# Patient Record
Sex: MALE | Race: WHITE | NOT HISPANIC OR LATINO | Employment: FULL TIME | URBAN - METROPOLITAN AREA
[De-identification: names, ages, dates, MRNs, and addresses within clinical notes are randomized per-mention and may not be internally consistent; named-entity substitution may affect disease eponyms.]

---

## 2019-01-11 ENCOUNTER — HOSPITAL ENCOUNTER (OUTPATIENT)
Dept: OTHER | Facility: HOSPITAL | Age: 34
Discharge: HOME | End: 2019-01-11
Attending: PSYCHIATRY & NEUROLOGY

## 2019-01-11 LAB
25(OH)D3 SERPL-MCNC: 28.3 NG/ML (ref 30–100)
ALBUMIN SERPL-MCNC: 4.3 G/DL (ref 3.5–5)
ALBUMIN/GLOB SERPL: 1.4 {RATIO} (ref 1.4–2.6)
ALP SERPL-CCNC: 94 U/L (ref 53–128)
ALT SERPL-CCNC: 112 U/L (ref 10–40)
ANION GAP SERPL CALC-SCNC: 21 MMOL/L (ref 8–19)
AST SERPL-CCNC: 62 U/L (ref 15–50)
BASOPHILS # BLD AUTO: 0.05 10*3/UL (ref 0–0.2)
BASOPHILS NFR BLD AUTO: 0.77 % (ref 0–3)
BILIRUB SERPL-MCNC: 0.55 MG/DL (ref 0.2–1.3)
BUN SERPL-MCNC: 16 MG/DL (ref 5–25)
BUN/CREAT SERPL: 11 {RATIO} (ref 6–20)
CALCIUM SERPL-MCNC: 9.9 MG/DL (ref 8.7–10.4)
CEA SERPL-MCNC: 1.2 NG/ML (ref 0–5)
CHLORIDE SERPL-SCNC: 104 MMOL/L (ref 99–111)
CONV CO2: 22 MMOL/L (ref 22–32)
CONV TOTAL PROTEIN: 7.3 G/DL (ref 6.3–8.2)
CREAT UR-MCNC: 1.49 MG/DL (ref 0.7–1.2)
EOSINOPHIL # BLD AUTO: 0.12 10*3/UL (ref 0–0.7)
EOSINOPHIL # BLD AUTO: 2.03 % (ref 0–7)
ERYTHROCYTE [DISTWIDTH] IN BLOOD BY AUTOMATED COUNT: 12.3 % (ref 11.5–14.5)
EST. AVERAGE GLUCOSE BLD GHB EST-MCNC: 100 MG/DL
GFR SERPLBLD BASED ON 1.73 SQ M-ARVRAT: 60 ML/MIN/{1.73_M2}
GLOBULIN UR ELPH-MCNC: 3 G/DL (ref 2–3.5)
GLUCOSE 2H P MEAL SERPL-MCNC: 130 MG/DL (ref 70–140)
GLUCOSE SERPL-MCNC: 140 MG/DL (ref 70–99)
HBA1C MFR BLD: 16.6 G/DL (ref 14–18)
HBA1C MFR BLD: 5.1 % (ref 3.5–5.7)
HCT VFR BLD AUTO: 44.9 % (ref 42–52)
LYMPHOCYTES # BLD AUTO: 1.32 10*3/UL (ref 1–5)
MCH RBC QN AUTO: 32.4 PG (ref 27–31)
MCHC RBC AUTO-ENTMCNC: 37 G/DL (ref 33–37)
MCV RBC AUTO: 87.5 FL (ref 80–96)
MONOCYTES # BLD AUTO: 0.54 10*3/UL (ref 0.2–1.2)
MONOCYTES NFR BLD AUTO: 8.91 % (ref 3–10)
NEUTROPHILS # BLD AUTO: 4 10*3/UL (ref 2–8)
NEUTROPHILS NFR BLD AUTO: 66.4 % (ref 30–85)
NRBC BLD AUTO-RTO: 0 % (ref 0–0.01)
OSMOLALITY SERPL CALC.SUM OF ELEC: 297 MOSM/KG (ref 273–304)
PLATELET # BLD AUTO: 168 10*3/UL (ref 130–400)
PMV BLD AUTO: 7.2 FL (ref 7.4–10.4)
POTASSIUM SERPL-SCNC: 4.8 MMOL/L (ref 3.5–5.3)
RBC # BLD AUTO: 5.12 10*6/UL (ref 4.7–6.1)
SODIUM SERPL-SCNC: 142 MMOL/L (ref 135–147)
T4 FREE SERPL-MCNC: 1.1 NG/DL (ref 0.9–1.8)
TSH SERPL-ACNC: 2.3 M[IU]/L (ref 0.27–4.2)
VARIANT LYMPHS NFR BLD MANUAL: 21.9 % (ref 20–45)
WBC # BLD AUTO: 6.02 10*3/UL (ref 4.8–10.8)

## 2019-01-14 LAB
B BURGDOR IGG+IGM SER-ACNC: NORMAL
CONV ANTI NUCLEAR ANTIBODY WITH REFLEX: NEGATIVE

## 2019-01-15 LAB
ARSENIC BLD-MCNC: 7 UG/L (ref 2–23)
LEAD BLD-MCNC: NORMAL UG/DL (ref 0–4)
MERCURY BLD-MCNC: NORMAL UG/L (ref 0–14.9)

## 2019-01-25 ENCOUNTER — HOSPITAL ENCOUNTER (OUTPATIENT)
Dept: GENERAL RADIOLOGY | Facility: HOSPITAL | Age: 34
Discharge: HOME | End: 2019-01-25
Attending: FAMILY MEDICINE

## 2019-04-11 ENCOUNTER — HOSPITAL ENCOUNTER (OUTPATIENT)
Dept: INFUSION THERAPY | Facility: HOSPITAL | Age: 34
Setting detail: RECURRING SERIES
Discharge: HOME | End: 2019-04-30
Attending: PSYCHIATRY & NEUROLOGY

## 2019-05-01 ENCOUNTER — HOSPITAL ENCOUNTER (OUTPATIENT)
Dept: INFUSION THERAPY | Facility: HOSPITAL | Age: 34
Setting detail: RECURRING SERIES
Discharge: HOME | End: 2019-05-31
Attending: PSYCHIATRY & NEUROLOGY

## 2019-06-01 ENCOUNTER — HOSPITAL ENCOUNTER (OUTPATIENT)
Dept: INFUSION THERAPY | Facility: HOSPITAL | Age: 34
Setting detail: RECURRING SERIES
Discharge: HOME | End: 2019-06-30
Attending: PSYCHIATRY & NEUROLOGY

## 2019-07-01 ENCOUNTER — HOSPITAL ENCOUNTER (OUTPATIENT)
Dept: INFUSION THERAPY | Facility: HOSPITAL | Age: 34
Setting detail: RECURRING SERIES
Discharge: HOME | End: 2019-07-31
Attending: PSYCHIATRY & NEUROLOGY

## 2020-02-01 ENCOUNTER — HOSPITAL ENCOUNTER (OUTPATIENT)
Dept: INFUSION THERAPY | Facility: HOSPITAL | Age: 35
Setting detail: RECURRING SERIES
Discharge: HOME | End: 2020-02-18
Attending: PSYCHIATRY & NEUROLOGY

## 2020-02-17 LAB
ALBUMIN SERPL-MCNC: 4.3 G/DL (ref 3.5–5)
ALBUMIN/GLOB SERPL: 1.7 {RATIO} (ref 1.4–2.6)
ALP SERPL-CCNC: 70 U/L (ref 53–128)
ALT SERPL-CCNC: 124 U/L (ref 10–40)
ANION GAP SERPL CALC-SCNC: 16 MMOL/L (ref 8–19)
AST SERPL-CCNC: 59 U/L (ref 15–50)
BASOPHILS # BLD AUTO: 0.06 10*3/UL (ref 0–0.2)
BASOPHILS NFR BLD AUTO: 1.4 % (ref 0–3)
BILIRUB SERPL-MCNC: 0.76 MG/DL (ref 0.2–1.3)
BUN SERPL-MCNC: 10 MG/DL (ref 5–25)
BUN/CREAT SERPL: 10 {RATIO} (ref 6–20)
CALCIUM SERPL-MCNC: 9.5 MG/DL (ref 8.7–10.4)
CHLORIDE SERPL-SCNC: 101 MMOL/L (ref 99–111)
CONV ABS IMM GRAN: 0.01 10*3/UL (ref 0–0.2)
CONV CO2: 26 MMOL/L (ref 22–32)
CONV IMMATURE GRAN: 0.2 % (ref 0–1.8)
CONV TOTAL PROTEIN: 6.9 G/DL (ref 6.3–8.2)
CREAT UR-MCNC: 1.01 MG/DL (ref 0.7–1.2)
DEPRECATED RDW RBC AUTO: 43.4 FL (ref 35.1–43.9)
EOSINOPHIL # BLD AUTO: 0.12 10*3/UL (ref 0–0.7)
EOSINOPHIL # BLD AUTO: 2.8 % (ref 0–7)
ERYTHROCYTE [DISTWIDTH] IN BLOOD BY AUTOMATED COUNT: 13.2 % (ref 11.6–14.4)
GFR SERPLBLD BASED ON 1.73 SQ M-ARVRAT: >60 ML/MIN/{1.73_M2}
GLOBULIN UR ELPH-MCNC: 2.6 G/DL (ref 2–3.5)
GLUCOSE SERPL-MCNC: 96 MG/DL (ref 70–99)
HCT VFR BLD AUTO: 47.3 % (ref 42–52)
HGB BLD-MCNC: 16.3 G/DL (ref 14–18)
LYMPHOCYTES # BLD AUTO: 0.91 10*3/UL (ref 1–5)
LYMPHOCYTES NFR BLD AUTO: 20.9 % (ref 20–45)
MCH RBC QN AUTO: 31.3 PG (ref 27–31)
MCHC RBC AUTO-ENTMCNC: 34.5 G/DL (ref 33–37)
MCV RBC AUTO: 91 FL (ref 80–96)
MONOCYTES # BLD AUTO: 0.46 10*3/UL (ref 0.2–1.2)
MONOCYTES NFR BLD AUTO: 10.6 % (ref 3–10)
NEUTROPHILS # BLD AUTO: 2.8 10*3/UL (ref 2–8)
NEUTROPHILS NFR BLD AUTO: 64.1 % (ref 30–85)
NRBC CBCN: 0 % (ref 0–0.7)
OSMOLALITY SERPL CALC.SUM OF ELEC: 287 MOSM/KG (ref 273–304)
PLATELET # BLD AUTO: 152 10*3/UL (ref 130–400)
PMV BLD AUTO: 10 FL (ref 9.4–12.4)
POTASSIUM SERPL-SCNC: 4.4 MMOL/L (ref 3.5–5.3)
RBC # BLD AUTO: 5.2 10*6/UL (ref 4.7–6.1)
SODIUM SERPL-SCNC: 139 MMOL/L (ref 135–147)
WBC # BLD AUTO: 4.36 10*3/UL (ref 4.8–10.8)

## 2020-02-18 LAB — B BURGDOR IGG+IGM SER-ACNC: <0.91 ISR (ref 0–0.9)

## 2020-02-19 LAB
R RICKETTSI IGG SER QL IA: NEGATIVE
R RICKETTSI IGM TITR SER: 0.58 INDEX (ref 0–0.89)

## 2021-08-24 ENCOUNTER — HOSPITAL ENCOUNTER (EMERGENCY)
Facility: HOSPITAL | Age: 36
Discharge: HOME | End: 2021-08-25
Attending: EMERGENCY MEDICINE | Admitting: EMERGENCY MEDICINE

## 2021-08-24 VITALS
OXYGEN SATURATION: 92 % | HEART RATE: 104 BPM | RESPIRATION RATE: 18 BRPM | HEIGHT: 68 IN | WEIGHT: 266.54 LBS | TEMPERATURE: 98.4 F | DIASTOLIC BLOOD PRESSURE: 77 MMHG | SYSTOLIC BLOOD PRESSURE: 117 MMHG | BODY MASS INDEX: 40.4 KG/M2

## 2021-08-24 DIAGNOSIS — T30.0 HOT LIQUID BURN: Primary | ICD-10-CM
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
## 2021-08-24 PROCEDURE — 99283 EMERGENCY DEPT VISIT LOW MDM: CPT

## 2021-08-24 RX ORDER — LIDOCAINE AND PRILOCAINE 25; 25 MG/G; MG/G
CREAM TOPICAL ONCE
Status: COMPLETED | OUTPATIENT
Start: 2021-08-25 | End: 2021-08-25

## 2021-08-24 RX ORDER — LIDOCAINE AND PRILOCAINE 25; 25 MG/G; MG/G
CREAM TOPICAL ONCE
Qty: 1 KIT | Refills: 0 | Status: SHIPPED | OUTPATIENT
Start: 2021-08-25 | End: 2021-08-25

## 2021-08-25 RX ADMIN — LIDOCAINE AND PRILOCAINE 1 APPLICATION: 25; 25 CREAM TOPICAL at 00:00
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
## 2021-08-25 NOTE — DISCHARGE INSTRUCTIONS
As discussed, please call your eye doctor tomorrow to schedule an appointment for an exam as soon as possible to verify that there is no damage to your left eye.

## 2022-03-05 ENCOUNTER — HOSPITAL ENCOUNTER (EMERGENCY)
Facility: HOSPITAL | Age: 37
Discharge: LEFT BEFORE DISCHARGE | End: 2022-03-05

## 2022-03-05 PROCEDURE — 99211 OFF/OP EST MAY X REQ PHY/QHP: CPT

## 2022-06-29 ENCOUNTER — HOSPITAL ENCOUNTER (EMERGENCY)
Facility: HOSPITAL | Age: 37
Discharge: HOME | End: 2022-06-29
Attending: EMERGENCY MEDICINE | Admitting: EMERGENCY MEDICINE

## 2022-06-29 VITALS
BODY MASS INDEX: 40.93 KG/M2 | SYSTOLIC BLOOD PRESSURE: 134 MMHG | HEIGHT: 68 IN | RESPIRATION RATE: 18 BRPM | TEMPERATURE: 98.1 F | OXYGEN SATURATION: 100 % | WEIGHT: 270.06 LBS | DIASTOLIC BLOOD PRESSURE: 88 MMHG | HEART RATE: 92 BPM

## 2022-06-29 DIAGNOSIS — K43.9 VENTRAL HERNIA WITHOUT OBSTRUCTION OR GANGRENE: Primary | ICD-10-CM

## 2022-06-29 LAB
ALBUMIN SERPL-MCNC: 4.3 G/DL (ref 3.5–5.2)
ALBUMIN/GLOB SERPL: 1.6 G/DL
ALP SERPL-CCNC: 75 U/L (ref 39–117)
ALT SERPL W P-5'-P-CCNC: 122 U/L (ref 1–41)
ANION GAP SERPL CALCULATED.3IONS-SCNC: 7.6 MMOL/L (ref 5–15)
AST SERPL-CCNC: 84 U/L (ref 1–40)
BASOPHILS # BLD AUTO: 0.07 10*3/MM3 (ref 0–0.2)
BASOPHILS NFR BLD AUTO: 1.3 % (ref 0–1.5)
BILIRUB SERPL-MCNC: 1.2 MG/DL (ref 0–1.2)
BUN SERPL-MCNC: 7 MG/DL (ref 6–20)
BUN/CREAT SERPL: 6.9 (ref 7–25)
CALCIUM SPEC-SCNC: 9.8 MG/DL (ref 8.6–10.5)
CHLORIDE SERPL-SCNC: 103 MMOL/L (ref 98–107)
CO2 SERPL-SCNC: 27.4 MMOL/L (ref 22–29)
CREAT SERPL-MCNC: 1.01 MG/DL (ref 0.76–1.27)
DEPRECATED RDW RBC AUTO: 44.4 FL (ref 37–54)
EGFRCR SERPLBLD CKD-EPI 2021: 97.6 ML/MIN/1.73
EOSINOPHIL # BLD AUTO: 0.2 10*3/MM3 (ref 0–0.4)
EOSINOPHIL NFR BLD AUTO: 3.8 % (ref 0.3–6.2)
ERYTHROCYTE [DISTWIDTH] IN BLOOD BY AUTOMATED COUNT: 13.2 % (ref 12.3–15.4)
GLOBULIN UR ELPH-MCNC: 2.7 GM/DL
GLUCOSE SERPL-MCNC: 125 MG/DL (ref 65–99)
HCT VFR BLD AUTO: 50.9 % (ref 37.5–51)
HGB BLD-MCNC: 17.8 G/DL (ref 13–17.7)
HOLD SPECIMEN: NORMAL
HOLD SPECIMEN: NORMAL
IMM GRANULOCYTES # BLD AUTO: 0.02 10*3/MM3 (ref 0–0.05)
IMM GRANULOCYTES NFR BLD AUTO: 0.4 % (ref 0–0.5)
LIPASE SERPL-CCNC: 54 U/L (ref 13–60)
LYMPHOCYTES # BLD AUTO: 1.2 10*3/MM3 (ref 0.7–3.1)
LYMPHOCYTES NFR BLD AUTO: 22.6 % (ref 19.6–45.3)
MCH RBC QN AUTO: 32.1 PG (ref 26.6–33)
MCHC RBC AUTO-ENTMCNC: 35 G/DL (ref 31.5–35.7)
MCV RBC AUTO: 91.7 FL (ref 79–97)
MONOCYTES # BLD AUTO: 0.47 10*3/MM3 (ref 0.1–0.9)
MONOCYTES NFR BLD AUTO: 8.9 % (ref 5–12)
NEUTROPHILS NFR BLD AUTO: 3.35 10*3/MM3 (ref 1.7–7)
NEUTROPHILS NFR BLD AUTO: 63 % (ref 42.7–76)
NRBC BLD AUTO-RTO: 0 /100 WBC (ref 0–0.2)
PLATELET # BLD AUTO: 146 10*3/MM3 (ref 140–450)
PMV BLD AUTO: 9.2 FL (ref 6–12)
POTASSIUM SERPL-SCNC: 4.5 MMOL/L (ref 3.5–5.2)
PROT SERPL-MCNC: 7 G/DL (ref 6–8.5)
RBC # BLD AUTO: 5.55 10*6/MM3 (ref 4.14–5.8)
SODIUM SERPL-SCNC: 138 MMOL/L (ref 136–145)
WBC NRBC COR # BLD: 5.31 10*3/MM3 (ref 3.4–10.8)
WHOLE BLOOD HOLD COAG: NORMAL
WHOLE BLOOD HOLD SPECIMEN: NORMAL

## 2022-06-29 PROCEDURE — 99282 EMERGENCY DEPT VISIT SF MDM: CPT

## 2022-06-29 PROCEDURE — 83690 ASSAY OF LIPASE: CPT | Performed by: EMERGENCY MEDICINE

## 2022-06-29 PROCEDURE — 36415 COLL VENOUS BLD VENIPUNCTURE: CPT

## 2022-06-29 PROCEDURE — 85025 COMPLETE CBC W/AUTO DIFF WBC: CPT | Performed by: EMERGENCY MEDICINE

## 2022-06-29 PROCEDURE — 80053 COMPREHEN METABOLIC PANEL: CPT | Performed by: EMERGENCY MEDICINE

## 2022-06-29 RX ORDER — SODIUM CHLORIDE 0.9 % (FLUSH) 0.9 %
10 SYRINGE (ML) INJECTION AS NEEDED
Status: DISCONTINUED | OUTPATIENT
Start: 2022-06-29 | End: 2022-06-29 | Stop reason: HOSPADM

## 2022-07-07 ENCOUNTER — TELEPHONE (OUTPATIENT)
Dept: SURGERY | Facility: CLINIC | Age: 37
End: 2022-07-07

## 2022-07-11 NOTE — TELEPHONE ENCOUNTER
3RD ATTEMPT, CALLED PT NO ANSWER LMOM, PT REFERRED FROM [facility] ER, ANYTHING ELSE TO DO?

## 2022-08-17 ENCOUNTER — HOSPITAL ENCOUNTER (EMERGENCY)
Facility: HOSPITAL | Age: 37
Discharge: LEFT BEFORE DISCHARGE | End: 2022-08-17

## 2022-08-17 VITALS
HEIGHT: 68 IN | HEART RATE: 98 BPM | TEMPERATURE: 97.8 F | DIASTOLIC BLOOD PRESSURE: 85 MMHG | RESPIRATION RATE: 18 BRPM | BODY MASS INDEX: 40.46 KG/M2 | SYSTOLIC BLOOD PRESSURE: 135 MMHG | OXYGEN SATURATION: 99 % | WEIGHT: 266.98 LBS

## 2022-08-17 PROCEDURE — 99211 OFF/OP EST MAY X REQ PHY/QHP: CPT

## 2022-08-20 ENCOUNTER — HOSPITAL ENCOUNTER (EMERGENCY)
Facility: HOSPITAL | Age: 37
Discharge: LEFT BEFORE DISCHARGE | End: 2022-08-20

## 2022-08-20 PROCEDURE — 99211 OFF/OP EST MAY X REQ PHY/QHP: CPT

## 2023-01-05 ENCOUNTER — HOSPITAL ENCOUNTER (EMERGENCY)
Facility: HOSPITAL | Age: 38
Discharge: HOME | End: 2023-01-05
Attending: EMERGENCY MEDICINE | Admitting: EMERGENCY MEDICINE
Payer: COMMERCIAL

## 2023-01-05 ENCOUNTER — APPOINTMENT (OUTPATIENT)
Dept: GENERAL RADIOLOGY | Facility: HOSPITAL | Age: 38
End: 2023-01-05
Payer: COMMERCIAL

## 2023-01-05 ENCOUNTER — TELEPHONE (OUTPATIENT)
Dept: FAMILY MEDICINE CLINIC | Facility: CLINIC | Age: 38
End: 2023-01-05

## 2023-01-05 VITALS
DIASTOLIC BLOOD PRESSURE: 87 MMHG | SYSTOLIC BLOOD PRESSURE: 130 MMHG | RESPIRATION RATE: 18 BRPM | HEART RATE: 92 BPM | TEMPERATURE: 98.5 F | OXYGEN SATURATION: 99 % | BODY MASS INDEX: 41.1 KG/M2 | WEIGHT: 271.17 LBS | HEIGHT: 68 IN

## 2023-01-05 DIAGNOSIS — R55 SYNCOPE, UNSPECIFIED SYNCOPE TYPE: Primary | ICD-10-CM

## 2023-01-05 LAB
ALBUMIN SERPL-MCNC: 4.2 G/DL (ref 3.5–5.2)
ALBUMIN/GLOB SERPL: 1.4 G/DL
ALP SERPL-CCNC: 74 U/L (ref 39–117)
ALT SERPL W P-5'-P-CCNC: 83 U/L (ref 1–41)
ANION GAP SERPL CALCULATED.3IONS-SCNC: 11.9 MMOL/L (ref 5–15)
AST SERPL-CCNC: 69 U/L (ref 1–40)
BASOPHILS # BLD AUTO: 0.1 10*3/MM3 (ref 0–0.2)
BASOPHILS NFR BLD AUTO: 1.4 % (ref 0–1.5)
BILIRUB SERPL-MCNC: 0.7 MG/DL (ref 0–1.2)
BUN SERPL-MCNC: 6 MG/DL (ref 6–20)
BUN/CREAT SERPL: 6.7 (ref 7–25)
CALCIUM SPEC-SCNC: 9.3 MG/DL (ref 8.6–10.5)
CHLORIDE SERPL-SCNC: 102 MMOL/L (ref 98–107)
CO2 SERPL-SCNC: 27.1 MMOL/L (ref 22–29)
CREAT SERPL-MCNC: 0.9 MG/DL (ref 0.76–1.27)
DEPRECATED RDW RBC AUTO: 42.9 FL (ref 37–54)
EGFRCR SERPLBLD CKD-EPI 2021: 111.4 ML/MIN/1.73
EOSINOPHIL # BLD AUTO: 0.3 10*3/MM3 (ref 0–0.4)
EOSINOPHIL NFR BLD AUTO: 4.1 % (ref 0.3–6.2)
ERYTHROCYTE [DISTWIDTH] IN BLOOD BY AUTOMATED COUNT: 13.2 % (ref 12.3–15.4)
ETHANOL BLD-MCNC: 194 MG/DL (ref 0–10)
ETHANOL UR QL: 0.19 %
GLOBULIN UR ELPH-MCNC: 3.1 GM/DL
GLUCOSE SERPL-MCNC: 87 MG/DL (ref 65–99)
HCT VFR BLD AUTO: 47.5 % (ref 37.5–51)
HGB BLD-MCNC: 17 G/DL (ref 13–17.7)
HOLD SPECIMEN: NORMAL
HOLD SPECIMEN: NORMAL
IMM GRANULOCYTES # BLD AUTO: 0.03 10*3/MM3 (ref 0–0.05)
IMM GRANULOCYTES NFR BLD AUTO: 0.4 % (ref 0–0.5)
LIPASE SERPL-CCNC: 53 U/L (ref 13–60)
LYMPHOCYTES # BLD AUTO: 2.12 10*3/MM3 (ref 0.7–3.1)
LYMPHOCYTES NFR BLD AUTO: 28.6 % (ref 19.6–45.3)
MAGNESIUM SERPL-MCNC: 2.2 MG/DL (ref 1.6–2.6)
MCH RBC QN AUTO: 32.1 PG (ref 26.6–33)
MCHC RBC AUTO-ENTMCNC: 35.8 G/DL (ref 31.5–35.7)
MCV RBC AUTO: 89.8 FL (ref 79–97)
MONOCYTES # BLD AUTO: 0.7 10*3/MM3 (ref 0.1–0.9)
MONOCYTES NFR BLD AUTO: 9.5 % (ref 5–12)
NEUTROPHILS NFR BLD AUTO: 4.15 10*3/MM3 (ref 1.7–7)
NEUTROPHILS NFR BLD AUTO: 56 % (ref 42.7–76)
NRBC BLD AUTO-RTO: 0 /100 WBC (ref 0–0.2)
NT-PROBNP SERPL-MCNC: <36 PG/ML (ref 0–450)
PLATELET # BLD AUTO: 142 10*3/MM3 (ref 140–450)
PMV BLD AUTO: 9.1 FL (ref 6–12)
POTASSIUM SERPL-SCNC: 4 MMOL/L (ref 3.5–5.2)
PROT SERPL-MCNC: 7.3 G/DL (ref 6–8.5)
RBC # BLD AUTO: 5.29 10*6/MM3 (ref 4.14–5.8)
SODIUM SERPL-SCNC: 141 MMOL/L (ref 136–145)
TROPONIN I SERPL-MCNC: 0 NG/ML (ref 0–0.08)
TROPONIN I SERPL-MCNC: 0.01 NG/ML (ref 0–0.08)
WBC NRBC COR # BLD: 7.4 10*3/MM3 (ref 3.4–10.8)
WHOLE BLOOD HOLD COAG: NORMAL
WHOLE BLOOD HOLD SPECIMEN: NORMAL

## 2023-01-05 PROCEDURE — 36415 COLL VENOUS BLD VENIPUNCTURE: CPT

## 2023-01-05 PROCEDURE — 83735 ASSAY OF MAGNESIUM: CPT

## 2023-01-05 PROCEDURE — 93005 ELECTROCARDIOGRAM TRACING: CPT

## 2023-01-05 PROCEDURE — 84484 ASSAY OF TROPONIN QUANT: CPT

## 2023-01-05 PROCEDURE — 93005 ELECTROCARDIOGRAM TRACING: CPT | Performed by: EMERGENCY MEDICINE

## 2023-01-05 PROCEDURE — 80053 COMPREHEN METABOLIC PANEL: CPT

## 2023-01-05 PROCEDURE — 82077 ASSAY SPEC XCP UR&BREATH IA: CPT | Performed by: EMERGENCY MEDICINE

## 2023-01-05 PROCEDURE — 83880 ASSAY OF NATRIURETIC PEPTIDE: CPT

## 2023-01-05 PROCEDURE — 93010 ELECTROCARDIOGRAM REPORT: CPT | Performed by: INTERNAL MEDICINE

## 2023-01-05 PROCEDURE — 71045 X-RAY EXAM CHEST 1 VIEW: CPT

## 2023-01-05 PROCEDURE — 99284 EMERGENCY DEPT VISIT MOD MDM: CPT

## 2023-01-05 PROCEDURE — 83690 ASSAY OF LIPASE: CPT

## 2023-01-05 PROCEDURE — 85025 COMPLETE CBC W/AUTO DIFF WBC: CPT

## 2023-01-05 RX ORDER — ASPIRIN 81 MG/1
324 TABLET, CHEWABLE ORAL ONCE
Status: DISCONTINUED | OUTPATIENT
Start: 2023-01-05 | End: 2023-01-06 | Stop reason: HOSPADM

## 2023-01-05 RX ORDER — SODIUM CHLORIDE 0.9 % (FLUSH) 0.9 %
10 SYRINGE (ML) INJECTION AS NEEDED
Status: DISCONTINUED | OUTPATIENT
Start: 2023-01-05 | End: 2023-01-06 | Stop reason: HOSPADM

## 2023-01-05 NOTE — Clinical Note
[facility] EMERGENCY ROOM  [address]  [place] [address]  Phone: [phone]    Kurt Balbuena was seen and treated in our emergency department on 1/5/2023.  He may return to work on 01/07/2023.         Thank you for choosing [facility].    Stephen Austin MD

## 2023-01-05 NOTE — TELEPHONE ENCOUNTER
Tried reaching out to patient with both numbers listed. Unable to leave message with either number.     Patient is scheduled for 1/5/2023 to establish care with Tuyet Valadez. Provider is unexpectedly going to be out of office. We would like to get patient rescheduled.

## 2023-01-06 LAB
QT INTERVAL: 335 MS
QT INTERVAL: 342 MS

## 2023-01-06 NOTE — ED PROVIDER NOTES
Time: 8:57 PM EST  Date of encounter:  1/5/2023  Independent Historian/Clinical History and Information was obtained by:   Patient  Chief Complaint: HTN, syncope, headache    History is limited by: N/A    History of Present Illness:  Patient is a 37 y.o. year old male who presents to the emergency department for evaluation of HTN, syncope, and headache. Pt notes SOB started 3 days ago. Pt denies fever. Pt notes chronic cough due to work conditions. Wife notes pt said his head was hurting last night. Wife notes she found the pt floor and unresponsive. Pt notes \"small\" headache currently. Pt describes the headache as dull and not severe. Pt notes he was standing up and got dizzy before syncopal episode. Pt is noted saying he \"saw stars\" before LOC. Pt denies hx of syncope. Pt admits to being heavy drinker. Pt notes both legs feel weak. Pt notes lightheadedness. Pt notes he felt better when he was put on O2. Pt is better now.               History provided by:  Patient and spouse   used: No        Patient Care Team  Primary Care Provider: Susan Singh APRN    Past Medical History:     Allergies   Allergen Reactions   • Benadryl [Diphenhydramine] Hallucinations     Past Medical History:   Diagnosis Date   • Hernia of abdominal wall    • MI (myocardial infarction) (HCC)      Past Surgical History:   Procedure Laterality Date   • APPENDECTOMY       No family history on file.    Home Medications:  Prior to Admission medications    Not on File        Social History:   Social History     Tobacco Use   • Smoking status: Never   • Smokeless tobacco: Current     Types: Snuff   Vaping Use   • Vaping Use: Never used   Substance Use Topics   • Alcohol use: Yes     Alcohol/week: 14.0 standard drinks     Types: 14 Cans of beer per week   • Drug use: Never         Review of Systems:  Review of Systems   Constitutional: Negative for chills and fever.   HENT: Negative for congestion, rhinorrhea and sore  throat.    Eyes: Negative for photophobia.   Respiratory: Negative for apnea, cough, chest tightness and shortness of breath.    Cardiovascular: Negative for chest pain and palpitations.   Gastrointestinal: Negative for abdominal pain, diarrhea, nausea and vomiting.   Endocrine: Negative.    Genitourinary: Negative for difficulty urinating and dysuria.   Musculoskeletal: Negative for back pain, joint swelling and myalgias.   Skin: Negative for color change and wound.   Allergic/Immunologic: Negative.    Neurological: Positive for syncope, weakness, light-headedness and headaches. Negative for seizures.   Psychiatric/Behavioral: Negative.    All other systems reviewed and are negative.       Physical Exam:  /97 (Patient Position: Standing)   Pulse 114   Temp 98.3 °F (36.8 °C) (Oral)   Resp 12   Ht 172.7 cm (68\")   Wt 123 kg (271 lb 2.7 oz)   SpO2 99%   BMI 41.23 kg/m²     Physical Exam  Vitals and nursing note reviewed.   Constitutional:       General: He is awake.      Appearance: Normal appearance.   HENT:      Head: Normocephalic and atraumatic.      Nose: Nose normal.      Mouth/Throat:      Mouth: Mucous membranes are moist.   Eyes:      Extraocular Movements: Extraocular movements intact.      Pupils: Pupils are equal, round, and reactive to light.   Cardiovascular:      Rate and Rhythm: Normal rate and regular rhythm.      Heart sounds: Normal heart sounds.   Pulmonary:      Effort: Pulmonary effort is normal. No respiratory distress.      Breath sounds: Normal breath sounds. No wheezing, rhonchi or rales.   Abdominal:      General: Bowel sounds are normal.      Palpations: Abdomen is soft.      Tenderness: There is no abdominal tenderness. There is no guarding or rebound.      Comments: No rigidity   Musculoskeletal:         General: No tenderness. Normal range of motion.      Cervical back: Normal range of motion and neck supple.   Skin:     General: Skin is warm and dry.      Coloration: Skin  is not jaundiced.   Neurological:      General: No focal deficit present.      Mental Status: He is alert and oriented to person, place, and time. Mental status is at baseline.      Sensory: Sensation is intact.      Motor: Motor function is intact.      Coordination: Coordination is intact.   Psychiatric:         Attention and Perception: Attention and perception normal.         Mood and Affect: Mood and affect normal.         Speech: Speech normal.         Behavior: Behavior normal.         Judgment: Judgment normal.                  Procedures:  Procedures      Medical Decision Making:      Comorbidities that affect care:    Hypertension    External Notes reviewed:    None      The following orders were placed and all results were independently analyzed by me:  Orders Placed This Encounter   Procedures   • XR Chest 1 View   • Bern Draw   • Comprehensive Metabolic Panel   • Lipase   • BNP   • Magnesium   • CBC Auto Differential   • Ethanol   • NPO Diet NPO Type: Strict NPO   • Undress & Gown   • Cardiac Monitoring   • Continuous Pulse Oximetry   • Oxygen Therapy- Nasal Cannula; 2 LPM; Titrate for SPO2: equal to or greater than, 92%   • POC Troponin I   • POC Troponin I   • POC Troponin I   • POC Troponin I   • ECG 12 Lead ED Triage Standing Order; Chest Pain   • ECG 12 Lead ED Triage Standing Order; Chest Pain   • Insert Peripheral IV   • POC Troponin I with Hold Tube   • CBC & Differential   • Green Top (Gel)   • Lavender Top   • Gold Top - SST   • Light Blue Top   • HOLD Troponin-I Tube   • HOLD Troponin-I Tube       Medications Given in the Emergency Department:  Medications   sodium chloride 0.9 % flush 10 mL (has no administration in time range)   aspirin chewable tablet 324 mg (324 mg Oral Not Given 1/5/23 2050)        ED Course:    ED Course as of 01/05/23 2114   Thu Jan 05, 2023 2058 EKG interpretation: Normal sinus rhythm, rate 95, normal WI and QT intervals, normal QRS duration, normal axis, normal  ST segments with no acute ischemia. [RP]   2112 Patient was requesting AMA versus discharge prior to my exam so I did expedite his care.  He is feeling better in the ER and does not request any further ED evaluation.  He describes his headache is only mild/dull, not severe.  No neurologic symptoms/deficits. [RP]      ED Course User Index  [RP] Stephen Austin MD       Labs:    Lab Results (last 24 hours)     Procedure Component Value Units Date/Time    CBC & Differential [[id number]]  (Abnormal) Collected: 01/05/23 1834    Specimen: Blood Updated: 01/05/23 1843    Narrative:      The following orders were created for panel order CBC & Differential.  Procedure                               Abnormality         Status                     ---------                               -----------         ------                     CBC Auto Differential[[id number]]        Abnormal            Final result                 Please view results for these tests on the individual orders.    Comprehensive Metabolic Panel [[id number]]  (Abnormal) Collected: 01/05/23 1834    Specimen: Blood Updated: 01/05/23 1911     Glucose 87 mg/dL      BUN 6 mg/dL      Creatinine 0.90 mg/dL      Sodium 141 mmol/L      Potassium 4.0 mmol/L      Comment: Slight hemolysis detected by analyzer. Results may be affected.        Chloride 102 mmol/L      CO2 27.1 mmol/L      Calcium 9.3 mg/dL      Total Protein 7.3 g/dL      Albumin 4.2 g/dL      ALT (SGPT) 83 U/L      AST (SGOT) 69 U/L      Alkaline Phosphatase 74 U/L      Total Bilirubin 0.7 mg/dL      Globulin 3.1 gm/dL      A/G Ratio 1.4 g/dL      BUN/Creatinine Ratio 6.7     Anion Gap 11.9 mmol/L      eGFR 111.4 mL/min/1.73      Comment: National Kidney Foundation and American Society of Nephrology (ASN) Task Force recommended calculation based on the Chronic Kidney Disease Epidemiology Collaboration (CKD-EPI) equation refit without adjustment for race.       Narrative:      GFR Normal >60  Chronic  Kidney Disease <60  Kidney Failure <15      Lipase [[id number]]  (Normal) Collected: 01/05/23 1834    Specimen: Blood Updated: 01/05/23 1911     Lipase 53 U/L     BNP [[id number]]  (Normal) Collected: 01/05/23 1834    Specimen: Blood Updated: 01/05/23 1900     proBNP <36.0 pg/mL     Narrative:      Among patients with dyspnea, NT-proBNP is highly sensitive for the detection of acute congestive heart failure. In addition NT-proBNP of <300 pg/ml effectively rules out acute congestive heart failure with 99% negative predictive value.      Magnesium [[id number]]  (Normal) Collected: 01/05/23 1834    Specimen: Blood Updated: 01/05/23 1911     Magnesium 2.2 mg/dL     CBC Auto Differential [[id number]]  (Abnormal) Collected: 01/05/23 1834    Specimen: Blood Updated: 01/05/23 1843     WBC 7.40 10*3/mm3      RBC 5.29 10*6/mm3      Hemoglobin 17.0 g/dL      Hematocrit 47.5 %      MCV 89.8 fL      MCH 32.1 pg      MCHC 35.8 g/dL      RDW 13.2 %      RDW-SD 42.9 fl      MPV 9.1 fL      Platelets 142 10*3/mm3      Neutrophil % 56.0 %      Lymphocyte % 28.6 %      Monocyte % 9.5 %      Eosinophil % 4.1 %      Basophil % 1.4 %      Immature Grans % 0.4 %      Neutrophils, Absolute 4.15 10*3/mm3      Lymphocytes, Absolute 2.12 10*3/mm3      Monocytes, Absolute 0.70 10*3/mm3      Eosinophils, Absolute 0.30 10*3/mm3      Basophils, Absolute 0.10 10*3/mm3      Immature Grans, Absolute 0.03 10*3/mm3      nRBC 0.0 /100 WBC     POC Troponin I [[id number]]  (Normal) Collected: 01/05/23 1837    Specimen: Blood Updated: 01/05/23 1850     Troponin I 0.00 ng/mL      Comment: Serial Number: [id number]:  [id number]       POC Troponin I [[id number]]  (Normal) Collected: 01/05/23 2041    Specimen: Blood Updated: 01/05/23 2052     Troponin I 0.01 ng/mL      Comment: Serial Number: [id number]:  [id number]              Imaging:    XR Chest 1 View    Result Date: 1/5/2023  PROCEDURE: XR CHEST 1 VW  COMPARISON: [facility], CR, CHEST PA/AP &  LAT 2V, 6/22/2016, 4:16.  [facility], CR, CHEST PA/AP & LAT 2V, 1/25/2019, 9:35.  INDICATIONS: Chest Pain Triage Protocol  FINDINGS:   The lungs are well-expanded. The heart and pulmonary vasculature are within normal limits. No pleural effusions are identified. There are no active appearing infiltrates.  IMPRESSION: No active disease.  KENAN TALLEY MD       Electronically Signed and Approved By: KENAN TALLEY MD on 1/05/2023 at 19:24                 Differential Diagnosis and Discussion:    Syncope: Differential diagnosis includes but is not limited to TIA, hyperventilation, aortic stenosis, pulmonary emboli, myocardial disease, bradycardia arrhythmia, heart block, tachyarrhythmia, vasovagal, orthostatic hypotension, ruptured AAA, aortic dissection, subarachnoid hemorrhage, seizure, hypoglycemia.    All labs were reviewed and analyzed by me.  All X-rays were independently reviewed by me.  EKG was interpreted by me.    MDM  Number of Diagnoses or Management Options  Syncope, unspecified syncope type: new and requires workup     Amount and/or Complexity of Data Reviewed  Clinical lab tests: reviewed  Tests in the radiology section of CPT®: reviewed  Tests in the medicine section of CPT®: reviewed  Review and summarize past medical records: yes  Independent visualization of images, tracings, or specimens: yes    Risk of Complications, Morbidity, and/or Mortality  Presenting problems: moderate  Management options: low    Patient Progress  Patient progress: improved           Patient Care Considerations:    I considered ordering a noncontrast CT of the head, however Patient states his headache is mild and he denies neurologic deficits.      Consultants/Shared Management Plan:    None    Social Determinants of Health:    Patient is independent, reliable, and has access to care.       Disposition and Care Coordination:    Discharged: The patient is suitable and stable for discharge with no  need for consideration of observation or admission.    I have explained the patient´s condition, diagnoses and treatment plan based on the information available to me at this time. I have answered questions and addressed any concerns. The patient has a good  understanding of the patient´s diagnosis, condition, and treatment plan as can be expected at this point. The vital signs have been stable. The patient´s condition is stable and appropriate for discharge from the emergency department.      The patient will pursue further outpatient evaluation with the primary care physician or other designated or consulting physician as outlined in the discharge instructions. They are agreeable to this plan of care and follow-up instructions have been explained in detail. The patient has received these instructions in written format and have expressed an understanding of the discharge instructions. The patient is aware that any significant change in condition or worsening of symptoms should prompt an immediate return to this or the closest emergency department or call to 911.    Final diagnoses:   Syncope, unspecified syncope type        ED Disposition     ED Disposition   Discharge    Condition   Stable    Comment   --             This medical record created using voice recognition software.        Documentation assistance provided by Brayden [email or web address], acting as scribe for Stephen Austin MD. Information recorded by the scribe was done at my direction and has been verified and validated by me.       Brayden Mcintyre  01/05/23 2110       Stephen Austin MD  01/05/23 2114

## 2023-01-07 ENCOUNTER — APPOINTMENT (OUTPATIENT)
Dept: GENERAL RADIOLOGY | Facility: HOSPITAL | Age: 38
End: 2023-01-07
Payer: COMMERCIAL

## 2023-01-07 ENCOUNTER — HOSPITAL ENCOUNTER (EMERGENCY)
Facility: HOSPITAL | Age: 38
Discharge: HOME | End: 2023-01-07
Attending: EMERGENCY MEDICINE | Admitting: EMERGENCY MEDICINE
Payer: COMMERCIAL

## 2023-01-07 ENCOUNTER — APPOINTMENT (OUTPATIENT)
Dept: CT IMAGING | Facility: HOSPITAL | Age: 38
End: 2023-01-07
Payer: COMMERCIAL

## 2023-01-07 VITALS
HEIGHT: 72 IN | RESPIRATION RATE: 22 BRPM | TEMPERATURE: 98.5 F | OXYGEN SATURATION: 93 % | WEIGHT: 270 LBS | HEART RATE: 102 BPM | DIASTOLIC BLOOD PRESSURE: 97 MMHG | SYSTOLIC BLOOD PRESSURE: 126 MMHG | BODY MASS INDEX: 36.57 KG/M2

## 2023-01-07 DIAGNOSIS — R55 SYNCOPE, UNSPECIFIED SYNCOPE TYPE: ICD-10-CM

## 2023-01-07 DIAGNOSIS — F10.920 ALCOHOLIC INTOXICATION WITHOUT COMPLICATION: ICD-10-CM

## 2023-01-07 DIAGNOSIS — J98.01 ACUTE BRONCHOSPASM: ICD-10-CM

## 2023-01-07 DIAGNOSIS — R05.1 ACUTE COUGH: Primary | ICD-10-CM

## 2023-01-07 LAB
ALBUMIN SERPL-MCNC: 4.5 G/DL (ref 3.5–5.2)
ALBUMIN/GLOB SERPL: 1.6 G/DL
ALP SERPL-CCNC: 80 U/L (ref 39–117)
ALT SERPL W P-5'-P-CCNC: 85 U/L (ref 1–41)
ANION GAP SERPL CALCULATED.3IONS-SCNC: 13.1 MMOL/L (ref 5–15)
AST SERPL-CCNC: 65 U/L (ref 1–40)
BASOPHILS # BLD AUTO: 0.1 10*3/MM3 (ref 0–0.2)
BASOPHILS NFR BLD AUTO: 1.5 % (ref 0–1.5)
BILIRUB SERPL-MCNC: 0.6 MG/DL (ref 0–1.2)
BUN SERPL-MCNC: 7 MG/DL (ref 6–20)
BUN/CREAT SERPL: 6.5 (ref 7–25)
CALCIUM SPEC-SCNC: 9.4 MG/DL (ref 8.6–10.5)
CHLORIDE SERPL-SCNC: 102 MMOL/L (ref 98–107)
CO2 SERPL-SCNC: 26.9 MMOL/L (ref 22–29)
CREAT SERPL-MCNC: 1.08 MG/DL (ref 0.76–1.27)
D DIMER PPP FEU-MCNC: <0.27 MCGFEU/ML (ref 0–0.5)
DEPRECATED RDW RBC AUTO: 43.1 FL (ref 37–54)
EGFRCR SERPLBLD CKD-EPI 2021: 89.5 ML/MIN/1.73
EOSINOPHIL # BLD AUTO: 0.35 10*3/MM3 (ref 0–0.4)
EOSINOPHIL NFR BLD AUTO: 5.2 % (ref 0.3–6.2)
ERYTHROCYTE [DISTWIDTH] IN BLOOD BY AUTOMATED COUNT: 13.2 % (ref 12.3–15.4)
ETHANOL BLD-MCNC: 295 MG/DL (ref 0–10)
ETHANOL UR QL: 0.29 %
GLOBULIN UR ELPH-MCNC: 2.9 GM/DL
GLUCOSE SERPL-MCNC: 126 MG/DL (ref 65–99)
HCT VFR BLD AUTO: 47.9 % (ref 37.5–51)
HGB BLD-MCNC: 17 G/DL (ref 13–17.7)
HOLD SPECIMEN: NORMAL
HOLD SPECIMEN: NORMAL
IMM GRANULOCYTES # BLD AUTO: 0.02 10*3/MM3 (ref 0–0.05)
IMM GRANULOCYTES NFR BLD AUTO: 0.3 % (ref 0–0.5)
LYMPHOCYTES # BLD AUTO: 2.28 10*3/MM3 (ref 0.7–3.1)
LYMPHOCYTES NFR BLD AUTO: 33.9 % (ref 19.6–45.3)
MAGNESIUM SERPL-MCNC: 2.3 MG/DL (ref 1.6–2.6)
MCH RBC QN AUTO: 32.1 PG (ref 26.6–33)
MCHC RBC AUTO-ENTMCNC: 35.5 G/DL (ref 31.5–35.7)
MCV RBC AUTO: 90.4 FL (ref 79–97)
MONOCYTES # BLD AUTO: 0.6 10*3/MM3 (ref 0.1–0.9)
MONOCYTES NFR BLD AUTO: 8.9 % (ref 5–12)
NEUTROPHILS NFR BLD AUTO: 3.37 10*3/MM3 (ref 1.7–7)
NEUTROPHILS NFR BLD AUTO: 50.2 % (ref 42.7–76)
NRBC BLD AUTO-RTO: 0 /100 WBC (ref 0–0.2)
PLATELET # BLD AUTO: 147 10*3/MM3 (ref 140–450)
PMV BLD AUTO: 9 FL (ref 6–12)
POTASSIUM SERPL-SCNC: 3.6 MMOL/L (ref 3.5–5.2)
PROT SERPL-MCNC: 7.4 G/DL (ref 6–8.5)
RBC # BLD AUTO: 5.3 10*6/MM3 (ref 4.14–5.8)
SODIUM SERPL-SCNC: 142 MMOL/L (ref 136–145)
TROPONIN T SERPL-MCNC: <0.01 NG/ML (ref 0–0.03)
WBC NRBC COR # BLD: 6.72 10*3/MM3 (ref 3.4–10.8)
WHOLE BLOOD HOLD COAG: NORMAL
WHOLE BLOOD HOLD SPECIMEN: NORMAL

## 2023-01-07 PROCEDURE — 99284 EMERGENCY DEPT VISIT MOD MDM: CPT

## 2023-01-07 PROCEDURE — 70450 CT HEAD/BRAIN W/O DYE: CPT

## 2023-01-07 PROCEDURE — 96374 THER/PROPH/DIAG INJ IV PUSH: CPT

## 2023-01-07 PROCEDURE — 93010 ELECTROCARDIOGRAM REPORT: CPT | Performed by: INTERNAL MEDICINE

## 2023-01-07 PROCEDURE — 94799 UNLISTED PULMONARY SVC/PX: CPT

## 2023-01-07 PROCEDURE — 85379 FIBRIN DEGRADATION QUANT: CPT | Performed by: EMERGENCY MEDICINE

## 2023-01-07 PROCEDURE — 84484 ASSAY OF TROPONIN QUANT: CPT | Performed by: EMERGENCY MEDICINE

## 2023-01-07 PROCEDURE — 85025 COMPLETE CBC W/AUTO DIFF WBC: CPT

## 2023-01-07 PROCEDURE — 71045 X-RAY EXAM CHEST 1 VIEW: CPT

## 2023-01-07 PROCEDURE — 82077 ASSAY SPEC XCP UR&BREATH IA: CPT | Performed by: EMERGENCY MEDICINE

## 2023-01-07 PROCEDURE — 83735 ASSAY OF MAGNESIUM: CPT | Performed by: EMERGENCY MEDICINE

## 2023-01-07 PROCEDURE — 25010000002 METHYLPREDNISOLONE PER 125 MG: Performed by: EMERGENCY MEDICINE

## 2023-01-07 PROCEDURE — 93005 ELECTROCARDIOGRAM TRACING: CPT

## 2023-01-07 PROCEDURE — 93005 ELECTROCARDIOGRAM TRACING: CPT | Performed by: EMERGENCY MEDICINE

## 2023-01-07 PROCEDURE — 94640 AIRWAY INHALATION TREATMENT: CPT

## 2023-01-07 PROCEDURE — 80053 COMPREHEN METABOLIC PANEL: CPT | Performed by: EMERGENCY MEDICINE

## 2023-01-07 RX ORDER — ALBUTEROL SULFATE 2.5 MG/3ML
2.5 SOLUTION RESPIRATORY (INHALATION)
Status: COMPLETED | OUTPATIENT
Start: 2023-01-07 | End: 2023-01-07

## 2023-01-07 RX ORDER — PREDNISONE 50 MG/1
50 TABLET ORAL DAILY
Qty: 4 TABLET | Refills: 0 | Status: SHIPPED | OUTPATIENT
Start: 2023-01-07

## 2023-01-07 RX ORDER — ALBUTEROL SULFATE 90 UG/1
2 AEROSOL, METERED RESPIRATORY (INHALATION) EVERY 4 HOURS PRN
Qty: 8 G | Refills: 0 | Status: SHIPPED | OUTPATIENT
Start: 2023-01-07

## 2023-01-07 RX ORDER — SODIUM CHLORIDE 0.9 % (FLUSH) 0.9 %
10 SYRINGE (ML) INJECTION AS NEEDED
Status: DISCONTINUED | OUTPATIENT
Start: 2023-01-07 | End: 2023-01-07 | Stop reason: HOSPADM

## 2023-01-07 RX ORDER — METHYLPREDNISOLONE SODIUM SUCCINATE 125 MG/2ML
125 INJECTION, POWDER, LYOPHILIZED, FOR SOLUTION INTRAMUSCULAR; INTRAVENOUS ONCE
Status: COMPLETED | OUTPATIENT
Start: 2023-01-07 | End: 2023-01-07

## 2023-01-07 RX ADMIN — ALBUTEROL SULFATE 2.5 MG: 2.5 SOLUTION RESPIRATORY (INHALATION) at 02:04

## 2023-01-07 RX ADMIN — METHYLPREDNISOLONE SODIUM SUCCINATE 125 MG: 125 INJECTION, POWDER, FOR SOLUTION INTRAMUSCULAR; INTRAVENOUS at 03:26

## 2023-01-07 RX ADMIN — SODIUM CHLORIDE, POTASSIUM CHLORIDE, SODIUM LACTATE AND CALCIUM CHLORIDE 1000 ML: 600; 310; 30; 20 INJECTION, SOLUTION INTRAVENOUS at 02:08

## 2023-01-07 RX ADMIN — ALBUTEROL SULFATE 2.5 MG: 2.5 SOLUTION RESPIRATORY (INHALATION) at 02:09

## 2023-01-07 RX ADMIN — ALBUTEROL SULFATE 2.5 MG: 2.5 SOLUTION RESPIRATORY (INHALATION) at 02:06

## 2023-01-07 NOTE — Clinical Note
[facility] EMERGENCY ROOM  [address] [place] [address]  Phone: [phone]    Denise Balbuena accompanied Kurt Balbuena to the emergency department on 1/7/2023. They may return to work on 01/08/2023.        Thank you for choosing [facility].    Peggy Boggs RN

## 2023-01-07 NOTE — Clinical Note
[facility] EMERGENCY ROOM  [address] [place] [address]  Phone: [phone]    Kurt Balbuena was seen and treated in our emergency department on 1/7/2023.  He may return to work on 01/08/2023.         Thank you for choosing [facility].    Peggy Boggs RN

## 2023-01-07 NOTE — ED PROVIDER NOTES
"Time: 1:13 AM EST  Date of encounter:  1/7/2023  Independent Historian/Clinical History and Information was obtained by:   Patient  Chief Complaint: Headache    History is limited by: N/A    History of Present Illness:      Patient is a 37 y.o. year old male who presents to the emergency department for evaluation of a headache. Pt reports a syncope episode that occurred today and yesterday. Pt reports no symptoms prior to his episode. Pt reports dull occipital scalp pain. Pt reports left side weakness. Pt reports left eye vision changes. Pt reports SOB for about a week. Pt denies any pain while breathing. Pt reports aching left leg pain. Pt denies fever, chills, or cough. Pt reports not taking any medication. Pt reports being a former smoker and drinking \"two beers a night.\" Pt reports chest pain. Pt states that he does not want to get CT scans done on his blood vessels because he doesn't want to ingest any dye.       History provided by:  Patient   used: No        Patient Care Team  Primary Care Provider: Susan Singh APRN    Past Medical History:     Allergies   Allergen Reactions   • Benadryl [Diphenhydramine] Hallucinations     Past Medical History:   Diagnosis Date   • Hernia of abdominal wall    • Hypertension    • MI (myocardial infarction) (HCC)      Past Surgical History:   Procedure Laterality Date   • APPENDECTOMY       History reviewed. No pertinent family history.    Home Medications:  Prior to Admission medications    Not on File        Social History:   Social History     Tobacco Use   • Smoking status: Never   • Smokeless tobacco: Current     Types: Snuff   Vaping Use   • Vaping Use: Never used   Substance Use Topics   • Alcohol use: Yes     Alcohol/week: 14.0 standard drinks     Types: 14 Cans of beer per week   • Drug use: Never         Review of Systems:  Review of Systems   Constitutional: Negative for chills and fever.   HENT: Negative for congestion, rhinorrhea and " "sore throat.    Eyes: Positive for visual disturbance. Negative for pain.   Respiratory: Positive for shortness of breath. Negative for apnea, cough and chest tightness.    Cardiovascular: Positive for chest pain and leg swelling. Negative for palpitations.   Gastrointestinal: Negative for abdominal pain, diarrhea, nausea and vomiting.   Genitourinary: Negative for difficulty urinating and dysuria.   Musculoskeletal: Negative for joint swelling and myalgias.   Skin: Negative for color change.   Neurological: Positive for syncope, weakness and headaches. Negative for seizures.   Psychiatric/Behavioral: Negative.    All other systems reviewed and are negative.       Physical Exam:  /97   Pulse 102   Temp 98.5 °F (36.9 °C) (Oral)   Resp 22   Ht 182.9 cm (72\")   Wt 122 kg (270 lb)   SpO2 93%   BMI 36.62 kg/m²     Physical Exam  Vitals and nursing note reviewed.   Constitutional:       General: He is not in acute distress.     Appearance: He is toxic-appearing.   HENT:      Head: Normocephalic and atraumatic.      Jaw: There is normal jaw occlusion.   Eyes:      General: Lids are normal.      Extraocular Movements: Extraocular movements intact.      Conjunctiva/sclera: Conjunctivae normal.      Pupils: Pupils are equal, round, and reactive to light.   Cardiovascular:      Rate and Rhythm: Regular rhythm. Tachycardia present.      Pulses: Normal pulses.      Heart sounds: Normal heart sounds.   Pulmonary:      Effort: Pulmonary effort is normal. No respiratory distress.      Breath sounds: No rhonchi.      Comments: dyarthria  Abdominal:      General: Abdomen is flat.      Palpations: Abdomen is soft.      Tenderness: There is no abdominal tenderness. There is no guarding or rebound.   Musculoskeletal:         General: Normal range of motion.      Cervical back: Normal range of motion and neck supple.      Right lower leg: No edema.      Left lower leg: No edema.   Skin:     General: Skin is warm and dry. "   Neurological:      Mental Status: He is alert and oriented to person, place, and time. Mental status is at baseline.      Cranial Nerves: Dysarthria present.   Psychiatric:         Mood and Affect: Mood normal.                  Procedures:  Procedures      Medical Decision Making:      Comorbidities that affect care:    Hypertension, Smoking    External Notes reviewed:    None      The following orders were placed and all results were independently analyzed by me:  Orders Placed This Encounter   Procedures   • XR Chest 1 View   • CT Head Without Contrast   • [place] Draw   • Comprehensive Metabolic Panel   • Troponin   • Magnesium   • CBC Auto Differential   • Ethanol   • D-dimer, Quantitative   • Undress & Gown   • Continuous Pulse Oximetry   • Vital Signs   • POC Glucose Once   • ECG 12 Lead ED Triage Standing Order; Weak / Dizzy / AMS   • CBC & Differential   • Green Top (Gel)   • Lavender Top   • Gold Top - SST   • Light Blue Top       Medications Given in the Emergency Department:  Medications   lactated ringers bolus 1,000 mL (0 mL Intravenous Stopped 1/7/23 0238)   albuterol (PROVENTIL) nebulizer solution 0.083% 2.5 mg/3mL (2.5 mg Nebulization Given 1/7/23 0209)   methylPREDNISolone sodium succinate (SOLU-Medrol) injection 125 mg (125 mg Intravenous Given 1/7/23 0326)        ED Course:    ED Course as of 01/07/23 0745   Sat Jan 07, 2023   0104 EKG: Sinus tachycardia 105, normal P wave, normal QRS, normal ST, normal QT, no change. [JS]   0310 Patient's D-dimer is within normal limits.  His heart rate began to normalize with IV fluids.  He felt symptomatically improved after beta agonist therapy.  We will continue with corticosteroids and beta agonist for home.  We discussed his alcohol intoxication.  I encouraged him to follow-up with his primary care provider.  We discussed return precautions including worsening symptoms or any additional concerns. [JS]      ED Course User Index  [JS] Lm Ruth MD        Labs:    Lab Results (last 24 hours)     Procedure Component Value Units Date/Time    CBC & Differential [[id number]] Collected: 01/07/23 0033    Specimen: Blood Updated: 01/07/23 0057    Narrative:      The following orders were created for panel order CBC & Differential.  Procedure                               Abnormality         Status                     ---------                               -----------         ------                     CBC Auto Differential[[id number]]        Normal              Final result               Scan Slide[[id number]]                                                                    Please view results for these tests on the individual orders.    Comprehensive Metabolic Panel [[id number]]  (Abnormal) Collected: 01/07/23 0033    Specimen: Blood Updated: 01/07/23 0104     Glucose 126 mg/dL      BUN 7 mg/dL      Creatinine 1.08 mg/dL      Sodium 142 mmol/L      Potassium 3.6 mmol/L      Chloride 102 mmol/L      CO2 26.9 mmol/L      Calcium 9.4 mg/dL      Total Protein 7.4 g/dL      Albumin 4.5 g/dL      ALT (SGPT) 85 U/L      AST (SGOT) 65 U/L      Alkaline Phosphatase 80 U/L      Total Bilirubin 0.6 mg/dL      Globulin 2.9 gm/dL      A/G Ratio 1.6 g/dL      BUN/Creatinine Ratio 6.5     Anion Gap 13.1 mmol/L      eGFR 89.5 mL/min/1.73      Comment: National Kidney Foundation and American Society of Nephrology (ASN) Task Force recommended calculation based on the Chronic Kidney Disease Epidemiology Collaboration (CKD-EPI) equation refit without adjustment for race.       Narrative:      GFR Normal >60  Chronic Kidney Disease <60  Kidney Failure <15      Troponin [[id number]]  (Normal) Collected: 01/07/23 0033    Specimen: Blood Updated: 01/07/23 0104     Troponin T <0.010 ng/mL     Narrative:      Troponin T Reference Range:  <= 0.03 ng/mL-   Negative for AMI  >0.03 ng/mL-     Abnormal for myocardial necrosis.  Clinicians would have to utilize clinical acumen, EKG, Troponin and  serial changes to determine if it is an Acute Myocardial Infarction or myocardial injury due to an underlying chronic condition.       Results may be falsely decreased if patient taking Biotin.      Magnesium [[id number]]  (Normal) Collected: 01/07/23 0033    Specimen: Blood Updated: 01/07/23 0104     Magnesium 2.3 mg/dL     CBC Auto Differential [[id number]]  (Normal) Collected: 01/07/23 0033    Specimen: Blood Updated: 01/07/23 0057     WBC 6.72 10*3/mm3      RBC 5.30 10*6/mm3      Hemoglobin 17.0 g/dL      Hematocrit 47.9 %      MCV 90.4 fL      MCH 32.1 pg      MCHC 35.5 g/dL      RDW 13.2 %      RDW-SD 43.1 fl      MPV 9.0 fL      Platelets 147 10*3/mm3      Neutrophil % 50.2 %      Lymphocyte % 33.9 %      Monocyte % 8.9 %      Eosinophil % 5.2 %      Basophil % 1.5 %      Immature Grans % 0.3 %      Neutrophils, Absolute 3.37 10*3/mm3      Lymphocytes, Absolute 2.28 10*3/mm3      Monocytes, Absolute 0.60 10*3/mm3      Eosinophils, Absolute 0.35 10*3/mm3      Basophils, Absolute 0.10 10*3/mm3      Immature Grans, Absolute 0.02 10*3/mm3      nRBC 0.0 /100 WBC     Ethanol [[id number]]  (Abnormal) Collected: 01/07/23 0033    Specimen: Blood Updated: 01/07/23 0109     Ethanol 295 mg/dL      Ethanol % 0.295 %     Narrative:      Ethanol (Plasma)  <10 Essentially Negative    Toxic Concentrations           mg/dL    Flushing, slowing of reflexes    Impaired visual activity         Depression of CNS              >100  Possible Coma                  >300       D-dimer, Quantitative [[id number]]  (Normal) Collected: 01/07/23 0033    Specimen: Blood Updated: 01/07/23 0149     D-Dimer, Quantitative <0.27 MCGFEU/mL     Narrative:      According to the assay 's published package insert, a normal (<0.50 MCGFEU/mL) D-dimer result in conjunction with a non-high clinical probability assessment, excludes deep vein thrombosis (DVT) and pulmonary embolism (PE) with high sensitivity.    D-dimer values increase  "with age and this can make VTE exclusion of an older population difficult. To address this, the American College of Physicians, based on best available evidence and recent guidelines, recommends that clinicians use age-adjusted D-dimer thresholds in patients greater than 50 years of age with: a) a low probability of PE who do not meet all Pulmonary Embolism Rule Out Criteria, or b) in those with intermediate probability of PE.   The formula for an age-adjusted D-dimer cut-off is \"age/100\".  For example, a 60 year old patient would have an age-adjusted cut-off of 0.60 MCGFEU/mL and an 80 year old 0.80 MCGFEU/mL.           Imaging:    CT Head Without Contrast    Result Date: 1/7/2023  PROCEDURE: CT HEAD WO CONTRAST  COMPARISON: None.  INDICATIONS: Syncope/pre-syncope; cerebrovascular cause suspected.  PROTOCOL:   Standard CT imaging protocol performed.    RADIATION:   Total DLP: 1,018.2 mGy*cm   MA and/or KV were/was adjusted to minimize radiation dose.    TECHNIQUE: After obtaining the patient's consent, 130 CT images were obtained without non-ionic intravenous contrast material.  DISCUSSION:   A routine nonenhanced head CT was performed.  No acute brain abnormality is identified.  No acute intracranial hemorrhage.  No acute infarct is seen.  No acute skull fracture.  No midline shift or acute intracranial mass effect is seen.  The ventricular size and configuration are within normal limits.  There may be mild cerebellar tonsillar ectopia.  Minimal mucosal thickening involves the imaged paranasal sinuses.  No air-fluid interfaces are seen within the imaged paranasal sinuses.  No significant acute findings are seen with regard to the imaged orbits or middle ear clefts.  There is chronic leftward nasal septal deviation (about 7 mm) with an associated nasal spur, as seen on image 8 of series 202.       No acute brain abnormality is seen. Specifically, no acute intracranial hemorrhage, no acute infarct, no intracranial " mass lesion, and no acute intracranial mass effect are appreciated.   Please note that portions of this note were completed with a voice recognition program.  ASHLEY HONEYCUTT JR, MD       Electronically Signed and Approved By: ASHLEY HONEYCUTT JR, MD on 1/07/2023 at 2:44              XR Chest 1 View    Result Date: 1/7/2023  PROCEDURE: XR CHEST 1 VW  COMPARISON: 1/5/2023, 1855 hours.  INDICATIONS: UNSPECIFIED WEAKNESS; ALTERED MENTAL STATUS.  FINDINGS:  A single AP (or PA) upright portable chest radiograph was performed.  No cardiac enlargement is seen.  No acute infiltrate is appreciated.  No pleural effusion or pneumothorax is identified.  Chronic calcified granulomatous disease involves the chest.  External artifacts obscure detail.  There may be minimal biapical pleural-parenchymal scarring.  Degenerative changes involve the imaged spine.  No significant interval change is seen since the prior study (or studies).        No acute infiltrate is appreciated.     Please note that portions of this note were completed with a voice recognition program.  ASHLEY HONEYCUTT JR, MD       Electronically Signed and Approved By: ASHLEY HONEYCUTT JR, MD on 1/07/2023 at 1:30                  Differential Diagnosis and Discussion:    Chest Pain:  Based on the patient's signs and symptoms, I considered aortic dissection, myocardial infaction, pulmonary embolism, cardiac tamponade, pericarditis, pneumothorax, musculoskeletal chest pain and other differential diagnosis as an etiology of the patient's chest pain.   Dyspnea: Differential diagnosis includes but is not limited to metabolic acidosis, neurological disorders, psychogenic, asthma, pneumothorax, upper airway obstruction, COPD, pneumonia, noncardiogenic pulmonary edema, interstitial lung disease, anemia, congestive heart failure, and pulmonary embolism  Extremity Pain: Differential diagnosis includes but is not limited to soft tissue sprain, tendonitis, tendon injury, dislocation,  fracture, deep vein thrombosis, arterial insufficiency, osteoarthritis, bursitis, and ligamentous damage.  Eye Pain/Blurred Vision: Differential diagnosis includes but is not limited to dacryocystitis, hordeolum, chalazion, periorbital cellulitis, cavernous sinus thrombosis, blepharitis, and glaucoma.  Headache: Differential diagnosis includes but is not limited to migraine, cluster headache, hypertension, tumor, subarachnoid bleeding, pseudotumor cerebri, temporal arteritis, infections, tension headache, and TMJ syndrome.  Syncope: Differential diagnosis includes but is not limited to TIA, hyperventilation, aortic stenosis, pulmonary emboli, myocardial disease, bradycardia arrhythmia, heart block, tachyarrhythmia, vasovagal, orthostatic hypotension, ruptured AAA, aortic dissection, subarachnoid hemorrhage, seizure, hypoglycemia.  Weakness: Based on the patient's history, signs, and symptoms, the diffential diagnosis includes but is not limited to meningitis, stroke, sepsis, subarachnoid hemorrhage, intracranial bleeding, encephalitis, acute uti, dehydration, MS, myasthenia gravis, Guillan [place], migraine variant, neuromuscular disorders vertigo, electrolyte imbalance, and metabolic disorders.    All labs were reviewed and analyzed by me.    [facility]         Patient Care Considerations:    None      Consultants/Shared Management Plan:    None    Social Determinants of Health:    Patient has presented with family members who are responsible, reliable and will ensure follow up care.      Disposition and Care Coordination:    Discharged: I considered escalation of care by admitting this patient for observation, however the patient has improved and is suitable and  stable for discharge.    I have explained the patient´s condition, diagnoses and treatment plan based on the information available to me at this time. I have answered questions and addressed any concerns. The patient has a good  understanding of the patient´s  diagnosis, condition, and treatment plan as can be expected at this point. The vital signs have been stable. The patient´s condition is stable and appropriate for discharge from the emergency department.      The patient will pursue further outpatient evaluation with the primary care physician or other designated or consulting physician as outlined in the discharge instructions. They are agreeable to this plan of care and follow-up instructions have been explained in detail. The patient has received these instructions in written format and have expressed an understanding of the discharge instructions. The patient is aware that any significant change in condition or worsening of symptoms should prompt an immediate return to this or the closest emergency department or call to 911.    Final diagnoses:   Acute cough   Acute bronchospasm   Syncope, unspecified syncope type   Alcoholic intoxication without complication (HCC)        ED Disposition     ED Disposition   Discharge    Condition   Stable    Comment   --             This medical record created using voice recognition software.        Documentation assistance provided by Molina Vasquez acting as scribe for Lm Ruth MD. Information recorded by the scribe was done at my direction and has been verified and validated by me.        Molina Vasquez  01/07/23 0132       Lm Ruth MD  01/07/23 2074

## 2023-01-12 LAB — QT INTERVAL: 328 MS

## 2023-01-17 ENCOUNTER — APPOINTMENT (OUTPATIENT)
Dept: GENERAL RADIOLOGY | Facility: HOSPITAL | Age: 38
End: 2023-01-17
Payer: COMMERCIAL

## 2023-01-17 ENCOUNTER — HOSPITAL ENCOUNTER (EMERGENCY)
Facility: HOSPITAL | Age: 38
Discharge: HOME | End: 2023-01-18
Attending: EMERGENCY MEDICINE | Admitting: EMERGENCY MEDICINE
Payer: COMMERCIAL

## 2023-01-17 DIAGNOSIS — R55 SYNCOPE AND COLLAPSE: ICD-10-CM

## 2023-01-17 DIAGNOSIS — I95.2 HYPOTENSION DUE TO MEDICATION: ICD-10-CM

## 2023-01-17 DIAGNOSIS — R07.9 CHEST PAIN, UNSPECIFIED TYPE: Primary | ICD-10-CM

## 2023-01-17 LAB
ALBUMIN SERPL-MCNC: 4.3 G/DL (ref 3.5–5.2)
ALBUMIN/GLOB SERPL: 1.5 G/DL
ALP SERPL-CCNC: 75 U/L (ref 39–117)
ALT SERPL W P-5'-P-CCNC: 92 U/L (ref 1–41)
ANION GAP SERPL CALCULATED.3IONS-SCNC: 11.5 MMOL/L (ref 5–15)
AST SERPL-CCNC: 43 U/L (ref 1–40)
BASOPHILS # BLD AUTO: 0.1 10*3/MM3 (ref 0–0.2)
BASOPHILS NFR BLD AUTO: 1.6 % (ref 0–1.5)
BILIRUB SERPL-MCNC: 0.5 MG/DL (ref 0–1.2)
BUN SERPL-MCNC: 8 MG/DL (ref 6–20)
BUN/CREAT SERPL: 7.5 (ref 7–25)
CALCIUM SPEC-SCNC: 9.2 MG/DL (ref 8.6–10.5)
CHLORIDE SERPL-SCNC: 105 MMOL/L (ref 98–107)
CO2 SERPL-SCNC: 25.5 MMOL/L (ref 22–29)
CREAT SERPL-MCNC: 1.07 MG/DL (ref 0.76–1.27)
DEPRECATED RDW RBC AUTO: 43 FL (ref 37–54)
EGFRCR SERPLBLD CKD-EPI 2021: 90.5 ML/MIN/1.73
EOSINOPHIL # BLD AUTO: 0.23 10*3/MM3 (ref 0–0.4)
EOSINOPHIL NFR BLD AUTO: 3.6 % (ref 0.3–6.2)
ERYTHROCYTE [DISTWIDTH] IN BLOOD BY AUTOMATED COUNT: 13.1 % (ref 12.3–15.4)
GLOBULIN UR ELPH-MCNC: 2.9 GM/DL
GLUCOSE SERPL-MCNC: 108 MG/DL (ref 65–99)
HCT VFR BLD AUTO: 46.3 % (ref 37.5–51)
HGB BLD-MCNC: 16.7 G/DL (ref 13–17.7)
HOLD SPECIMEN: NORMAL
HOLD SPECIMEN: NORMAL
IMM GRANULOCYTES # BLD AUTO: 0.03 10*3/MM3 (ref 0–0.05)
IMM GRANULOCYTES NFR BLD AUTO: 0.5 % (ref 0–0.5)
LIPASE SERPL-CCNC: 94 U/L (ref 13–60)
LYMPHOCYTES # BLD AUTO: 1.93 10*3/MM3 (ref 0.7–3.1)
LYMPHOCYTES NFR BLD AUTO: 30.1 % (ref 19.6–45.3)
MAGNESIUM SERPL-MCNC: 2.3 MG/DL (ref 1.6–2.6)
MCH RBC QN AUTO: 32.4 PG (ref 26.6–33)
MCHC RBC AUTO-ENTMCNC: 36.1 G/DL (ref 31.5–35.7)
MCV RBC AUTO: 89.9 FL (ref 79–97)
MONOCYTES # BLD AUTO: 0.53 10*3/MM3 (ref 0.1–0.9)
MONOCYTES NFR BLD AUTO: 8.3 % (ref 5–12)
NEUTROPHILS NFR BLD AUTO: 3.6 10*3/MM3 (ref 1.7–7)
NEUTROPHILS NFR BLD AUTO: 55.9 % (ref 42.7–76)
NRBC BLD AUTO-RTO: 0 /100 WBC (ref 0–0.2)
NT-PROBNP SERPL-MCNC: <36 PG/ML (ref 0–450)
PLATELET # BLD AUTO: 193 10*3/MM3 (ref 140–450)
PMV BLD AUTO: 9 FL (ref 6–12)
POTASSIUM SERPL-SCNC: 4.2 MMOL/L (ref 3.5–5.2)
PROT SERPL-MCNC: 7.2 G/DL (ref 6–8.5)
RBC # BLD AUTO: 5.15 10*6/MM3 (ref 4.14–5.8)
SODIUM SERPL-SCNC: 142 MMOL/L (ref 136–145)
TROPONIN I SERPL-MCNC: 0 NG/ML (ref 0–0.08)
TROPONIN I SERPL-MCNC: 0.01 NG/ML (ref 0–0.08)
WBC NRBC COR # BLD: 6.42 10*3/MM3 (ref 3.4–10.8)
WHOLE BLOOD HOLD COAG: NORMAL
WHOLE BLOOD HOLD SPECIMEN: NORMAL

## 2023-01-17 PROCEDURE — 93005 ELECTROCARDIOGRAM TRACING: CPT

## 2023-01-17 PROCEDURE — 82077 ASSAY SPEC XCP UR&BREATH IA: CPT | Performed by: EMERGENCY MEDICINE

## 2023-01-17 PROCEDURE — 83690 ASSAY OF LIPASE: CPT

## 2023-01-17 PROCEDURE — 99284 EMERGENCY DEPT VISIT MOD MDM: CPT

## 2023-01-17 PROCEDURE — 71045 X-RAY EXAM CHEST 1 VIEW: CPT

## 2023-01-17 PROCEDURE — 36415 COLL VENOUS BLD VENIPUNCTURE: CPT

## 2023-01-17 PROCEDURE — 93010 ELECTROCARDIOGRAM REPORT: CPT | Performed by: INTERNAL MEDICINE

## 2023-01-17 PROCEDURE — 84484 ASSAY OF TROPONIN QUANT: CPT

## 2023-01-17 PROCEDURE — 83880 ASSAY OF NATRIURETIC PEPTIDE: CPT

## 2023-01-17 PROCEDURE — 93005 ELECTROCARDIOGRAM TRACING: CPT | Performed by: EMERGENCY MEDICINE

## 2023-01-17 PROCEDURE — 80053 COMPREHEN METABOLIC PANEL: CPT

## 2023-01-17 PROCEDURE — 85025 COMPLETE CBC W/AUTO DIFF WBC: CPT

## 2023-01-17 PROCEDURE — 83735 ASSAY OF MAGNESIUM: CPT

## 2023-01-17 RX ORDER — ASPIRIN 81 MG/1
324 TABLET, CHEWABLE ORAL ONCE
Status: DISCONTINUED | OUTPATIENT
Start: 2023-01-17 | End: 2023-01-18 | Stop reason: HOSPADM

## 2023-01-17 RX ORDER — SODIUM CHLORIDE 0.9 % (FLUSH) 0.9 %
10 SYRINGE (ML) INJECTION AS NEEDED
Status: DISCONTINUED | OUTPATIENT
Start: 2023-01-17 | End: 2023-01-18 | Stop reason: HOSPADM

## 2023-01-17 NOTE — Clinical Note
[facility] EMERGENCY ROOM  [address]  [place] [address]  Phone: [phone]    Traci Balbuena accompanied Kurt Balbuena to the emergency department on 1/17/2023. They may return to work on 01/19/2023.        Thank you for choosing [facility].    Judit Vidal RN

## 2023-01-17 NOTE — Clinical Note
[facility] EMERGENCY ROOM  [address] [address]  Phone: [phone]    Kurt Balbuena was seen and treated in our emergency department on 1/17/2023.  He may return to work on 01/19/2023.         Thank you for choosing [facility].    Bakari Swanson MD

## 2023-01-18 VITALS
DIASTOLIC BLOOD PRESSURE: 72 MMHG | HEIGHT: 68 IN | SYSTOLIC BLOOD PRESSURE: 114 MMHG | BODY MASS INDEX: 41.67 KG/M2 | HEART RATE: 92 BPM | RESPIRATION RATE: 16 BRPM | OXYGEN SATURATION: 96 % | TEMPERATURE: 98.6 F | WEIGHT: 274.91 LBS

## 2023-01-18 LAB
ETHANOL BLD-MCNC: 194 MG/DL (ref 0–10)
ETHANOL UR QL: 0.19 %
QT INTERVAL: 328 MS
QT INTERVAL: 337 MS

## 2023-01-18 RX ADMIN — SODIUM CHLORIDE 1000 ML: 9 INJECTION, SOLUTION INTRAVENOUS at 00:27

## 2023-01-18 NOTE — DISCHARGE INSTRUCTIONS
Your EKG of the heart and chest x-ray looked okay and both sets your heart enzymes came back normal today.    No signs of a heart attack or anything serious or life-threatening was found.    It does appear that your blood pressure here is fairly on the low side, most likely due to you taking the lisinopril 20 mg daily.    For now, make sure you drink plenty of fluids to stay hydrated and go ahead and just start taking one half of your lisinopril pill (only take 10 mg a day instead of 20 mg).    Make sure you follow-up with your cardiologist as discussed regarding these chest pains.

## 2023-01-18 NOTE — ED PROVIDER NOTES
Time: 00:58 AM EST  Date of encounter:  1/17/2023  Independent Historian/Clinical History and Information was obtained by:   Patient and Family  Chief Complaint: Syncope, lightheadedness, chest pain    History is limited by: N/A    History of Present Illness:  Patient is a 37 y.o. year old male who presents to the emergency department for evaluation of syncopal episodes this week in the setting of being started on lisinopril 20 mg for high blood pressure.    He also had some substernal chest pain today at rest.  The pain completely resolved after EMS gave him some nitroglycerin and aspirin prior to arrival.    He denies any worsening pain when he breathes in deep.    He denies any recent fevers or cough or congestion.        HPI    Patient Care Team  Primary Care Provider: Susan Singh APRN    Past Medical History:     Allergies   Allergen Reactions   • Benadryl [Diphenhydramine] Hallucinations     Past Medical History:   Diagnosis Date   • Hernia of abdominal wall    • Hypertension    • MI (myocardial infarction) (HCC)      Past Surgical History:   Procedure Laterality Date   • APPENDECTOMY       History reviewed. No pertinent family history.    Home Medications:  Prior to Admission medications    Medication Sig Start Date End Date Taking? Authorizing Provider   albuterol sulfate  (90 Base) MCG/ACT inhaler Inhale 2 puffs Every 4 (Four) Hours As Needed for Wheezing. 1/7/23   Lm Ruth MD   predniSONE (DELTASONE) 50 MG tablet Take 1 tablet by mouth Daily. 1/7/23   Lm Ruth MD        Social History:   Social History     Tobacco Use   • Smoking status: Never   • Smokeless tobacco: Current     Types: Snuff   Vaping Use   • Vaping Use: Never used   Substance Use Topics   • Alcohol use: Yes     Alcohol/week: 14.0 standard drinks     Types: 14 Cans of beer per week   • Drug use: Never         Review of Systems:  Review of Systems   I performed a 10 point review of systems which was all negative,  "except for the positives found in the HPI above.  Physical Exam:  /72 (BP Location: Right arm, Patient Position: Lying)   Pulse 92   Temp 98.6 °F (37 °C) (Oral)   Resp 16   Ht 172.7 cm (68\")   Wt 125 kg (274 lb 14.6 oz)   SpO2 96%   BMI 41.80 kg/m²     Physical Exam   General: Awake alert and in no obvious distress    HEENT: Head normocephalic atraumatic, eyes PERRLA EOMI, nose normal, oropharynx normal.    Neck: Supple full range of motion, no meningismus, no lymphadenopathy    Heart: Regular rate and rhythm, no murmurs or rubs, 2+ radial pulses bilaterally    Lungs: Clear to auscultation bilaterally without wheezes or crackles, no respiratory distress    Abdomen: Soft, nontender, nondistended, no rebound or guarding    Skin: Warm, dry, no rash    Musculoskeletal: Normal range of motion, no lower extremity edema    Neurologic: Oriented x3, no motor deficits no sensory deficits    Psychiatric: Mood appears stable, no psychosis          Procedures:  Procedures      Medical Decision Making:      Comorbidities that affect care:    Coronary Artery Disease, Hypertension    External Notes reviewed:    Previous ED Note      The following orders were placed and all results were independently analyzed by me:  Orders Placed This Encounter   Procedures   • XR Chest 1 View   • [place] Draw   • Comprehensive Metabolic Panel   • Lipase   • BNP   • Magnesium   • CBC Auto Differential   • Ethanol   • NPO Diet NPO Type: Strict NPO   • Undress & Gown   • Cardiac Monitoring   • Continuous Pulse Oximetry   • Oxygen Therapy- Nasal Cannula; 2 LPM; Titrate for SPO2: equal to or greater than, 92%   • POC Troponin I   • POC Troponin I   • POC Troponin I   • POC Troponin I   • ECG 12 Lead ED Triage Standing Order; Chest Pain   • ECG 12 Lead ED Triage Standing Order; Chest Pain   • Insert Peripheral IV   • POC Troponin I with Hold Tube   • CBC & Differential   • Green Top (Gel)   • Lavender Top   • Gold Top - SST   • Light Blue " Top   • HOLD Troponin-I Tube   • HOLD Troponin-I Tube       Medications Given in the Emergency Department:  Medications   sodium chloride 0.9 % flush 10 mL (has no administration in time range)   aspirin chewable tablet 324 mg (0 mg Oral Hold 1/17/23 2104)   sodium chloride 0.9 % bolus 1,000 mL (0 mL Intravenous Stopped 1/18/23 0057)        ED Course:    ED Course as of 01/18/23 0303 Wed Jan 18, 2023   0005 EKG: I have reviewed and interpreted his twelve-lead EKG as normal sinus rhythm at 95 bpm, normal P waves, normal QRS, normal ST segments, normal T waves; no acute ischemia or ectopy, unchanged from old EKG done last week. [VS]      ED Course User Index  [VS] Bakari Swanson MD       Labs:    Lab Results (last 24 hours)     Procedure Component Value Units Date/Time    CBC & Differential [[id number]]  (Abnormal) Collected: 01/17/23 2109    Specimen: Blood Updated: 01/17/23 2127    Narrative:      The following orders were created for panel order CBC & Differential.  Procedure                               Abnormality         Status                     ---------                               -----------         ------                     CBC Auto Differential[[id number]]        Abnormal            Final result                 Please view results for these tests on the individual orders.    Comprehensive Metabolic Panel [[id number]]  (Abnormal) Collected: 01/17/23 2109    Specimen: Blood Updated: 01/17/23 2149     Glucose 108 mg/dL      BUN 8 mg/dL      Creatinine 1.07 mg/dL      Sodium 142 mmol/L      Potassium 4.2 mmol/L      Comment: Slight hemolysis detected by analyzer. Results may be affected.        Chloride 105 mmol/L      CO2 25.5 mmol/L      Calcium 9.2 mg/dL      Total Protein 7.2 g/dL      Albumin 4.3 g/dL      ALT (SGPT) 92 U/L      AST (SGOT) 43 U/L      Alkaline Phosphatase 75 U/L      Total Bilirubin 0.5 mg/dL      Globulin 2.9 gm/dL      A/G Ratio 1.5 g/dL      BUN/Creatinine Ratio 7.5     Anion Gap  11.5 mmol/L      eGFR 90.5 mL/min/1.73     Narrative:      GFR Normal >60  Chronic Kidney Disease <60  Kidney Failure <15      Lipase [[id number]]  (Abnormal) Collected: 01/17/23 2109    Specimen: Blood Updated: 01/17/23 2149     Lipase 94 U/L     BNP [[id number]]  (Normal) Collected: 01/17/23 2109    Specimen: Blood Updated: 01/17/23 2145     proBNP <36.0 pg/mL     Narrative:      Among patients with dyspnea, NT-proBNP is highly sensitive for the detection of acute congestive heart failure. In addition NT-proBNP of <300 pg/ml effectively rules out acute congestive heart failure with 99% negative predictive value.      Magnesium [[id number]]  (Normal) Collected: 01/17/23 2109    Specimen: Blood Updated: 01/17/23 2149     Magnesium 2.3 mg/dL     CBC Auto Differential [[id number]]  (Abnormal) Collected: 01/17/23 2109    Specimen: Blood Updated: 01/17/23 2127     WBC 6.42 10*3/mm3      RBC 5.15 10*6/mm3      Hemoglobin 16.7 g/dL      Hematocrit 46.3 %      MCV 89.9 fL      MCH 32.4 pg      MCHC 36.1 g/dL      RDW 13.1 %      RDW-SD 43.0 fl      MPV 9.0 fL      Platelets 193 10*3/mm3      Neutrophil % 55.9 %      Lymphocyte % 30.1 %      Monocyte % 8.3 %      Eosinophil % 3.6 %      Basophil % 1.6 %      Immature Grans % 0.5 %      Neutrophils, Absolute 3.60 10*3/mm3      Lymphocytes, Absolute 1.93 10*3/mm3      Monocytes, Absolute 0.53 10*3/mm3      Eosinophils, Absolute 0.23 10*3/mm3      Basophils, Absolute 0.10 10*3/mm3      Immature Grans, Absolute 0.03 10*3/mm3      nRBC 0.0 /100 WBC     Ethanol [[id number]]  (Abnormal) Collected: 01/17/23 2109    Specimen: Blood Updated: 01/18/23 0012     Ethanol 194 mg/dL      Ethanol % 0.194 %     Narrative:      Ethanol (Plasma)  <10 Essentially Negative    Toxic Concentrations           mg/dL    Flushing, slowing of reflexes    Impaired visual activity         Depression of CNS              >100  Possible Coma                  >300       POC Troponin I [[id number]]   (Normal) Collected: 01/17/23 2111    Specimen: Blood Updated: 01/17/23 2124     Troponin I 0.01 ng/mL      Comment: Serial Number: [id number]:  [id number]       POC Troponin I [[id number]]  (Normal) Collected: 01/17/23 2337    Specimen: Blood Updated: 01/17/23 2350     Troponin I 0.00 ng/mL      Comment: Serial Number: [id number]:  [id number]              Imaging:    XR Chest 1 View    Result Date: 1/17/2023  PROCEDURE: XR CHEST 1 VW  COMPARISON: 1/7/2023.  INDICATIONS: Chest pain.  Shortness of breath.  FINDINGS:  A single AP (or PA) upright portable chest radiograph was performed.  No cardiac enlargement is seen.  No acute infiltrate is appreciated.  No pleural effusion or pneumothorax is identified.  There are postoperative changes of the imaged spine.  External artifacts obscure detail.  No significant interval change is seen since the prior study (or studies).        No acute infiltrate is appreciated.     Please note that portions of this note were completed with a voice recognition program.  ASHLEY HONEYCUTT JR, MD       Electronically Signed and Approved By: ASHLEY HONEYCUTT JR, MD on 1/17/2023 at 22:41                  Differential Diagnosis and Discussion:    Chest Pain:  Based on the patient's signs and symptoms, I considered aortic dissection, myocardial infaction, pulmonary embolism, cardiac tamponade, pericarditis, pneumothorax, musculoskeletal chest pain and other differential diagnosis as an etiology of the patient's chest pain.   Syncope: Differential diagnosis includes but is not limited to TIA, hyperventilation, aortic stenosis, pulmonary emboli, myocardial disease, bradycardia arrhythmia, heart block, tachyarrhythmia, vasovagal, orthostatic hypotension, ruptured AAA, aortic dissection, subarachnoid hemorrhage, seizure, hypoglycemia.    All labs were reviewed and analyzed by me.  All X-rays were independently reviewed by me.  EKG was interpreted by me.    MDM         This patient is a pleasant  37-year-old male with history of hypertension and coronary artery disease who recently was started on lisinopril 20 mg for his blood pressure now presents with lightheadedness and syncopal episodes at home this week.    He was noted to have fairly low blood pressure roughly 104 over 60s and I consider he is probably being overmedicated at this point.    His EKG here showed no acute ischemia and chest x-ray was unremarkable and both sets of his troponins came back negative.    His chest pain had already resolved prior to arrival.    I will have him follow-up closely with his cardiologist Dr. Issa Tracy for further evaluation and for now we will decrease his lisinopril to just 10 mg daily to see if this stops his episodes of lightheadedness.    I gave him some IV fluids here and his blood pressure came up appropriately and I think he can be safely discharged home to follow-up with his cardiologist.              Patient Care Considerations:    CONSULT: I considered consulting His cardiologist, however It is in the middle of the night currently in the patient's symptoms have resolved and he looks stable for outpatient follow-up now.      Consultants/Shared Management Plan:        Social Determinants of Health:    Patient is independent, reliable, and has access to care.       Disposition and Care Coordination:    Discharged: I considered escalation of care by admitting this patient to the hospital, however the patient has improved and is suitable and stable for discharge.    I have explained the patient´s condition, diagnoses and treatment plan based on the information available to me at this time. I have answered questions and addressed any concerns. The patient has a good  understanding of the patient´s diagnosis, condition, and treatment plan as can be expected at this point. The vital signs have been stable. The patient´s condition is stable and appropriate for discharge from the emergency department.      The  patient will pursue further outpatient evaluation with the primary care physician or other designated or consulting physician as outlined in the discharge instructions. They are agreeable to this plan of care and follow-up instructions have been explained in detail. The patient has received these instructions in written format and have expressed an understanding of the discharge instructions. The patient is aware that any significant change in condition or worsening of symptoms should prompt an immediate return to this or the closest emergency department or call to 911.    Final diagnoses:   Chest pain, unspecified type   Syncope and collapse   Hypotension due to medication        ED Disposition     ED Disposition   Discharge    Condition   Stable    Comment   --             This medical record created using voice recognition software.           Bakari Swanson MD  01/18/23 4516

## 2023-01-18 NOTE — ED NOTES
Patient arrived via [facility] for HTN.  PCP started him on lisinopril 20 mg recently but he is having syncopal episodes and chest pain.  Patient received nitro x1 and 325 mg ASA en route for CP and it did relieve pain. Complains of SOA as well.

## 2023-01-30 ENCOUNTER — APPOINTMENT (OUTPATIENT)
Dept: GENERAL RADIOLOGY | Facility: HOSPITAL | Age: 38
End: 2023-01-30
Payer: COMMERCIAL

## 2023-01-30 ENCOUNTER — HOSPITAL ENCOUNTER (EMERGENCY)
Facility: HOSPITAL | Age: 38
Discharge: HOME | End: 2023-01-31
Attending: EMERGENCY MEDICINE | Admitting: EMERGENCY MEDICINE
Payer: COMMERCIAL

## 2023-01-30 DIAGNOSIS — F10.920 ALCOHOLIC INTOXICATION WITHOUT COMPLICATION: ICD-10-CM

## 2023-01-30 DIAGNOSIS — R55 SYNCOPE, UNSPECIFIED SYNCOPE TYPE: ICD-10-CM

## 2023-01-30 DIAGNOSIS — R07.89 CHEST DISCOMFORT: Primary | ICD-10-CM

## 2023-01-30 LAB
ALBUMIN SERPL-MCNC: 4.3 G/DL (ref 3.5–5.2)
ALBUMIN/GLOB SERPL: 1.5 G/DL
ALP SERPL-CCNC: 78 U/L (ref 39–117)
ALT SERPL W P-5'-P-CCNC: 47 U/L (ref 1–41)
ANION GAP SERPL CALCULATED.3IONS-SCNC: 12 MMOL/L (ref 5–15)
AST SERPL-CCNC: 36 U/L (ref 1–40)
BASOPHILS # BLD AUTO: 0.12 10*3/MM3 (ref 0–0.2)
BASOPHILS NFR BLD AUTO: 1.8 % (ref 0–1.5)
BILIRUB SERPL-MCNC: 0.5 MG/DL (ref 0–1.2)
BUN SERPL-MCNC: 6 MG/DL (ref 6–20)
BUN/CREAT SERPL: 6.1 (ref 7–25)
CALCIUM SPEC-SCNC: 9.1 MG/DL (ref 8.6–10.5)
CHLORIDE SERPL-SCNC: 103 MMOL/L (ref 98–107)
CO2 SERPL-SCNC: 27 MMOL/L (ref 22–29)
CREAT SERPL-MCNC: 0.98 MG/DL (ref 0.76–1.27)
DEPRECATED RDW RBC AUTO: 42.5 FL (ref 37–54)
EGFRCR SERPLBLD CKD-EPI 2021: 100.6 ML/MIN/1.73
EOSINOPHIL # BLD AUTO: 0.42 10*3/MM3 (ref 0–0.4)
EOSINOPHIL NFR BLD AUTO: 6.2 % (ref 0.3–6.2)
ERYTHROCYTE [DISTWIDTH] IN BLOOD BY AUTOMATED COUNT: 13.2 % (ref 12.3–15.4)
GLOBULIN UR ELPH-MCNC: 2.8 GM/DL
GLUCOSE SERPL-MCNC: 109 MG/DL (ref 65–99)
HCT VFR BLD AUTO: 47.3 % (ref 37.5–51)
HGB BLD-MCNC: 16.7 G/DL (ref 13–17.7)
HOLD SPECIMEN: NORMAL
IMM GRANULOCYTES # BLD AUTO: 0.02 10*3/MM3 (ref 0–0.05)
IMM GRANULOCYTES NFR BLD AUTO: 0.3 % (ref 0–0.5)
LIPASE SERPL-CCNC: 71 U/L (ref 13–60)
LYMPHOCYTES # BLD AUTO: 2.64 10*3/MM3 (ref 0.7–3.1)
LYMPHOCYTES NFR BLD AUTO: 38.7 % (ref 19.6–45.3)
MAGNESIUM SERPL-MCNC: 2.2 MG/DL (ref 1.6–2.6)
MCH RBC QN AUTO: 31.7 PG (ref 26.6–33)
MCHC RBC AUTO-ENTMCNC: 35.3 G/DL (ref 31.5–35.7)
MCV RBC AUTO: 89.8 FL (ref 79–97)
MONOCYTES # BLD AUTO: 0.52 10*3/MM3 (ref 0.1–0.9)
MONOCYTES NFR BLD AUTO: 7.6 % (ref 5–12)
NEUTROPHILS NFR BLD AUTO: 3.1 10*3/MM3 (ref 1.7–7)
NEUTROPHILS NFR BLD AUTO: 45.4 % (ref 42.7–76)
NRBC BLD AUTO-RTO: 0 /100 WBC (ref 0–0.2)
NT-PROBNP SERPL-MCNC: <36 PG/ML (ref 0–450)
PLATELET # BLD AUTO: 213 10*3/MM3 (ref 140–450)
PMV BLD AUTO: 9.2 FL (ref 6–12)
POTASSIUM SERPL-SCNC: 3.9 MMOL/L (ref 3.5–5.2)
PROT SERPL-MCNC: 7.1 G/DL (ref 6–8.5)
RBC # BLD AUTO: 5.27 10*6/MM3 (ref 4.14–5.8)
SODIUM SERPL-SCNC: 142 MMOL/L (ref 136–145)
TROPONIN I SERPL-MCNC: 0 NG/ML (ref 0–0.08)
WBC NRBC COR # BLD: 6.82 10*3/MM3 (ref 3.4–10.8)
WHOLE BLOOD HOLD COAG: NORMAL
WHOLE BLOOD HOLD SPECIMEN: NORMAL

## 2023-01-30 PROCEDURE — 80053 COMPREHEN METABOLIC PANEL: CPT | Performed by: EMERGENCY MEDICINE

## 2023-01-30 PROCEDURE — 84484 ASSAY OF TROPONIN QUANT: CPT

## 2023-01-30 PROCEDURE — 71045 X-RAY EXAM CHEST 1 VIEW: CPT

## 2023-01-30 PROCEDURE — 99284 EMERGENCY DEPT VISIT MOD MDM: CPT

## 2023-01-30 PROCEDURE — 85025 COMPLETE CBC W/AUTO DIFF WBC: CPT | Performed by: EMERGENCY MEDICINE

## 2023-01-30 PROCEDURE — 83880 ASSAY OF NATRIURETIC PEPTIDE: CPT | Performed by: EMERGENCY MEDICINE

## 2023-01-30 PROCEDURE — 83690 ASSAY OF LIPASE: CPT | Performed by: EMERGENCY MEDICINE

## 2023-01-30 PROCEDURE — 93005 ELECTROCARDIOGRAM TRACING: CPT

## 2023-01-30 PROCEDURE — 36415 COLL VENOUS BLD VENIPUNCTURE: CPT

## 2023-01-30 PROCEDURE — 83735 ASSAY OF MAGNESIUM: CPT | Performed by: EMERGENCY MEDICINE

## 2023-01-30 PROCEDURE — 82077 ASSAY SPEC XCP UR&BREATH IA: CPT | Performed by: EMERGENCY MEDICINE

## 2023-01-30 PROCEDURE — 93010 ELECTROCARDIOGRAM REPORT: CPT | Performed by: INTERNAL MEDICINE

## 2023-01-30 PROCEDURE — 93005 ELECTROCARDIOGRAM TRACING: CPT | Performed by: EMERGENCY MEDICINE

## 2023-01-30 PROCEDURE — 85379 FIBRIN DEGRADATION QUANT: CPT | Performed by: EMERGENCY MEDICINE

## 2023-01-30 RX ORDER — SODIUM CHLORIDE 0.9 % (FLUSH) 0.9 %
10 SYRINGE (ML) INJECTION AS NEEDED
Status: DISCONTINUED | OUTPATIENT
Start: 2023-01-30 | End: 2023-01-31 | Stop reason: HOSPADM

## 2023-01-30 RX ORDER — ASPIRIN 81 MG/1
324 TABLET, CHEWABLE ORAL ONCE
Status: DISCONTINUED | OUTPATIENT
Start: 2023-01-30 | End: 2023-01-31 | Stop reason: HOSPADM

## 2023-01-31 VITALS
DIASTOLIC BLOOD PRESSURE: 70 MMHG | RESPIRATION RATE: 16 BRPM | BODY MASS INDEX: 40.16 KG/M2 | OXYGEN SATURATION: 92 % | TEMPERATURE: 98.4 F | HEART RATE: 88 BPM | SYSTOLIC BLOOD PRESSURE: 110 MMHG | HEIGHT: 69 IN | WEIGHT: 271.17 LBS

## 2023-01-31 LAB
D DIMER PPP FEU-MCNC: <0.27 MCGFEU/ML (ref 0–0.5)
ETHANOL BLD-MCNC: 226 MG/DL (ref 0–10)
ETHANOL UR QL: 0.23 %
QT INTERVAL: 336 MS
QT INTERVAL: 357 MS
TROPONIN I SERPL-MCNC: 0 NG/ML (ref 0–0.08)

## 2023-01-31 PROCEDURE — 84484 ASSAY OF TROPONIN QUANT: CPT

## 2023-01-31 PROCEDURE — 93005 ELECTROCARDIOGRAM TRACING: CPT | Performed by: EMERGENCY MEDICINE

## 2023-01-31 PROCEDURE — 93010 ELECTROCARDIOGRAM REPORT: CPT | Performed by: INTERNAL MEDICINE

## 2023-01-31 RX ADMIN — SODIUM CHLORIDE 1000 ML: 9 INJECTION, SOLUTION INTRAVENOUS at 00:20

## 2023-06-02 ENCOUNTER — HOSPITAL ENCOUNTER (EMERGENCY)
Facility: HOSPITAL | Age: 38
Discharge: HOME | End: 2023-06-03
Attending: EMERGENCY MEDICINE
Payer: COMMERCIAL

## 2023-06-02 ENCOUNTER — APPOINTMENT (OUTPATIENT)
Dept: GENERAL RADIOLOGY | Facility: HOSPITAL | Age: 38
End: 2023-06-02
Payer: COMMERCIAL

## 2023-06-02 DIAGNOSIS — R07.89 CHEST DISCOMFORT: Primary | ICD-10-CM

## 2023-06-02 DIAGNOSIS — F10.20 ALCOHOLISM: ICD-10-CM

## 2023-06-02 DIAGNOSIS — K80.20 CALCULUS OF GALLBLADDER WITHOUT CHOLECYSTITIS WITHOUT OBSTRUCTION: ICD-10-CM

## 2023-06-02 LAB
ALBUMIN SERPL-MCNC: 4.2 G/DL (ref 3.5–5.2)
ALBUMIN/GLOB SERPL: 1.4 G/DL
ALP SERPL-CCNC: 77 U/L (ref 39–117)
ALT SERPL W P-5'-P-CCNC: 192 U/L (ref 1–41)
ANION GAP SERPL CALCULATED.3IONS-SCNC: 14.3 MMOL/L (ref 5–15)
AST SERPL-CCNC: 128 U/L (ref 1–40)
BASOPHILS # BLD AUTO: 0.15 10*3/MM3 (ref 0–0.2)
BASOPHILS NFR BLD AUTO: 1.9 % (ref 0–1.5)
BILIRUB SERPL-MCNC: 0.9 MG/DL (ref 0–1.2)
BUN SERPL-MCNC: 5 MG/DL (ref 6–20)
BUN/CREAT SERPL: 5.6 (ref 7–25)
CALCIUM SPEC-SCNC: 9.1 MG/DL (ref 8.6–10.5)
CHLORIDE SERPL-SCNC: 107 MMOL/L (ref 98–107)
CO2 SERPL-SCNC: 22.7 MMOL/L (ref 22–29)
CREAT SERPL-MCNC: 0.89 MG/DL (ref 0.76–1.27)
D-LACTATE SERPL-SCNC: 1.8 MMOL/L (ref 0.5–2)
DEPRECATED RDW RBC AUTO: 43 FL (ref 37–54)
EGFRCR SERPLBLD CKD-EPI 2021: 111.8 ML/MIN/1.73
EOSINOPHIL # BLD AUTO: 0.55 10*3/MM3 (ref 0–0.4)
EOSINOPHIL NFR BLD AUTO: 7 % (ref 0.3–6.2)
ERYTHROCYTE [DISTWIDTH] IN BLOOD BY AUTOMATED COUNT: 13.1 % (ref 12.3–15.4)
GLOBULIN UR ELPH-MCNC: 3 GM/DL
GLUCOSE SERPL-MCNC: 110 MG/DL (ref 65–99)
HCT VFR BLD AUTO: 46.2 % (ref 37.5–51)
HGB BLD-MCNC: 16.5 G/DL (ref 13–17.7)
IMM GRANULOCYTES # BLD AUTO: 0.02 10*3/MM3 (ref 0–0.05)
IMM GRANULOCYTES NFR BLD AUTO: 0.3 % (ref 0–0.5)
LIPASE SERPL-CCNC: 65 U/L (ref 13–60)
LYMPHOCYTES # BLD AUTO: 2.22 10*3/MM3 (ref 0.7–3.1)
LYMPHOCYTES NFR BLD AUTO: 28.1 % (ref 19.6–45.3)
MAGNESIUM SERPL-MCNC: 2.3 MG/DL (ref 1.6–2.6)
MCH RBC QN AUTO: 32 PG (ref 26.6–33)
MCHC RBC AUTO-ENTMCNC: 35.7 G/DL (ref 31.5–35.7)
MCV RBC AUTO: 89.7 FL (ref 79–97)
MONOCYTES # BLD AUTO: 0.62 10*3/MM3 (ref 0.1–0.9)
MONOCYTES NFR BLD AUTO: 7.9 % (ref 5–12)
NEUTROPHILS NFR BLD AUTO: 4.33 10*3/MM3 (ref 1.7–7)
NEUTROPHILS NFR BLD AUTO: 54.8 % (ref 42.7–76)
NRBC BLD AUTO-RTO: 0 /100 WBC (ref 0–0.2)
NT-PROBNP SERPL-MCNC: <36 PG/ML (ref 0–450)
PLATELET # BLD AUTO: 173 10*3/MM3 (ref 140–450)
PMV BLD AUTO: 9.3 FL (ref 6–12)
POTASSIUM SERPL-SCNC: 3.8 MMOL/L (ref 3.5–5.2)
PROT SERPL-MCNC: 7.2 G/DL (ref 6–8.5)
RBC # BLD AUTO: 5.15 10*6/MM3 (ref 4.14–5.8)
SODIUM SERPL-SCNC: 144 MMOL/L (ref 136–145)
TROPONIN T SERPL HS-MCNC: <6 NG/L
WBC NRBC COR # BLD: 7.89 10*3/MM3 (ref 3.4–10.8)
WHOLE BLOOD HOLD SPECIMEN: NORMAL

## 2023-06-02 PROCEDURE — 99284 EMERGENCY DEPT VISIT MOD MDM: CPT

## 2023-06-02 PROCEDURE — 93005 ELECTROCARDIOGRAM TRACING: CPT | Performed by: EMERGENCY MEDICINE

## 2023-06-02 PROCEDURE — 85025 COMPLETE CBC W/AUTO DIFF WBC: CPT | Performed by: EMERGENCY MEDICINE

## 2023-06-02 PROCEDURE — 83605 ASSAY OF LACTIC ACID: CPT | Performed by: EMERGENCY MEDICINE

## 2023-06-02 PROCEDURE — 71045 X-RAY EXAM CHEST 1 VIEW: CPT

## 2023-06-02 PROCEDURE — 80053 COMPREHEN METABOLIC PANEL: CPT | Performed by: EMERGENCY MEDICINE

## 2023-06-02 PROCEDURE — 83690 ASSAY OF LIPASE: CPT | Performed by: EMERGENCY MEDICINE

## 2023-06-02 PROCEDURE — 83880 ASSAY OF NATRIURETIC PEPTIDE: CPT | Performed by: EMERGENCY MEDICINE

## 2023-06-02 PROCEDURE — 83735 ASSAY OF MAGNESIUM: CPT | Performed by: EMERGENCY MEDICINE

## 2023-06-02 PROCEDURE — 84484 ASSAY OF TROPONIN QUANT: CPT | Performed by: EMERGENCY MEDICINE

## 2023-06-02 RX ORDER — SODIUM CHLORIDE 0.9 % (FLUSH) 0.9 %
10 SYRINGE (ML) INJECTION AS NEEDED
Status: DISCONTINUED | OUTPATIENT
Start: 2023-06-02 | End: 2023-06-03 | Stop reason: HOSPADM

## 2023-06-02 RX ORDER — ASPIRIN 81 MG/1
324 TABLET, CHEWABLE ORAL ONCE
Status: DISCONTINUED | OUTPATIENT
Start: 2023-06-02 | End: 2023-06-03 | Stop reason: HOSPADM

## 2023-06-03 ENCOUNTER — APPOINTMENT (OUTPATIENT)
Dept: ULTRASOUND IMAGING | Facility: HOSPITAL | Age: 38
End: 2023-06-03
Payer: COMMERCIAL

## 2023-06-03 VITALS
BODY MASS INDEX: 39.64 KG/M2 | WEIGHT: 276.9 LBS | HEIGHT: 70 IN | DIASTOLIC BLOOD PRESSURE: 76 MMHG | SYSTOLIC BLOOD PRESSURE: 108 MMHG | RESPIRATION RATE: 16 BRPM | HEART RATE: 93 BPM | TEMPERATURE: 98.4 F | OXYGEN SATURATION: 91 %

## 2023-06-03 LAB
AMPHET+METHAMPHET UR QL: NEGATIVE
BARBITURATES UR QL SCN: NEGATIVE
BENZODIAZ UR QL SCN: NEGATIVE
BILIRUB UR QL STRIP: NEGATIVE
CANNABINOIDS SERPL QL: NEGATIVE
CLARITY UR: CLEAR
COCAINE UR QL: NEGATIVE
COLOR UR: YELLOW
ETHANOL BLD-MCNC: 228 MG/DL (ref 0–10)
ETHANOL UR QL: 0.23 %
FENTANYL UR-MCNC: NEGATIVE NG/ML
GEN 5 2HR TROPONIN T REFLEX: <6 NG/L
GLUCOSE UR STRIP-MCNC: NEGATIVE MG/DL
HGB UR QL STRIP.AUTO: NEGATIVE
HOLD SPECIMEN: NORMAL
HOLD SPECIMEN: NORMAL
KETONES UR QL STRIP: NEGATIVE
LEUKOCYTE ESTERASE UR QL STRIP.AUTO: NEGATIVE
METHADONE UR QL SCN: NEGATIVE
NITRITE UR QL STRIP: NEGATIVE
OPIATES UR QL: NEGATIVE
OXYCODONE UR QL SCN: NEGATIVE
PH UR STRIP.AUTO: 5.5 [PH] (ref 5–8)
PROT UR QL STRIP: NEGATIVE
QT INTERVAL: 364 MS
QT INTERVAL: 376 MS
SP GR UR STRIP: <=1.005 (ref 1–1.03)
TROPONIN T DELTA: NORMAL
UROBILINOGEN UR QL STRIP: NORMAL
WHOLE BLOOD HOLD COAG: NORMAL

## 2023-06-03 PROCEDURE — 80307 DRUG TEST PRSMV CHEM ANLYZR: CPT | Performed by: EMERGENCY MEDICINE

## 2023-06-03 PROCEDURE — 93005 ELECTROCARDIOGRAM TRACING: CPT | Performed by: EMERGENCY MEDICINE

## 2023-06-03 PROCEDURE — 82077 ASSAY SPEC XCP UR&BREATH IA: CPT | Performed by: EMERGENCY MEDICINE

## 2023-06-03 PROCEDURE — 76705 ECHO EXAM OF ABDOMEN: CPT

## 2023-06-03 PROCEDURE — 84484 ASSAY OF TROPONIN QUANT: CPT | Performed by: EMERGENCY MEDICINE

## 2023-06-03 PROCEDURE — 93010 ELECTROCARDIOGRAM REPORT: CPT | Performed by: INTERNAL MEDICINE

## 2023-06-03 PROCEDURE — 81003 URINALYSIS AUTO W/O SCOPE: CPT | Performed by: EMERGENCY MEDICINE

## 2023-06-03 PROCEDURE — 36415 COLL VENOUS BLD VENIPUNCTURE: CPT

## 2023-06-03 PROCEDURE — 93005 ELECTROCARDIOGRAM TRACING: CPT

## 2023-06-03 RX ORDER — PANTOPRAZOLE SODIUM 40 MG/1
1 TABLET, DELAYED RELEASE ORAL DAILY
COMMUNITY
Start: 2023-04-17

## 2023-06-03 NOTE — ED PROVIDER NOTES
"Time: 12:40 AM EDT  Date of encounter:  6/2/2023  Independent Historian/Clinical History and Information was obtained by:   Patient, Family and EMS  Chief Complaint: Syncopal episodes and chest pain    History is limited by: {Limited History:89753}    History of Present Illness:  Patient is a 37 y.o. year old male who presents to the emergency department for evaluation of Syncopal episode tonight, also episode yesterday. Pt reports chest pain x 3 days, middle to lower anterior chest that feels like burning. Mild shortness of breath, nausea.     HPI    Patient Care Team  Primary Care Provider: Dalila Moon APRN    Past Medical History:     Allergies   Allergen Reactions   • Benadryl [Diphenhydramine] Hallucinations     Past Medical History:   Diagnosis Date   • Hernia of abdominal wall    • Hypertension    • MI (myocardial infarction)      Past Surgical History:   Procedure Laterality Date   • APPENDECTOMY       History reviewed. No pertinent family history.    Home Medications:  Prior to Admission medications    Medication Sig Start Date End Date Taking? Authorizing Provider   albuterol sulfate  (90 Base) MCG/ACT inhaler Inhale 2 puffs Every 4 (Four) Hours As Needed for Wheezing. 1/7/23   Lm Ruth MD   pantoprazole (PROTONIX) 40 MG EC tablet Take 1 tablet by mouth Daily. 4/17/23   Provider, MD Tamiko   predniSONE (DELTASONE) 50 MG tablet Take 1 tablet by mouth Daily. 1/7/23   Lm Ruth MD        Social History:   Social History     Tobacco Use   • Smoking status: Never   • Smokeless tobacco: Current     Types: Snuff   Vaping Use   • Vaping Use: Never used   Substance Use Topics   • Alcohol use: Yes     Alcohol/week: 14.0 standard drinks     Types: 14 Cans of beer per week   • Drug use: Never         Review of Systems:  Review of Systems     Physical Exam:  /94   Pulse 94   Temp 98.4 °F (36.9 °C) (Oral)   Resp 16   Ht 177.8 cm (70\")   Wt 126 kg (276 lb 14.4 oz)   SpO2 94%   " BMI 39.73 kg/m²     Physical Exam             Procedures:  Procedures      Medical Decision Making:      Comorbidities that affect care:    {Comorbidities that affect care:79838}    External Notes reviewed:    {External Note review (Optional):51193}      The following orders were placed and all results were independently analyzed by me:  Orders Placed This Encounter   Procedures   • XR Chest 1 View   • [place] Draw   • High Sensitivity Troponin T   • Comprehensive Metabolic Panel   • Lipase   • BNP   • Magnesium   • CBC Auto Differential   • Lactic Acid, Plasma   • High Sensitivity Troponin T 2Hr   • Urinalysis With Microscopic If Indicated (No Culture) - Urine, Clean Catch   • NPO Diet NPO Type: Strict NPO   • Undress & Gown   • Continuous Pulse Oximetry   • Oxygen Therapy- Nasal Cannula; Titrate 1-6 LPM Per SpO2; 90 - 95%   • ECG 12 Lead ED Triage Standing Order; Chest Pain   • ECG 12 Lead ED Triage Standing Order; Chest Pain   • Insert Peripheral IV   • CBC & Differential   • Green Top (Gel)   • Lavender Top   • Gold Top - SST   • Light Blue Top       Medications Given in the Emergency Department:  Medications   sodium chloride 0.9 % flush 10 mL (has no administration in time range)   aspirin chewable tablet 324 mg (324 mg Oral Not Given 6/2/23 2323)        ED Course:         Labs:    Lab Results (last 24 hours)     Procedure Component Value Units Date/Time    High Sensitivity Troponin T [[id number]]  (Normal) Collected: 06/02/23 2322    Specimen: Blood Updated: 06/02/23 2354     HS Troponin T <6 ng/L     Narrative:      High Sensitive Troponin T Reference Range:  <10.0 ng/L- Negative Female for AMI  <15.0 ng/L- Negative Male for AMI  >=10 - Abnormal Female indicating possible myocardial injury.  >=15 - Abnormal Male indicating possible myocardial injury.   Clinicians would have to utilize clinical acumen, EKG, Troponin, and serial changes to determine if it is an Acute Myocardial Infarction or myocardial injury  due to an underlying chronic condition.         CBC & Differential [[id number]]  (Abnormal) Collected: 06/02/23 2322    Specimen: Blood Updated: 06/02/23 2326    Narrative:      The following orders were created for panel order CBC & Differential.  Procedure                               Abnormality         Status                     ---------                               -----------         ------                     CBC Auto Differential[[id number]]        Abnormal            Final result                 Please view results for these tests on the individual orders.    Comprehensive Metabolic Panel [[id number]]  (Abnormal) Collected: 06/02/23 2322    Specimen: Blood Updated: 06/02/23 2354     Glucose 110 mg/dL      BUN 5 mg/dL      Creatinine 0.89 mg/dL      Sodium 144 mmol/L      Potassium 3.8 mmol/L      Chloride 107 mmol/L      CO2 22.7 mmol/L      Calcium 9.1 mg/dL      Total Protein 7.2 g/dL      Albumin 4.2 g/dL      ALT (SGPT) 192 U/L      AST (SGOT) 128 U/L      Alkaline Phosphatase 77 U/L      Total Bilirubin 0.9 mg/dL      Globulin 3.0 gm/dL      A/G Ratio 1.4 g/dL      BUN/Creatinine Ratio 5.6     Anion Gap 14.3 mmol/L      eGFR 111.8 mL/min/1.73     Narrative:      GFR Normal >60  Chronic Kidney Disease <60  Kidney Failure <15      Lipase [[id number]]  (Abnormal) Collected: 06/02/23 2322    Specimen: Blood Updated: 06/02/23 2354     Lipase 65 U/L     BNP [[id number]]  (Normal) Collected: 06/02/23 2322    Specimen: Blood Updated: 06/02/23 2351     proBNP <36.0 pg/mL     Narrative:      Among patients with dyspnea, NT-proBNP is highly sensitive for the detection of acute congestive heart failure. In addition NT-proBNP of <300 pg/ml effectively rules out acute congestive heart failure with 99% negative predictive value.      Magnesium [[id number]]  (Normal) Collected: 06/02/23 2322    Specimen: Blood Updated: 06/02/23 2354     Magnesium 2.3 mg/dL     CBC Auto Differential [[id number]]  (Abnormal) Collected:  06/02/23 2322    Specimen: Blood Updated: 06/02/23 2326     WBC 7.89 10*3/mm3      RBC 5.15 10*6/mm3      Hemoglobin 16.5 g/dL      Hematocrit 46.2 %      MCV 89.7 fL      MCH 32.0 pg      MCHC 35.7 g/dL      RDW 13.1 %      RDW-SD 43.0 fl      MPV 9.3 fL      Platelets 173 10*3/mm3      Neutrophil % 54.8 %      Lymphocyte % 28.1 %      Monocyte % 7.9 %      Eosinophil % 7.0 %      Basophil % 1.9 %      Immature Grans % 0.3 %      Neutrophils, Absolute 4.33 10*3/mm3      Lymphocytes, Absolute 2.22 10*3/mm3      Monocytes, Absolute 0.62 10*3/mm3      Eosinophils, Absolute 0.55 10*3/mm3      Basophils, Absolute 0.15 10*3/mm3      Immature Grans, Absolute 0.02 10*3/mm3      nRBC 0.0 /100 WBC     Lactic Acid, Plasma [[id number]]  (Normal) Collected: 06/02/23 2322    Specimen: Blood Updated: 06/02/23 2353     Lactate 1.8 mmol/L            Imaging:    No Radiology Exams Resulted Within Past 24 Hours      Differential Diagnosis and Discussion:    {Differentials:44256}    {Independent Review of (Optional):58198}    MDM     {Critical Care:48171}    Patient Care Considerations:    {Considerations (Optional):71945}      Consultants/Shared Management Plan:    {Shared Management Plan (Optional):82552}    Social Determinants of Health:    {Social Determinants of Health (Optional):00893}      Disposition and Care Coordination:    {Admission consideration:76685}    {Discharge (Optional):21502}    Final diagnoses:   None        ED Disposition     None          This medical record created using voice recognition software.

## 2023-06-03 NOTE — DISCHARGE INSTRUCTIONS
Please discuss the need for stress echocardiogram with the cardiologist    Please stop abusing alcohol    No strenuous activity until released by the cardiologist.  Please start on a daily baby aspirin.  Please return to the emergency room for worsening chest pain, radiating chest pain, shortness of breath, near passing out, passing out, extreme fatigue, extreme sweating, nausea or vomiting or new or worrisome symptoms

## 2023-06-03 NOTE — ED PROVIDER NOTES
Time: 4:58 AM EDT  Date of encounter:  6/2/2023  Independent Historian/Clinical History and Information was obtained by:   Patient  Chief Complaint: Chest discomfort    History is limited by: N/A    History of Present Illness:  Patient is a 37 y.o. year old male who presents to the emergency department for evaluation of chest discomfort.  Patient states that he has had chest discomfort for 3 days.  He states its intermittent.  He states is located in the middle of his chest.  He states periodically it will radiate to his neck.  There is no radiation to the jaw back or arms.  He does have associated nausea.  The patient denies any diaphoresis or shortness of breath.  Patient states that he did have a syncopal episode yesterday and today.  However, the patient states that he abuses alcohol and states that it could be related to his drinking.  Patient denies any abdominal pain..  The patient's had no fever, rigors or myalgias.  The patient has no previous history of DVT or pulmonary embolism.  The patient has no unilateral leg swelling or leg pain.  The patient does not have cancer.  The does not have any recent immobilization, trauma or surgery..  The patient does state he has hypertension.  The patient does not have high cholesterol.  The patient states he does not have diabetes.  The patient does not smoke.  The patient does have a history of coronary disease    HPI    Patient Care Team  Primary Care Provider: Dalila Moon APRN    Past Medical History:     Allergies   Allergen Reactions    Benadryl [Diphenhydramine] Hallucinations     Past Medical History:   Diagnosis Date    Hernia of abdominal wall     Hypertension     MI (myocardial infarction)      Past Surgical History:   Procedure Laterality Date    APPENDECTOMY       History reviewed. No pertinent family history.    Home Medications:  Prior to Admission medications    Medication Sig Start Date End Date Taking? Authorizing Provider   albuterol sulfate  " (90 Base) MCG/ACT inhaler Inhale 2 puffs Every 4 (Four) Hours As Needed for Wheezing. 1/7/23   Lm Ruth MD   pantoprazole (PROTONIX) 40 MG EC tablet Take 1 tablet by mouth Daily. 4/17/23   Provider, MD Tamiko   predniSONE (DELTASONE) 50 MG tablet Take 1 tablet by mouth Daily. 1/7/23   Lm Ruth MD        Social History:   Social History     Tobacco Use    Smoking status: Never    Smokeless tobacco: Current     Types: Snuff   Vaping Use    Vaping Use: Never used   Substance Use Topics    Alcohol use: Yes     Alcohol/week: 14.0 standard drinks     Types: 14 Cans of beer per week    Drug use: Never         Review of Systems:  Review of Systems   Constitutional:  Negative for chills, diaphoresis and fever.   HENT:  Negative for congestion, postnasal drip, rhinorrhea and sore throat.    Eyes:  Negative for photophobia.   Respiratory:  Negative for cough, chest tightness and shortness of breath.    Cardiovascular:  Positive for chest pain. Negative for palpitations and leg swelling.   Gastrointestinal:  Positive for nausea. Negative for abdominal pain, diarrhea and vomiting.   Genitourinary:  Negative for difficulty urinating, dysuria, flank pain, frequency, hematuria and urgency.   Musculoskeletal:  Negative for neck pain and neck stiffness.   Skin:  Negative for pallor and rash.   Neurological:  Positive for syncope. Negative for dizziness, weakness, numbness and headaches.   Hematological:  Negative for adenopathy. Does not bruise/bleed easily.   Psychiatric/Behavioral: Negative.        Physical Exam:  /76   Pulse 93   Temp 98.4 °F (36.9 °C) (Oral)   Resp 16   Ht 177.8 cm (70\")   Wt 126 kg (276 lb 14.4 oz)   SpO2 91%   BMI 39.73 kg/m²     Physical Exam  Vitals and nursing note reviewed.   Constitutional:       General: He is not in acute distress.     Appearance: Normal appearance. He is not ill-appearing, toxic-appearing or diaphoretic.   HENT:      Head: Normocephalic and " atraumatic.      Mouth/Throat:      Mouth: Mucous membranes are moist.   Eyes:      Pupils: Pupils are equal, round, and reactive to light.   Cardiovascular:      Rate and Rhythm: Normal rate and regular rhythm.      Pulses: Normal pulses.           Carotid pulses are 2+ on the right side and 2+ on the left side.       Radial pulses are 2+ on the right side and 2+ on the left side.        Femoral pulses are 2+ on the right side and 2+ on the left side.       Popliteal pulses are 2+ on the right side and 2+ on the left side.        Dorsalis pedis pulses are 2+ on the right side and 2+ on the left side.        Posterior tibial pulses are 2+ on the right side and 2+ on the left side.      Heart sounds: Normal heart sounds. No murmur heard.  Pulmonary:      Effort: Pulmonary effort is normal. No accessory muscle usage, respiratory distress or retractions.      Breath sounds: Normal breath sounds. No decreased breath sounds, wheezing, rhonchi or rales.   Chest:      Chest wall: No mass or tenderness.   Abdominal:      General: Abdomen is flat. There is no distension.      Palpations: Abdomen is soft. There is no mass or pulsatile mass.      Tenderness: There is no abdominal tenderness. There is no right CVA tenderness, left CVA tenderness, guarding or rebound.      Comments: No rigidity    Patient has large surgical scars.    The patient has large incisional hernias that are reducible and nontender   Musculoskeletal:         General: No swelling, tenderness or deformity.      Cervical back: Neck supple. No tenderness.      Right lower leg: No edema.      Left lower leg: No edema.   Skin:     General: Skin is warm and dry.      Capillary Refill: Capillary refill takes less than 2 seconds.      Coloration: Skin is not jaundiced or pale.      Findings: No erythema.   Neurological:      General: No focal deficit present.      Mental Status: He is alert and oriented to person, place, and time. Mental status is at baseline.       Cranial Nerves: Cranial nerves 2-12 are intact. No cranial nerve deficit.      Sensory: Sensation is intact. No sensory deficit.      Motor: Motor function is intact. No weakness or pronator drift.      Coordination: Coordination is intact. Coordination normal.   Psychiatric:         Mood and Affect: Mood normal.         Behavior: Behavior normal.                Procedures:  Procedures      Medical Decision Making:      Comorbidities that affect care:    Hypertension, alcoholism    External Notes reviewed:    None      The following orders were placed and all results were independently analyzed by me:  Orders Placed This Encounter   Procedures    XR Chest 1 View    US Gallbladder    [place] Draw    High Sensitivity Troponin T    Comprehensive Metabolic Panel    Lipase    BNP    Magnesium    CBC Auto Differential    Lactic Acid, Plasma    High Sensitivity Troponin T 2Hr    Urinalysis With Microscopic If Indicated (No Culture) - Urine, Clean Catch    Urine Drug Screen - Urine, Clean Catch    Ethanol    NPO Diet NPO Type: Strict NPO    Undress & Gown    Continuous Pulse Oximetry    Oxygen Therapy- Nasal Cannula; Titrate 1-6 LPM Per SpO2; 90 - 95%    ECG 12 Lead ED Triage Standing Order; Chest Pain    ECG 12 Lead ED Triage Standing Order; Chest Pain    Insert Peripheral IV    CBC & Differential    Green Top (Gel)    Lavender Top    Gold Top - SST    Light Blue Top       Medications Given in the Emergency Department:  Medications   sodium chloride 0.9 % flush 10 mL (has no administration in time range)   aspirin chewable tablet 324 mg (324 mg Oral Not Given 6/2/23 8283)        ED Course:    ED Course as of 06/03/23 0510   Sat Jun 03, 2023   0024 EKG:    Rhythm: Sinus rhythm  Rate: 88  Intervals: Normal MA and QT interval  T-wave: Nonspecific T wave flattening, isolated nonspecific T wave version III  ST Segment: No obvious pathological ST elevation or reciprocal ST depression to suggest acute myocardial  infarction    EKG Comparison: EKG is unchanged from EKG performed January 31, 2023    Interpreted by me   [SD]   0136 EKG:    Rhythm: Normal sinus rhythm  Rate: 89  Intervals: Normal HI and QT interval  T-wave: Non specific T wave flattening, isolated nonspecific T wave inversion III  ST Segment: No pathological ST elevation or reciprocal ST depression to suggest acute myocardial infarction    EKG Comparison: There is no change in the QRS and ST morphology from the EKG it was performed earlier in the department or the EKG was performed in January    Interpreted by me   [SD]      ED Course User Index  [SD] Sushant Gusman DO       Labs:    Lab Results (last 24 hours)       Procedure Component Value Units Date/Time    High Sensitivity Troponin T [[id number]]  (Normal) Collected: 06/02/23 2322    Specimen: Blood Updated: 06/02/23 2354     HS Troponin T <6 ng/L     Narrative:      High Sensitive Troponin T Reference Range:  <10.0 ng/L- Negative Female for AMI  <15.0 ng/L- Negative Male for AMI  >=10 - Abnormal Female indicating possible myocardial injury.  >=15 - Abnormal Male indicating possible myocardial injury.   Clinicians would have to utilize clinical acumen, EKG, Troponin, and serial changes to determine if it is an Acute Myocardial Infarction or myocardial injury due to an underlying chronic condition.         CBC & Differential [[id number]]  (Abnormal) Collected: 06/02/23 2322    Specimen: Blood Updated: 06/02/23 2326    Narrative:      The following orders were created for panel order CBC & Differential.  Procedure                               Abnormality         Status                     ---------                               -----------         ------                     CBC Auto Differential[[id number]]        Abnormal            Final result                 Please view results for these tests on the individual orders.    Comprehensive Metabolic Panel [[id number]]  (Abnormal) Collected: 06/02/23 2322     Specimen: Blood Updated: 06/02/23 2354     Glucose 110 mg/dL      BUN 5 mg/dL      Creatinine 0.89 mg/dL      Sodium 144 mmol/L      Potassium 3.8 mmol/L      Chloride 107 mmol/L      CO2 22.7 mmol/L      Calcium 9.1 mg/dL      Total Protein 7.2 g/dL      Albumin 4.2 g/dL      ALT (SGPT) 192 U/L      AST (SGOT) 128 U/L      Alkaline Phosphatase 77 U/L      Total Bilirubin 0.9 mg/dL      Globulin 3.0 gm/dL      A/G Ratio 1.4 g/dL      BUN/Creatinine Ratio 5.6     Anion Gap 14.3 mmol/L      eGFR 111.8 mL/min/1.73     Narrative:      GFR Normal >60  Chronic Kidney Disease <60  Kidney Failure <15      Lipase [[id number]]  (Abnormal) Collected: 06/02/23 2322    Specimen: Blood Updated: 06/02/23 2354     Lipase 65 U/L     BNP [[id number]]  (Normal) Collected: 06/02/23 2322    Specimen: Blood Updated: 06/02/23 2351     proBNP <36.0 pg/mL     Narrative:      Among patients with dyspnea, NT-proBNP is highly sensitive for the detection of acute congestive heart failure. In addition NT-proBNP of <300 pg/ml effectively rules out acute congestive heart failure with 99% negative predictive value.      Magnesium [[id number]]  (Normal) Collected: 06/02/23 2322    Specimen: Blood Updated: 06/02/23 2354     Magnesium 2.3 mg/dL     CBC Auto Differential [[id number]]  (Abnormal) Collected: 06/02/23 2322    Specimen: Blood Updated: 06/02/23 2326     WBC 7.89 10*3/mm3      RBC 5.15 10*6/mm3      Hemoglobin 16.5 g/dL      Hematocrit 46.2 %      MCV 89.7 fL      MCH 32.0 pg      MCHC 35.7 g/dL      RDW 13.1 %      RDW-SD 43.0 fl      MPV 9.3 fL      Platelets 173 10*3/mm3      Neutrophil % 54.8 %      Lymphocyte % 28.1 %      Monocyte % 7.9 %      Eosinophil % 7.0 %      Basophil % 1.9 %      Immature Grans % 0.3 %      Neutrophils, Absolute 4.33 10*3/mm3      Lymphocytes, Absolute 2.22 10*3/mm3      Monocytes, Absolute 0.62 10*3/mm3      Eosinophils, Absolute 0.55 10*3/mm3      Basophils, Absolute 0.15 10*3/mm3      Immature Grans, Absolute  0.02 10*3/mm3      nRBC 0.0 /100 WBC     Lactic Acid, Plasma [[id number]]  (Normal) Collected: 06/02/23 2322    Specimen: Blood Updated: 06/02/23 2353     Lactate 1.8 mmol/L     Urinalysis With Microscopic If Indicated (No Culture) - Urine, Clean Catch [[id number]]  (Normal) Collected: 06/03/23 0029    Specimen: Urine, Clean Catch Updated: 06/03/23 0051     Color, UA Yellow     Appearance, UA Clear     pH, UA 5.5     Specific Gravity, UA <=1.005     Glucose, UA Negative     Ketones, UA Negative     Bilirubin, UA Negative     Blood, UA Negative     Protein, UA Negative     Leuk Esterase, UA Negative     Nitrite, UA Negative     Urobilinogen, UA 0.2 E.U./dL    Narrative:      Urine microscopic not indicated.    Urine Drug Screen - Urine, Clean Catch [[id number]] Collected: 06/03/23 0029    Specimen: Urine, Clean Catch Updated: 06/03/23 0454    High Sensitivity Troponin T 2Hr [[id number]] Collected: 06/03/23 0125    Specimen: Blood Updated: 06/03/23 0149     HS Troponin T <6 ng/L      Troponin T Delta --     Comment: Unable to calculate.       Narrative:      High Sensitive Troponin T Reference Range:  <10.0 ng/L- Negative Female for AMI  <15.0 ng/L- Negative Male for AMI  >=10 - Abnormal Female indicating possible myocardial injury.  >=15 - Abnormal Male indicating possible myocardial injury.   Clinicians would have to utilize clinical acumen, EKG, Troponin, and serial changes to determine if it is an Acute Myocardial Infarction or myocardial injury due to an underlying chronic condition.         Ethanol [[id number]] Collected: 06/03/23 0125    Specimen: Blood Updated: 06/03/23 0454             Imaging:    US Gallbladder    Result Date: 6/3/2023  PROCEDURE: US GALLBLADDER  COMPARISON: None.  INDICATIONS: RUQ abdominal pain; elevated liver enzymes  TECHNIQUE: A limited abdominal ultrasound examination of the right upper quadrant was performed, tailored in order to evaluate the gallbladder and the biliary tree.   FINDINGS:   Two-dimensional grayscale images as well as color and spectral Doppler analysis are provided for review.  The study is limited due to patient being unable to lie supine for the exam.  The patient was in pain and unable to cooperate.  There are suspected gallstones without gallbladder wall thickening or pericholecystic fluid.  Diffuse hepatic steatosis is seen without hepatomegaly.  No dilatation of the common bile duct is suspected.  The common bile duct measures about 3.3 mm in maximum diameter.  No choledocholithiasis is suggested by ultrasound examination.  The pancreas is not clearly visualized.  No right-sided hydronephrosis is seen.  Color and spectral Doppler interrogation of the hepatic vasculature reveals patent vessels with normal blood flow direction.  A negative sonographic Doshi's sign was reported.  No ascites is seen.  The qgdh-kj-[id number] length of the right kidney is 11.6 cm.  The maximum craniocaudal dimension of the right lobe of the liver is 15.7 cm.  The left kidney, spleen, inferior vena cava, and abdominal aorta were not evaluated.        Gallstones are suggested without acute cholecystitis by ultrasound examination.  No biliary ductal dilatation is seen.  There is diffuse hepatic steatosis.  The exam is limited.      Please note that portions of this note were completed with a voice recognition program.  ASHLEY HONEYCUTT JR, MD       Electronically Signed and Approved By: ASHLEY HONEYCUTT JR, MD on 6/03/2023 at 2:10              XR Chest 1 View    Result Date: 6/3/2023  PROCEDURE: XR CHEST 1 VW  COMPARISON: 1/30/2023.  INDICATIONS: Chest pain.  FINDINGS:  A single AP (or PA) upright portable chest radiograph was performed.  No cardiac enlargement is seen.  No acute infiltrate is appreciated.  No pleural effusion or pneumothorax is identified.  Chronic calcified granulomatous disease involves the chest.  External artifacts obscure detail.  Degenerative changes involve the imaged spine.  A small  portion of the left lateral/inferior costophrenic sulcus is excluded from the field of view.  Otherwise, no significant interval change is seen since the prior study (or studies).        No acute infiltrate is appreciated.    Please note that portions of this note were completed with a voice recognition program.  ASHLEY HONEYCUTT JR, MD       Electronically Signed and Approved By: ASHLEY HONEYCUTT JR, MD on 6/03/2023 at 1:57                 Differential Diagnosis and Discussion:    Chest Pain:  Based on the patient's signs and symptoms, I considered aortic dissection, myocardial infaction, pulmonary embolism, cardiac tamponade, pericarditis, pneumothorax, musculoskeletal chest pain and other differential diagnosis as an etiology of the patient's chest pain.     All labs were reviewed and interpreted by me.  All X-rays impressions were independently interpreted by me.  EKG was interpreted by me.    MDM  Number of Diagnoses or Management Options  Alcoholism  Calculus of gallbladder without cholecystitis without obstruction  Chest discomfort  Diagnosis management comments: PERC Rule for Pulmonary Embolism - MDCalc  Calculated on Jun 03 2023 5:08 AM  0 criteria -> No need for further workup, as <2% chance of PE. If no criteria are positive and clinician's pre-test probability is <15%, PERC Rule criteria are satisfied.    The patient's PERC score was negative.  I have discussed with the patient that they have a very low risk for pulmonary embolism.  I have discussed possibly performing a CAT scan of the chest with IV contrast to rule out pulmonary embolism.  However, due to the fact that the patient is very low risk for pulmonary embolism, they would prefer not to have a CAT scan of the chest at this time due to radiation exposure.    HEART Score for Major Cardiac Events - MDCalc  Calculated on Jun 03 2023 5:08 AM  2 points -> Low Score (0-3 points) Risk of MACE of 0.9-1.7%.    The patient had 2 high-sensitivity troponins  that were within normal limits.  The patient had 2 EKGs that demonstrated no evidence of ST segment elevation MI or other injury pattern    The patient appears well, in no distress and nontoxic.  The patient is resting comfortably.  The patient has a low heart score.  I have discussed the significance of the heart score with them.  The patient understands that they have a low risk for cardiovascular event over the next 6 weeks.  Understanding the risks, they feel comfortable to be discharged home and to follow-up with the cardiologist on an outpatient basis for stress test.   In the interim, the patient does understand should they develop worsening chest pain, near syncope, syncope, extreme fatigue, worsening shortness of breath or diaphoresis to return back to emergency room immediately.         Amount and/or Complexity of Data Reviewed  Clinical lab tests: reviewed  Tests in the radiology section of CPT®: reviewed  Tests in the medicine section of CPT®: reviewed               Social Determinants of Health:    Patient is independent, reliable, and has access to care.       Disposition and Care Coordination:    Discharged: The patient is suitable and stable for discharge with no need for consideration of observation or admission.    I have explained discharge medications and the need for follow up with the patient/caretakers. This was also printed in the discharge instructions. Patient was discharged with the following medications and follow up:      Medication List      No changes were made to your prescriptions during this visit.      Cece Chapman MD  [address] [facility] [place] [address]  [phone]    On 6/5/2023  Gallstones, call for appointment    Rosalio Couch MD  [address] [place] [address]  [phone]    On 6/5/2023  Chest discomfort, call for appointment, discuss need for stress echocardiogram       Final diagnoses:   Chest discomfort   Calculus of gallbladder without  cholecystitis without obstruction   Alcoholism        ED Disposition       ED Disposition   Discharge    Condition   Stable    Comment   --               This medical record created using voice recognition software.             Sushant Gusman DO  06/03/23 0597

## 2023-06-15 ENCOUNTER — HOSPITAL ENCOUNTER (EMERGENCY)
Facility: HOSPITAL | Age: 38
Discharge: LEFT BEFORE DISCHARGE | End: 2023-06-15
Attending: EMERGENCY MEDICINE
Payer: COMMERCIAL

## 2023-06-15 ENCOUNTER — APPOINTMENT (OUTPATIENT)
Dept: CT IMAGING | Facility: HOSPITAL | Age: 38
End: 2023-06-15
Payer: COMMERCIAL

## 2023-06-15 ENCOUNTER — APPOINTMENT (OUTPATIENT)
Dept: GENERAL RADIOLOGY | Facility: HOSPITAL | Age: 38
End: 2023-06-15
Payer: COMMERCIAL

## 2023-06-15 VITALS
RESPIRATION RATE: 16 BRPM | TEMPERATURE: 98.3 F | OXYGEN SATURATION: 90 % | DIASTOLIC BLOOD PRESSURE: 70 MMHG | SYSTOLIC BLOOD PRESSURE: 110 MMHG | HEART RATE: 99 BPM

## 2023-06-15 DIAGNOSIS — R07.9 CHEST PAIN, UNSPECIFIED TYPE: Primary | ICD-10-CM

## 2023-06-15 LAB
ALBUMIN SERPL-MCNC: 4.3 G/DL (ref 3.5–5.2)
ALBUMIN/GLOB SERPL: 1.5 G/DL
ALP SERPL-CCNC: 81 U/L (ref 39–117)
ALT SERPL W P-5'-P-CCNC: 248 U/L (ref 1–41)
ANION GAP SERPL CALCULATED.3IONS-SCNC: 15 MMOL/L (ref 5–15)
AST SERPL-CCNC: 239 U/L (ref 1–40)
BASOPHILS # BLD AUTO: 0.11 10*3/MM3 (ref 0–0.2)
BASOPHILS NFR BLD AUTO: 1.8 % (ref 0–1.5)
BILIRUB SERPL-MCNC: 0.8 MG/DL (ref 0–1.2)
BUN SERPL-MCNC: 5 MG/DL (ref 6–20)
BUN/CREAT SERPL: 5.6 (ref 7–25)
CALCIUM SPEC-SCNC: 9.1 MG/DL (ref 8.6–10.5)
CHLORIDE SERPL-SCNC: 104 MMOL/L (ref 98–107)
CO2 SERPL-SCNC: 21 MMOL/L (ref 22–29)
CREAT SERPL-MCNC: 0.89 MG/DL (ref 0.76–1.27)
DEPRECATED RDW RBC AUTO: 42.2 FL (ref 37–54)
EGFRCR SERPLBLD CKD-EPI 2021: 111.8 ML/MIN/1.73
EOSINOPHIL # BLD AUTO: 0.38 10*3/MM3 (ref 0–0.4)
EOSINOPHIL NFR BLD AUTO: 6.3 % (ref 0.3–6.2)
ERYTHROCYTE [DISTWIDTH] IN BLOOD BY AUTOMATED COUNT: 12.9 % (ref 12.3–15.4)
ETHANOL BLD-MCNC: 260 MG/DL (ref 0–10)
ETHANOL UR QL: 0.26 %
GLOBULIN UR ELPH-MCNC: 2.9 GM/DL
GLUCOSE SERPL-MCNC: 148 MG/DL (ref 65–99)
HCT VFR BLD AUTO: 48 % (ref 37.5–51)
HGB BLD-MCNC: 17.1 G/DL (ref 13–17.7)
HOLD SPECIMEN: NORMAL
HOLD SPECIMEN: NORMAL
IMM GRANULOCYTES # BLD AUTO: 0.02 10*3/MM3 (ref 0–0.05)
IMM GRANULOCYTES NFR BLD AUTO: 0.3 % (ref 0–0.5)
LIPASE SERPL-CCNC: 67 U/L (ref 13–60)
LYMPHOCYTES # BLD AUTO: 1.69 10*3/MM3 (ref 0.7–3.1)
LYMPHOCYTES NFR BLD AUTO: 28.2 % (ref 19.6–45.3)
MAGNESIUM SERPL-MCNC: 2.2 MG/DL (ref 1.6–2.6)
MCH RBC QN AUTO: 31.8 PG (ref 26.6–33)
MCHC RBC AUTO-ENTMCNC: 35.6 G/DL (ref 31.5–35.7)
MCV RBC AUTO: 89.4 FL (ref 79–97)
MONOCYTES # BLD AUTO: 0.5 10*3/MM3 (ref 0.1–0.9)
MONOCYTES NFR BLD AUTO: 8.3 % (ref 5–12)
NEUTROPHILS NFR BLD AUTO: 3.3 10*3/MM3 (ref 1.7–7)
NEUTROPHILS NFR BLD AUTO: 55.1 % (ref 42.7–76)
NRBC BLD AUTO-RTO: 0 /100 WBC (ref 0–0.2)
NT-PROBNP SERPL-MCNC: <36 PG/ML (ref 0–450)
PLATELET # BLD AUTO: 150 10*3/MM3 (ref 140–450)
PMV BLD AUTO: 9.2 FL (ref 6–12)
POTASSIUM SERPL-SCNC: 3.7 MMOL/L (ref 3.5–5.2)
PROT SERPL-MCNC: 7.2 G/DL (ref 6–8.5)
RBC # BLD AUTO: 5.37 10*6/MM3 (ref 4.14–5.8)
SODIUM SERPL-SCNC: 140 MMOL/L (ref 136–145)
TROPONIN T SERPL HS-MCNC: <6 NG/L
WBC NRBC COR # BLD: 6 10*3/MM3 (ref 3.4–10.8)
WHOLE BLOOD HOLD COAG: NORMAL
WHOLE BLOOD HOLD SPECIMEN: NORMAL

## 2023-06-15 PROCEDURE — 83880 ASSAY OF NATRIURETIC PEPTIDE: CPT | Performed by: EMERGENCY MEDICINE

## 2023-06-15 PROCEDURE — 82077 ASSAY SPEC XCP UR&BREATH IA: CPT | Performed by: EMERGENCY MEDICINE

## 2023-06-15 PROCEDURE — 83735 ASSAY OF MAGNESIUM: CPT | Performed by: EMERGENCY MEDICINE

## 2023-06-15 PROCEDURE — 83690 ASSAY OF LIPASE: CPT | Performed by: EMERGENCY MEDICINE

## 2023-06-15 PROCEDURE — 85025 COMPLETE CBC W/AUTO DIFF WBC: CPT | Performed by: EMERGENCY MEDICINE

## 2023-06-15 PROCEDURE — 93005 ELECTROCARDIOGRAM TRACING: CPT | Performed by: EMERGENCY MEDICINE

## 2023-06-15 PROCEDURE — 80053 COMPREHEN METABOLIC PANEL: CPT | Performed by: EMERGENCY MEDICINE

## 2023-06-15 PROCEDURE — 84484 ASSAY OF TROPONIN QUANT: CPT | Performed by: EMERGENCY MEDICINE

## 2023-06-15 PROCEDURE — 71045 X-RAY EXAM CHEST 1 VIEW: CPT

## 2023-06-15 RX ORDER — ASPIRIN 81 MG/1
324 TABLET, CHEWABLE ORAL ONCE
Status: DISCONTINUED | OUTPATIENT
Start: 2023-06-15 | End: 2023-06-16 | Stop reason: HOSPADM

## 2023-06-15 RX ORDER — SODIUM CHLORIDE 0.9 % (FLUSH) 0.9 %
10 SYRINGE (ML) INJECTION AS NEEDED
Status: DISCONTINUED | OUTPATIENT
Start: 2023-06-15 | End: 2023-06-16 | Stop reason: HOSPADM

## 2023-06-15 RX ADMIN — SODIUM CHLORIDE 1000 ML: 9 INJECTION, SOLUTION INTRAVENOUS at 22:54

## 2023-06-16 LAB — QT INTERVAL: 341 MS
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
## 2023-08-30 ENCOUNTER — HOSPITAL ENCOUNTER (EMERGENCY)
Facility: HOSPITAL | Age: 38
Discharge: HOME | End: 2023-08-31
Attending: EMERGENCY MEDICINE
Payer: COMMERCIAL

## 2023-08-30 VITALS
WEIGHT: 283.7 LBS | RESPIRATION RATE: 18 BRPM | BODY MASS INDEX: 43 KG/M2 | OXYGEN SATURATION: 92 % | HEIGHT: 68 IN | DIASTOLIC BLOOD PRESSURE: 92 MMHG | TEMPERATURE: 98.6 F | SYSTOLIC BLOOD PRESSURE: 126 MMHG | HEART RATE: 99 BPM

## 2023-08-30 DIAGNOSIS — T67.9XXA HEAT EXPOSURE, INITIAL ENCOUNTER: Primary | ICD-10-CM

## 2023-08-30 DIAGNOSIS — R55 SYNCOPE, UNSPECIFIED SYNCOPE TYPE: ICD-10-CM

## 2023-08-30 LAB
ALBUMIN SERPL-MCNC: 4.2 G/DL (ref 3.5–5.2)
ALBUMIN/GLOB SERPL: 1.4 G/DL
ALP SERPL-CCNC: 75 U/L (ref 39–117)
ALT SERPL W P-5'-P-CCNC: 83 U/L (ref 1–41)
ANION GAP SERPL CALCULATED.3IONS-SCNC: 13.3 MMOL/L (ref 5–15)
AST SERPL-CCNC: 79 U/L (ref 1–40)
BASOPHILS # BLD AUTO: 0.07 10*3/MM3 (ref 0–0.2)
BASOPHILS NFR BLD AUTO: 1.1 % (ref 0–1.5)
BILIRUB SERPL-MCNC: 0.7 MG/DL (ref 0–1.2)
BUN SERPL-MCNC: 6 MG/DL (ref 6–20)
BUN/CREAT SERPL: 5.7 (ref 7–25)
CALCIUM SPEC-SCNC: 8.8 MG/DL (ref 8.6–10.5)
CHLORIDE SERPL-SCNC: 106 MMOL/L (ref 98–107)
CO2 SERPL-SCNC: 21.7 MMOL/L (ref 22–29)
CREAT SERPL-MCNC: 1.05 MG/DL (ref 0.76–1.27)
DEPRECATED RDW RBC AUTO: 45.4 FL (ref 37–54)
EGFRCR SERPLBLD CKD-EPI 2021: 92.6 ML/MIN/1.73
EOSINOPHIL # BLD AUTO: 0.27 10*3/MM3 (ref 0–0.4)
EOSINOPHIL NFR BLD AUTO: 4.2 % (ref 0.3–6.2)
ERYTHROCYTE [DISTWIDTH] IN BLOOD BY AUTOMATED COUNT: 14 % (ref 12.3–15.4)
GLOBULIN UR ELPH-MCNC: 3 GM/DL
GLUCOSE SERPL-MCNC: 116 MG/DL (ref 65–99)
HCT VFR BLD AUTO: 47.8 % (ref 37.5–51)
HGB BLD-MCNC: 17 G/DL (ref 13–17.7)
IMM GRANULOCYTES # BLD AUTO: 0.02 10*3/MM3 (ref 0–0.05)
IMM GRANULOCYTES NFR BLD AUTO: 0.3 % (ref 0–0.5)
LYMPHOCYTES # BLD AUTO: 1.59 10*3/MM3 (ref 0.7–3.1)
LYMPHOCYTES NFR BLD AUTO: 25 % (ref 19.6–45.3)
MCH RBC QN AUTO: 31.9 PG (ref 26.6–33)
MCHC RBC AUTO-ENTMCNC: 35.6 G/DL (ref 31.5–35.7)
MCV RBC AUTO: 89.7 FL (ref 79–97)
MONOCYTES # BLD AUTO: 0.68 10*3/MM3 (ref 0.1–0.9)
MONOCYTES NFR BLD AUTO: 10.7 % (ref 5–12)
NEUTROPHILS NFR BLD AUTO: 3.74 10*3/MM3 (ref 1.7–7)
NEUTROPHILS NFR BLD AUTO: 58.7 % (ref 42.7–76)
NRBC BLD AUTO-RTO: 0 /100 WBC (ref 0–0.2)
PLATELET # BLD AUTO: 134 10*3/MM3 (ref 140–450)
PMV BLD AUTO: 9.1 FL (ref 6–12)
POTASSIUM SERPL-SCNC: 3.7 MMOL/L (ref 3.5–5.2)
PROT SERPL-MCNC: 7.2 G/DL (ref 6–8.5)
RBC # BLD AUTO: 5.33 10*6/MM3 (ref 4.14–5.8)
SODIUM SERPL-SCNC: 141 MMOL/L (ref 136–145)
WBC NRBC COR # BLD: 6.37 10*3/MM3 (ref 3.4–10.8)

## 2023-08-30 PROCEDURE — 93005 ELECTROCARDIOGRAM TRACING: CPT | Performed by: EMERGENCY MEDICINE

## 2023-08-30 PROCEDURE — 99283 EMERGENCY DEPT VISIT LOW MDM: CPT

## 2023-08-30 PROCEDURE — 85025 COMPLETE CBC W/AUTO DIFF WBC: CPT | Performed by: EMERGENCY MEDICINE

## 2023-08-30 PROCEDURE — 80053 COMPREHEN METABOLIC PANEL: CPT | Performed by: EMERGENCY MEDICINE

## 2023-08-30 RX ORDER — SODIUM CHLORIDE 0.9 % (FLUSH) 0.9 %
10 SYRINGE (ML) INJECTION AS NEEDED
Status: DISCONTINUED | OUTPATIENT
Start: 2023-08-30 | End: 2023-08-31 | Stop reason: HOSPADM

## 2023-08-30 RX ADMIN — SODIUM CHLORIDE, POTASSIUM CHLORIDE, SODIUM LACTATE AND CALCIUM CHLORIDE 1000 ML: 600; 310; 30; 20 INJECTION, SOLUTION INTRAVENOUS at 23:15

## 2023-08-30 NOTE — Clinical Note
[facility] EMERGENCY ROOM  [address]  [place] [address]  Phone: [phone]    Kurt Balbuena was seen and treated in our emergency department on 8/30/2023.  He may return to work on 09/01/2023.         Thank you for choosing [facility].    Lm Ruth MD

## 2023-08-31 LAB
QT INTERVAL: 343 MS
QTC INTERVAL: 457 MS

## 2023-08-31 NOTE — ED PROVIDER NOTES
Time: 10:34 PM EDT  Date of encounter:  8/30/2023  Independent Historian/Clinical History and Information was obtained by:   Patient and Family    History is limited by: Acuity of Condition    Chief Complaint: Seizure      History of Present Illness:  Patient is a 37 y.o. year old male who presents to the emergency department for evaluation of seizure    Patient states he was feeling overheated and hot prior to the onset episode.  Now he complains of feeling tired and drained and somewhat confused.  He denies any pain.  No tongue bite.  No incontinence.    Family at bedside states that he was becoming overheated when he seemed to lose consciousness and slumped forward onto his significant other who lowered him to the ground where he had brief shaking movements and was seemingly unconscious for several minutes.  He returned to consciousness spontaneously without treatment.  He had no stiffness or increased tone.  They did not witness any injury.  He has had previous episode of seizure activity in 2022 which had different appearance than today's event.    HPI    Patient Care Team  Primary Care Provider: Dalila Moon APRN    Past Medical History:     Allergies   Allergen Reactions    Benadryl [Diphenhydramine] Hallucinations     Past Medical History:   Diagnosis Date    Hernia of abdominal wall     Hypertension     MI (myocardial infarction)      Past Surgical History:   Procedure Laterality Date    APPENDECTOMY       History reviewed. No pertinent family history.    Home Medications:  Prior to Admission medications    Medication Sig Start Date End Date Taking? Authorizing Provider   fluticasone (FLONASE) 50 MCG/ACT nasal spray 2 sprays into the nostril(s) as directed by provider Daily. 8/22/23   Kristin Boston APRN   meclizine (ANTIVERT) 12.5 MG tablet Take 1 tablet by mouth 3 (Three) Times a Day As Needed for Dizziness or Nausea for up to 14 days. 8/18/23 9/1/23  Cooksey, John Calvin, PA-C  "  methylPREDNISolone (MEDROL) 4 MG dose pack Take as directed on package instructions. 8/22/23   Kristin Boston, WALLACE        Social History:   Social History     Tobacco Use    Smoking status: Never     Passive exposure: Never    Smokeless tobacco: Current     Types: Snuff   Vaping Use    Vaping Use: Never used   Substance Use Topics    Alcohol use: Not Currently     Comment: occ    Drug use: Never         Review of Systems:  Review of Systems   Constitutional:  Positive for fatigue. Negative for chills and fever.   HENT:  Negative for congestion, ear pain and sore throat.    Eyes:  Negative for pain.   Respiratory:  Negative for cough, chest tightness and shortness of breath.    Cardiovascular:  Negative for chest pain.   Gastrointestinal:  Negative for abdominal pain, diarrhea, nausea and vomiting.   Endocrine: Positive for heat intolerance.   Genitourinary:  Negative for flank pain and hematuria.   Musculoskeletal:  Negative for joint swelling.   Skin:  Negative for pallor.   Neurological:  Negative for seizures and headaches.   All other systems reviewed and are negative.     Physical Exam:  /92 (BP Location: Left arm, Patient Position: Lying)   Pulse 99   Temp 98.6 °F (37 °C) (Oral)   Resp 18   Ht 172.7 cm (68\")   Wt 129 kg (283 lb 11.2 oz)   SpO2 92%   BMI 43.14 kg/m²     Physical Exam  Vitals and nursing note reviewed.   Constitutional:       General: He is not in acute distress.     Appearance: Normal appearance. He is not toxic-appearing.   HENT:      Head: Normocephalic and atraumatic.      Jaw: There is normal jaw occlusion.   Eyes:      General: Lids are normal.      Extraocular Movements: Extraocular movements intact.      Conjunctiva/sclera: Conjunctivae normal.      Pupils: Pupils are equal, round, and reactive to light.   Cardiovascular:      Rate and Rhythm: Regular rhythm. Tachycardia present.      Pulses: Normal pulses.      Heart sounds: Normal heart sounds.   Pulmonary:      " Effort: Pulmonary effort is normal. No respiratory distress.      Breath sounds: Normal breath sounds. No wheezing or rhonchi.   Abdominal:      General: Abdomen is flat.      Palpations: Abdomen is soft.      Tenderness: There is no abdominal tenderness. There is no guarding or rebound.   Musculoskeletal:         General: Normal range of motion.      Cervical back: Normal range of motion and neck supple.      Right lower leg: No edema.      Left lower leg: No edema.   Skin:     General: Skin is warm and dry.      Comments: Erythema over head face and bilateral arms consistent with sun exposure.    Singeing of bilateral arm areas consistent with exposure to campfire.   Neurological:      Mental Status: He is alert and oriented to person, place, and time. Mental status is at baseline.   Psychiatric:         Mood and Affect: Mood normal.             Procedures:  Procedures      Medical Decision Making:      Comorbidities that affect care:    Myocardial infarction, hypertension    External Notes reviewed:    Previous Clinic Note: Urgent care visit June July 2023      The following orders were placed and all results were independently analyzed by me:  Orders Placed This Encounter   Procedures    Comprehensive Metabolic Panel    CBC Auto Differential    ECG 12 Lead Syncope    Insert Peripheral IV    CBC & Differential       Medications Given in the Emergency Department:  Medications   sodium chloride 0.9 % flush 10 mL (has no administration in time range)   lactated ringers bolus 1,000 mL (0 mL Intravenous Stopped 8/31/23 0008)        ED Course:    ED Course as of 08/31/23 0034   Wed Aug 30, 2023   2252 My interpretation of EKG: Sinus tachycardia 106, no acute ischemia, morphology unchanged from previous June 2023 [JS]      ED Course User Index  [JS] Lm Ruth MD       Labs:    Lab Results (last 24 hours)       Procedure Component Value Units Date/Time    CBC & Differential [[id number]]  (Abnormal) Collected:  08/30/23 2314    Specimen: Blood Updated: 08/30/23 2320    Narrative:      The following orders were created for panel order CBC & Differential.  Procedure                               Abnormality         Status                     ---------                               -----------         ------                     CBC Auto Differential[[id number]]        Abnormal            Final result               Scan Slide[[id number]]                                                                    Please view results for these tests on the individual orders.    Comprehensive Metabolic Panel [[id number]]  (Abnormal) Collected: 08/30/23 2314    Specimen: Blood Updated: 08/30/23 2346     Glucose 116 mg/dL      BUN 6 mg/dL      Creatinine 1.05 mg/dL      Sodium 141 mmol/L      Potassium 3.7 mmol/L      Chloride 106 mmol/L      CO2 21.7 mmol/L      Calcium 8.8 mg/dL      Total Protein 7.2 g/dL      Albumin 4.2 g/dL      ALT (SGPT) 83 U/L      AST (SGOT) 79 U/L      Alkaline Phosphatase 75 U/L      Total Bilirubin 0.7 mg/dL      Globulin 3.0 gm/dL      A/G Ratio 1.4 g/dL      BUN/Creatinine Ratio 5.7     Anion Gap 13.3 mmol/L      eGFR 92.6 mL/min/1.73     Narrative:      GFR Normal >60  Chronic Kidney Disease <60  Kidney Failure <15      CBC Auto Differential [[id number]]  (Abnormal) Collected: 08/30/23 2314    Specimen: Blood Updated: 08/30/23 2320     WBC 6.37 10*3/mm3      RBC 5.33 10*6/mm3      Hemoglobin 17.0 g/dL      Hematocrit 47.8 %      MCV 89.7 fL      MCH 31.9 pg      MCHC 35.6 g/dL      RDW 14.0 %      RDW-SD 45.4 fl      MPV 9.1 fL      Platelets 134 10*3/mm3      Neutrophil % 58.7 %      Lymphocyte % 25.0 %      Monocyte % 10.7 %      Eosinophil % 4.2 %      Basophil % 1.1 %      Immature Grans % 0.3 %      Neutrophils, Absolute 3.74 10*3/mm3      Lymphocytes, Absolute 1.59 10*3/mm3      Monocytes, Absolute 0.68 10*3/mm3      Eosinophils, Absolute 0.27 10*3/mm3      Basophils, Absolute 0.07 10*3/mm3      Immature  Grans, Absolute 0.02 10*3/mm3      nRBC 0.0 /100 WBC              Imaging:    No Radiology Exams Resulted Within Past 24 Hours      Differential Diagnosis and Discussion:    Syncope: Differential diagnosis includes but is not limited to TIA, hyperventilation, aortic stenosis, pulmonary emboli, myocardial disease, bradycardia arrhythmia, heart block, tachyarrhythmia, vasovagal, orthostatic hypotension, ruptured AAA, aortic dissection, subarachnoid hemorrhage, seizure, hypoglycemia.  Weakness: Based on the patient's history, signs, and symptoms, the diffential diagnosis includes but is not limited to meningitis, stroke, sepsis, subarachnoid hemorrhage, intracranial bleeding, encephalitis, acute uti, dehydration, MS, myasthenia gravis, Guillan [place], migraine variant, neuromuscular disorders vertigo, electrolyte imbalance, and metabolic disorders.    All labs were reviewed and interpreted by me.  EKG was interpreted by me.    MDM           Patient Care Considerations:    CT HEAD: I considered ordering a noncontrast CT of the head, however patient has a nonfocal neurologic exam and history most consistent with syncope rather than seizure.      Consultants/Shared Management Plan:    None    Social Determinants of Health:    Patient has presented with family members who are responsible, reliable and will ensure follow up care.      Disposition and Care Coordination:    Discharged: The patient is suitable and stable for discharge with no need for consideration of observation or admission.    I have explained the patient´s condition, diagnoses and treatment plan based on the information available to me at this time. I have answered questions and addressed any concerns. The patient has a good  understanding of the patient´s diagnosis, condition, and treatment plan as can be expected at this point. The vital signs have been stable. The patient´s condition is stable and appropriate for discharge from the emergency department.       The patient will pursue further outpatient evaluation with the primary care physician or other designated or consulting physician as outlined in the discharge instructions. They are agreeable to this plan of care and follow-up instructions have been explained in detail. The patient has received these instructions in written format and have expressed an understanding of the discharge instructions. The patient is aware that any significant change in condition or worsening of symptoms should prompt an immediate return to this or the closest emergency department or call to 911.  I have explained discharge medications and the need for follow up with the patient/caretakers. This was also printed in the discharge instructions. Patient was discharged with the following medications and follow up:      Medication List      No changes were made to your prescriptions during this visit.      Dalila Moon, APRN  [address] [address]  [phone]    Schedule an appointment as soon as possible for a visit          Final diagnoses:   Heat exposure, initial encounter   Syncope, unspecified syncope type        ED Disposition       ED Disposition   Discharge    Condition   Stable    Comment   --               This medical record created using voice recognition software.             Lm Ruth MD  08/31/23 0034

## 2023-09-04 ENCOUNTER — HOSPITAL ENCOUNTER (EMERGENCY)
Facility: HOSPITAL | Age: 38
Discharge: HOME | End: 2023-09-05
Attending: EMERGENCY MEDICINE
Payer: COMMERCIAL

## 2023-09-04 DIAGNOSIS — F10.929 ACUTE ALCOHOLIC INTOXICATION WITH COMPLICATION: ICD-10-CM

## 2023-09-04 DIAGNOSIS — G40.409 GENERALIZED TONIC-CLONIC SEIZURE: Primary | ICD-10-CM

## 2023-09-04 LAB
ALBUMIN SERPL-MCNC: 4.2 G/DL (ref 3.5–5.2)
ALBUMIN/GLOB SERPL: 1.4 G/DL
ALP SERPL-CCNC: 70 U/L (ref 39–117)
ALT SERPL W P-5'-P-CCNC: 100 U/L (ref 1–41)
ANION GAP SERPL CALCULATED.3IONS-SCNC: 12.8 MMOL/L (ref 5–15)
AST SERPL-CCNC: 86 U/L (ref 1–40)
BASOPHILS # BLD AUTO: 0.09 10*3/MM3 (ref 0–0.2)
BASOPHILS NFR BLD AUTO: 1.2 % (ref 0–1.5)
BILIRUB SERPL-MCNC: 0.8 MG/DL (ref 0–1.2)
BUN SERPL-MCNC: 5 MG/DL (ref 6–20)
BUN/CREAT SERPL: 5.3 (ref 7–25)
CALCIUM SPEC-SCNC: 9 MG/DL (ref 8.6–10.5)
CHLORIDE SERPL-SCNC: 105 MMOL/L (ref 98–107)
CO2 SERPL-SCNC: 23.2 MMOL/L (ref 22–29)
CREAT SERPL-MCNC: 0.95 MG/DL (ref 0.76–1.27)
DEPRECATED RDW RBC AUTO: 45.2 FL (ref 37–54)
EGFRCR SERPLBLD CKD-EPI 2021: 104.4 ML/MIN/1.73
EOSINOPHIL # BLD AUTO: 0.26 10*3/MM3 (ref 0–0.4)
EOSINOPHIL NFR BLD AUTO: 3.3 % (ref 0.3–6.2)
ERYTHROCYTE [DISTWIDTH] IN BLOOD BY AUTOMATED COUNT: 14 % (ref 12.3–15.4)
GLOBULIN UR ELPH-MCNC: 3.1 GM/DL
GLUCOSE SERPL-MCNC: 114 MG/DL (ref 65–99)
HCT VFR BLD AUTO: 47.8 % (ref 37.5–51)
HGB BLD-MCNC: 17.3 G/DL (ref 13–17.7)
IMM GRANULOCYTES # BLD AUTO: 0.03 10*3/MM3 (ref 0–0.05)
IMM GRANULOCYTES NFR BLD AUTO: 0.4 % (ref 0–0.5)
LYMPHOCYTES # BLD AUTO: 1.81 10*3/MM3 (ref 0.7–3.1)
LYMPHOCYTES NFR BLD AUTO: 23.3 % (ref 19.6–45.3)
MCH RBC QN AUTO: 32.3 PG (ref 26.6–33)
MCHC RBC AUTO-ENTMCNC: 36.2 G/DL (ref 31.5–35.7)
MCV RBC AUTO: 89.3 FL (ref 79–97)
MONOCYTES # BLD AUTO: 0.73 10*3/MM3 (ref 0.1–0.9)
MONOCYTES NFR BLD AUTO: 9.4 % (ref 5–12)
NEUTROPHILS NFR BLD AUTO: 4.86 10*3/MM3 (ref 1.7–7)
NEUTROPHILS NFR BLD AUTO: 62.4 % (ref 42.7–76)
NRBC BLD AUTO-RTO: 0 /100 WBC (ref 0–0.2)
PLATELET # BLD AUTO: 146 10*3/MM3 (ref 140–450)
PMV BLD AUTO: 8.8 FL (ref 6–12)
POTASSIUM SERPL-SCNC: 3.7 MMOL/L (ref 3.5–5.2)
PROT SERPL-MCNC: 7.3 G/DL (ref 6–8.5)
RBC # BLD AUTO: 5.35 10*6/MM3 (ref 4.14–5.8)
SODIUM SERPL-SCNC: 141 MMOL/L (ref 136–145)
WBC NRBC COR # BLD: 7.78 10*3/MM3 (ref 3.4–10.8)
WHOLE BLOOD HOLD COAG: NORMAL
WHOLE BLOOD HOLD SPECIMEN: NORMAL

## 2023-09-04 PROCEDURE — 80177 DRUG SCRN QUAN LEVETIRACETAM: CPT | Performed by: EMERGENCY MEDICINE

## 2023-09-04 PROCEDURE — 93010 ELECTROCARDIOGRAM REPORT: CPT | Performed by: INTERNAL MEDICINE

## 2023-09-04 PROCEDURE — 93005 ELECTROCARDIOGRAM TRACING: CPT | Performed by: EMERGENCY MEDICINE

## 2023-09-04 PROCEDURE — 93005 ELECTROCARDIOGRAM TRACING: CPT

## 2023-09-04 PROCEDURE — 82077 ASSAY SPEC XCP UR&BREATH IA: CPT | Performed by: EMERGENCY MEDICINE

## 2023-09-04 PROCEDURE — 80053 COMPREHEN METABOLIC PANEL: CPT

## 2023-09-04 PROCEDURE — 85025 COMPLETE CBC W/AUTO DIFF WBC: CPT

## 2023-09-04 PROCEDURE — 99284 EMERGENCY DEPT VISIT MOD MDM: CPT

## 2023-09-04 RX ORDER — SODIUM CHLORIDE 0.9 % (FLUSH) 0.9 %
10 SYRINGE (ML) INJECTION AS NEEDED
Status: DISCONTINUED | OUTPATIENT
Start: 2023-09-04 | End: 2023-09-05 | Stop reason: HOSPADM

## 2023-09-04 NOTE — Clinical Note
[facility] EMERGENCY ROOM  [address]  [place] [address]  Phone: [phone]    Delvin Balbuena accompanied Kurt Balbuena to the emergency department on 9/4/2023. They may return to school on 09/06/2023.          Thank you for choosing [facility].      Bakari Swanson MD

## 2023-09-04 NOTE — Clinical Note
[facility] EMERGENCY ROOM  [address] [place] [address]  Phone: [phone]    Rajeev Day accompanied Kurt Day to the emergency department on 9/4/2023. They may return to school on 09/06/2023.          Thank you for choosing [facility].      Bakari Swanson MD

## 2023-09-04 NOTE — Clinical Note
[facility] EMERGENCY ROOM  [address] [address]  Phone: [phone]    Kurt Balbuena was seen and treated in our emergency department on 9/4/2023.  He may return to work on 09/06/2023.         Thank you for choosing [facility].    Bakari Swanson MD

## 2023-09-05 ENCOUNTER — HOSPITAL ENCOUNTER (EMERGENCY)
Facility: HOSPITAL | Age: 38
Discharge: HOME | End: 2023-09-06
Attending: EMERGENCY MEDICINE
Payer: COMMERCIAL

## 2023-09-05 ENCOUNTER — APPOINTMENT (OUTPATIENT)
Dept: CT IMAGING | Facility: HOSPITAL | Age: 38
End: 2023-09-05
Payer: COMMERCIAL

## 2023-09-05 VITALS
DIASTOLIC BLOOD PRESSURE: 88 MMHG | OXYGEN SATURATION: 96 % | HEART RATE: 87 BPM | TEMPERATURE: 98.2 F | RESPIRATION RATE: 18 BRPM | WEIGHT: 280.2 LBS | SYSTOLIC BLOOD PRESSURE: 124 MMHG | HEIGHT: 67 IN | BODY MASS INDEX: 43.98 KG/M2

## 2023-09-05 DIAGNOSIS — R56.9 GENERALIZED SEIZURES: Primary | ICD-10-CM

## 2023-09-05 LAB
ETHANOL BLD-MCNC: 189 MG/DL (ref 0–10)
ETHANOL UR QL: 0.19 %
HOLD SPECIMEN: NORMAL
QT INTERVAL: 335 MS
QTC INTERVAL: 424 MS
WHOLE BLOOD HOLD COAG: NORMAL
WHOLE BLOOD HOLD SPECIMEN: NORMAL

## 2023-09-05 PROCEDURE — 80177 DRUG SCRN QUAN LEVETIRACETAM: CPT | Performed by: EMERGENCY MEDICINE

## 2023-09-05 PROCEDURE — 0 LEVETIRACETAM IN NACL 0.75% 1000 MG/100ML SOLUTION: Performed by: EMERGENCY MEDICINE

## 2023-09-05 PROCEDURE — 99283 EMERGENCY DEPT VISIT LOW MDM: CPT

## 2023-09-05 PROCEDURE — 96374 THER/PROPH/DIAG INJ IV PUSH: CPT

## 2023-09-05 PROCEDURE — 70450 CT HEAD/BRAIN W/O DYE: CPT

## 2023-09-05 RX ORDER — LEVETIRACETAM 500 MG/1
500 TABLET ORAL 2 TIMES DAILY
Qty: 60 TABLET | Refills: 0 | OUTPATIENT
Start: 2023-09-05 | End: 2023-09-18

## 2023-09-05 RX ORDER — SODIUM CHLORIDE 0.9 % (FLUSH) 0.9 %
10 SYRINGE (ML) INJECTION AS NEEDED
Status: DISCONTINUED | OUTPATIENT
Start: 2023-09-05 | End: 2023-09-06 | Stop reason: HOSPADM

## 2023-09-05 RX ORDER — LEVETIRACETAM 10 MG/ML
1000 INJECTION INTRAVASCULAR ONCE
Status: COMPLETED | OUTPATIENT
Start: 2023-09-05 | End: 2023-09-05

## 2023-09-05 RX ADMIN — LEVETIRACETAM 1000 MG: 10 INJECTION INTRAVENOUS at 03:32

## 2023-09-05 RX ADMIN — SODIUM CHLORIDE 1000 ML: 9 INJECTION, SOLUTION INTRAVENOUS at 03:32

## 2023-09-05 NOTE — Clinical Note
[facility] EMERGENCY ROOM  [address]  [place] [address]  Phone: [phone]    kurt hdez accompanied Kurt Hdez to the emergency department on 9/5/2023. They may return to school on 09/07/2023.          Thank you for choosing [facility].      Bakari Swanson MD

## 2023-09-05 NOTE — Clinical Note
[facility] EMERGENCY ROOM  [address]  [place] [address]  Phone: [phone]    kurt hdez accompanied Kurt Hdez to the emergency department on 9/5/2023. They may return to school on 09/07/2023.          Thank you for choosing [facility].      Bakari Swanson MD

## 2023-09-05 NOTE — ED PROVIDER NOTES
"Time: 3:01 AM EDT  Date of encounter:  9/4/2023  Independent Historian/Clinical History and Information was obtained by:   Patient    History is limited by: N/A    Chief Complaint: Seizure, alcohol intoxication      History of Present Illness:  Patient is a 37 y.o. year old male who presents to the emergency department for evaluation of witnessed seizures last night.    Patient's significant other at the bedside reports that her and the children witnessed him having generalized shaking and foaming at the mouth and eyes rolled back lasting a few minutes at a time, 3 separate times tonight.    This occurred after he drank 3 beers or \"tall ones\", which is fairly typical for him.    He denies any recent head injury or fever or illness.    He has a remote history of seizures but has not had one in a long time and does not take seizure medicine.  He used to see Dr. Starks of neurology.    HPI    Patient Care Team  Primary Care Provider: Dalila Moon APRN    Past Medical History:     Allergies   Allergen Reactions    Benadryl [Diphenhydramine] Hallucinations     Past Medical History:   Diagnosis Date    Hernia of abdominal wall     Hypertension     MI (myocardial infarction)      Past Surgical History:   Procedure Laterality Date    APPENDECTOMY       History reviewed. No pertinent family history.    Home Medications:  Prior to Admission medications    Medication Sig Start Date End Date Taking? Authorizing Provider   fluticasone (FLONASE) 50 MCG/ACT nasal spray 2 sprays into the nostril(s) as directed by provider Daily. 8/22/23   Kristin Boston APRN   methylPREDNISolone (MEDROL) 4 MG dose pack Take as directed on package instructions. 8/22/23   Kristin Boston APRN        Social History:   Social History     Tobacco Use    Smoking status: Never     Passive exposure: Never    Smokeless tobacco: Current     Types: Snuff   Vaping Use    Vaping Use: Never used   Substance Use Topics    Alcohol use: Not " "Currently     Comment: occ    Drug use: Never         Review of Systems:  Review of Systems   I performed a 10 point review of systems which was all negative, except for the positives found in the HPI above.  Physical Exam:  /88   Pulse 87   Temp 98.2 °F (36.8 °C) (Oral)   Resp 18   Ht 170.2 cm (67\")   Wt 127 kg (280 lb 3.3 oz)   SpO2 93%   BMI 43.89 kg/m²     Physical Exam   General: Somnolent but arousable, and in no obvious distress, intoxicated or postictal    HEENT: Head normocephalic atraumatic, eyes PERRLA EOMI, nose normal, oropharynx normal.  No tongue biting seen.    Neck: Supple full range of motion, no meningismus, no lymphadenopathy    Heart: Regular rate and rhythm, no murmurs or rubs, 2+ radial pulses bilaterally    Lungs: Clear to auscultation bilaterally without wheezes or crackles, no respiratory distress    Abdomen: Soft, nontender, nondistended, no rebound or guarding    Skin: Warm, dry, no rash    Musculoskeletal: Normal range of motion, no lower extremity edema    Neurologic: Initially somnolent or postictal, but once awakened he was oriented x3, no motor deficits no sensory deficits    Psychiatric: Mood appears stable, no psychosis          Procedures:  Procedures      Medical Decision Making:      Comorbidities that affect care:    Seizure disorder, alcohol abuse    External Notes reviewed:    None      The following orders were placed and all results were independently analyzed by me:  Orders Placed This Encounter   Procedures    CT Head Without Contrast    [place] Draw    CBC Auto Differential    Levetiracetam Level (Keppra)    Comprehensive Metabolic Panel    Ethanol    NPO Diet NPO Type: Strict NPO    Continuous Pulse Oximetry    Vital Signs    Oxygen Therapy- Nasal Cannula; Titrate 1-6 LPM Per SpO2; 90 - 95%    POC Glucose Once    ECG 12 Lead Other; Seizure    Insert Peripheral IV    Seizure Precautions    CBC & Differential    Green Top (Gel)    Lavender Top    Gold Top - " SST    Light Blue Top       Medications Given in the Emergency Department:  Medications   sodium chloride 0.9 % flush 10 mL (has no administration in time range)   levETIRAcetam in NaCl 0.75% (KEPPRA) IVPB 1,000 mg (0 mg Intravenous Stopped 9/5/23 0347)   sodium chloride 0.9 % bolus 1,000 mL (1,000 mL Intravenous New Bag 9/5/23 0332)        ED Course:         Labs:    Lab Results (last 24 hours)       Procedure Component Value Units Date/Time    CBC & Differential [[id number]]  (Abnormal) Collected: 09/04/23 2307    Specimen: Blood Updated: 09/04/23 2317    Narrative:      The following orders were created for panel order CBC & Differential.  Procedure                               Abnormality         Status                     ---------                               -----------         ------                     CBC Auto Differential[[id number]]        Abnormal            Final result                 Please view results for these tests on the individual orders.    CBC Auto Differential [[id number]]  (Abnormal) Collected: 09/04/23 2307    Specimen: Blood Updated: 09/04/23 2317     WBC 7.78 10*3/mm3      RBC 5.35 10*6/mm3      Hemoglobin 17.3 g/dL      Hematocrit 47.8 %      MCV 89.3 fL      MCH 32.3 pg      MCHC 36.2 g/dL      RDW 14.0 %      RDW-SD 45.2 fl      MPV 8.8 fL      Platelets 146 10*3/mm3      Neutrophil % 62.4 %      Lymphocyte % 23.3 %      Monocyte % 9.4 %      Eosinophil % 3.3 %      Basophil % 1.2 %      Immature Grans % 0.4 %      Neutrophils, Absolute 4.86 10*3/mm3      Lymphocytes, Absolute 1.81 10*3/mm3      Monocytes, Absolute 0.73 10*3/mm3      Eosinophils, Absolute 0.26 10*3/mm3      Basophils, Absolute 0.09 10*3/mm3      Immature Grans, Absolute 0.03 10*3/mm3      nRBC 0.0 /100 WBC     Levetiracetam Level (Keppra) [[id number]] Collected: 09/04/23 2307    Specimen: Blood Updated: 09/04/23 2313    Comprehensive Metabolic Panel [[id number]]  (Abnormal) Collected: 09/04/23 2307    Specimen: Blood  Updated: 09/04/23 2340     Glucose 114 mg/dL      BUN 5 mg/dL      Creatinine 0.95 mg/dL      Sodium 141 mmol/L      Potassium 3.7 mmol/L      Comment: Slight hemolysis detected by analyzer. Results may be affected.        Chloride 105 mmol/L      CO2 23.2 mmol/L      Calcium 9.0 mg/dL      Total Protein 7.3 g/dL      Albumin 4.2 g/dL      ALT (SGPT) 100 U/L      AST (SGOT) 86 U/L      Comment: Slight hemolysis detected by analyzer. Results may be affected.        Alkaline Phosphatase 70 U/L      Total Bilirubin 0.8 mg/dL      Globulin 3.1 gm/dL      A/G Ratio 1.4 g/dL      BUN/Creatinine Ratio 5.3     Anion Gap 12.8 mmol/L      eGFR 104.4 mL/min/1.73     Narrative:      GFR Normal >60  Chronic Kidney Disease <60  Kidney Failure <15      Ethanol [[id number]]  (Abnormal) Collected: 09/04/23 2307    Specimen: Blood Updated: 09/05/23 0325     Ethanol 189 mg/dL      Ethanol % 0.189 %     Narrative:      Ethanol (Plasma)  <10 Essentially Negative    Toxic Concentrations           mg/dL    [place], slowing of reflexes    Impaired visual activity         Depression of CNS              >100  Possible Coma                  >300                Imaging:    CT Head Without Contrast    Result Date: 9/5/2023  PROCEDURE: CT HEAD WO CONTRAST  COMPARISON: 1/7/2023 (head CT w/o); 7/1/2011 (unavailable).  INDICATIONS: Seizure, new-onset; no h/o head trauma.  PROTOCOL:   Standard CT imaging protocol performed.    RADIATION:   Total DLP: 1,017.2 mGy*cm.   MA and/or KV were/was adjusted to minimize radiation dose.    TECHNIQUE: After obtaining the patient's consent, 130 CT images were obtained without non-ionic intravenous contrast material.  DISCUSSION:   A routine nonenhanced head CT was performed.  No acute brain abnormality is identified.  No acute intracranial hemorrhage.  No acute infarct is seen.  No acute skull fracture.  No midline shift or acute intracranial mass effect is seen.  The ventricular size and  configuration are within normal limits and unchanged since the prior 1/7/2023 CT study.  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  There is chronic leftward nasal septal deviation.  There is prominent density in the region of the pineal gland, which probably represents summation artifact with normal vascular structures and normal pineal gland and is probably a normal variant, as seen on image 34 series 201, unchanged since the prior study.  No significant acute findings are seen with regard to the imaged orbits, paranasal sinuses, or middle ear clefts.       No acute brain abnormality is seen. Specifically, no acute intracranial hemorrhage, no acute infarct, no significant intracranial mass lesion, and no acute intracranial mass effect are appreciated.  Please see above comments for further detail.    Please note that portions of this note were completed with a voice recognition program.  ASHLEY HONEYCUTT JR, MD       Electronically Signed and Approved By: ASHLEY HONEYCUTT JR, MD on 9/05/2023 at 4:21                 Differential Diagnosis and Discussion:    Seizure: Differential diagnosis includes but is not limited to meningitis, hypoglycemia, electrolyte abnormalities, intracranial hemorrhage, toxin induced, and pseudoseizure.    All labs were reviewed and interpreted by me.  CT scan radiology impression was interpreted by me.    MDM     Amount and/or Complexity of Data Reviewed  Clinical lab tests: reviewed  Tests in the radiology section of CPT®: reviewed  Tests in the medicine section of CPT®: reviewed                 This patient is a 37-year-old male with remote history of Lyme disease and remote history of seizures, no longer on any seizure medicine, who had witnessed generalized seizure activity tonight by his family.    It sounds like he had 3 episodes where he was shaking all over and foaming at the mouth for a few minutes.    This occurred in the setting of drinking some alcohol but he does that  fairly frequently so it does not sound like he was withdrawing or more intoxicated tonight.    I do not see any signs of head injury or fevers or meningismus.    All of his lab work and CT imaging was unremarkable.    I gave him a loading dose of IV Keppra.    He is awake now and acting normally with nonfocal neurologic exam and ambulatory without assistance and answering questions appropriately.    I do not think he requires admission and certainly he is not in status epilepticus in the ED today.    I will start him on a low-dose of Keppra 500 mg twice daily and have him follow-up urgently with his neurologist, Dr. Starks.                  Patient Care Considerations:          Consultants/Shared Management Plan:        Social Determinants of Health:    Patient has presented with family members who are responsible, reliable and will ensure follow up care.      Disposition and Care Coordination:    Discharged: I considered escalation of care by admitting this patient for observation, however the patient has improved and is suitable and  stable for discharge.    I have explained the patient´s condition, diagnoses and treatment plan based on the information available to me at this time. I have answered questions and addressed any concerns. The patient has a good  understanding of the patient´s diagnosis, condition, and treatment plan as can be expected at this point. The vital signs have been stable. The patient´s condition is stable and appropriate for discharge from the emergency department.      The patient will pursue further outpatient evaluation with the primary care physician or other designated or consulting physician as outlined in the discharge instructions. They are agreeable to this plan of care and follow-up instructions have been explained in detail. The patient has received these instructions in written format and have expressed an understanding of the discharge instructions. The patient is aware that  any significant change in condition or worsening of symptoms should prompt an immediate return to this or the closest emergency department or call to 911.  I have explained discharge medications and the need for follow up with the patient/caretakers. This was also printed in the discharge instructions. Patient was discharged with the following medications and follow up:      Medication List        New Prescriptions      levETIRAcetam 500 MG tablet  Commonly known as: KEPPRA  Take 1 tablet by mouth 2 (Two) Times a Day.               Where to Get Your Medications        These medications were sent to [facility] - [place], [place] - [address] - [phone]  - [phone] FX  [address] [facility] [place] [address]      Phone: [phone]   levETIRAcetam 500 MG tablet      Sasha Starks Jr., MD  [address] [place] [address] [address] [place] [id number] [phone]    Call in 1 day  for a follow-up appointment       Final diagnoses:   Generalized tonic-clonic seizure   Acute alcoholic intoxication with complication        ED Disposition       ED Disposition   Discharge    Condition   Stable    Comment   --               This medical record created using voice recognition software.             Bakari Swanson MD  09/05/23 0523

## 2023-09-05 NOTE — DISCHARGE INSTRUCTIONS
Your CT scan of the brain look normal and your lab work looked okay other than your blood alcohol level was moderately elevated today.    For now you may want to take a low-dose of the Keppra antiseizure medicine twice a day to prevent further seizures and please call the neurology clinic tomorrow for a follow-up appointment with Dr. Starks for further EEG monitoring.    Typically we do not recommend you drive a motor vehicle or operate any heavy machinery after having a seizure, until you are seen and cleared by your neurologist to do so.

## 2023-09-05 NOTE — Clinical Note
[facility] EMERGENCY ROOM  [address]  [place] [address]  Phone: [phone]    Rajeev Day accompanied Kurt Day to the emergency department on 9/5/2023. They may return to school on 09/07/2023.          Thank you for choosing [facility].      Bakari Swanson MD

## 2023-09-06 VITALS
OXYGEN SATURATION: 93 % | BODY MASS INDEX: 43.94 KG/M2 | HEIGHT: 67 IN | DIASTOLIC BLOOD PRESSURE: 72 MMHG | RESPIRATION RATE: 19 BRPM | HEART RATE: 103 BPM | SYSTOLIC BLOOD PRESSURE: 112 MMHG | WEIGHT: 279.98 LBS | TEMPERATURE: 98.7 F

## 2023-09-06 NOTE — DISCHARGE INSTRUCTIONS
Make sure you are taking the Keppra seizure medication twice a day as directed, and you can even increase it to taking it 3 times a day as needed if you get any more seizures.    If you if this keeps happening over the next few days despite taking the medicine as directed, then you need to come in here and get admitted for further evaluation.

## 2023-09-06 NOTE — ED PROVIDER NOTES
Time: 2:28 AM EDT  Date of encounter:  9/5/2023  Independent Historian/Clinical History and Information was obtained by:   Patient and Family    History is limited by: N/A    Chief Complaint: Generalized seizures      History of Present Illness:  Patient is a 37 y.o. year old male who presents to the emergency department for evaluation of generalized seizures today at home.    Seizures were witnessed by wife and children where he goes unresponsive and starts shaking and tensing up for few minutes and foaming at the mouth.    He was actually just seen in the ED yesterday and given a Keppra loading dose and started on Keppra 500 twice daily, but it sounds like he only took one half of a pill since he was discharged last night.    He is now sitting upright and back to baseline mentation, and asking to be discharged home, and already has scheduled follow-up with his neurologist in a couple weeks.      He denies any recent head injury or any neck stiffness or fevers or new medications or any substance abuse.    He does drink alcohol regularly but has been doing so for many years.    HPI    Patient Care Team  Primary Care Provider: Dalila Moon APRN    Past Medical History:     Allergies   Allergen Reactions    Benadryl [Diphenhydramine] Hallucinations     Past Medical History:   Diagnosis Date    Hernia of abdominal wall     Hypertension     MI (myocardial infarction)      Past Surgical History:   Procedure Laterality Date    APPENDECTOMY       History reviewed. No pertinent family history.    Home Medications:  Prior to Admission medications    Medication Sig Start Date End Date Taking? Authorizing Provider   levETIRAcetam (KEPPRA) 500 MG tablet Take 1 tablet by mouth 2 (Two) Times a Day. 9/5/23   Bakari Swanson MD        Social History:   Social History     Tobacco Use    Smoking status: Never     Passive exposure: Never    Smokeless tobacco: Current     Types: Snuff   Vaping Use    Vaping Use: Never used  "  Substance Use Topics    Alcohol use: Not Currently     Comment: occ    Drug use: Never         Review of Systems:  Review of Systems   I performed a 10 point review of systems which was all negative, except for the positives found in the HPI above.  Physical Exam:  /72   Pulse 103   Temp 98.7 °F (37.1 °C) (Oral)   Resp 19   Ht 170.2 cm (67\")   Wt 127 kg (279 lb 15.8 oz)   SpO2 93%   BMI 43.85 kg/m²     Physical Exam   General: Awake alert and in no obvious distress    HEENT: Head normocephalic atraumatic, eyes PERRLA EOMI, nose normal, oropharynx normal.    Neck: Supple full range of motion, no meningismus, no lymphadenopathy    Heart: Regular rate and rhythm, no murmurs or rubs, 2+ radial pulses bilaterally    Lungs: Clear to auscultation bilaterally without wheezes or crackles, no respiratory distress    Abdomen: Soft, nontender, nondistended, no rebound or guarding    Skin: Warm, dry, no rash    Musculoskeletal: Normal range of motion, no lower extremity edema    Neurologic: Oriented x3, no motor deficits no sensory deficits    Psychiatric: Mood appears stable, no psychosis          Procedures:  Procedures      Medical Decision Making:      Comorbidities that affect care:    Alcohol dependence, seizures    External Notes reviewed:    Previous Radiological Studies: I reviewed his recent CT of the head last night was normal and Previous Labs: Last night's lab work was reviewed and unremarkable.      The following orders were placed and all results were independently analyzed by me:  Orders Placed This Encounter   Procedures    Levetiracetam Level (Keppra)    [place] Draw    Insert peripheral IV    Green Top (Gel)    Lavender Top    Gold Top - SST    Light Blue Top       Medications Given in the Emergency Department:  Medications   sodium chloride 0.9 % flush 10 mL (has no administration in time range)        ED Course:         Labs:    Lab Results (last 24 hours)       Procedure Component Value Units " Date/Time    Levetiracetam Level (Keppra) [[id number]] Collected: 09/05/23 2215    Specimen: Blood Updated: 09/05/23 2237             Imaging:    CT Head Without Contrast    Result Date: 9/5/2023  PROCEDURE: CT HEAD WO CONTRAST  COMPARISON: 1/7/2023 (head CT w/o); 7/1/2011 (unavailable).  INDICATIONS: Seizure, new-onset; no h/o head trauma.  PROTOCOL:   Standard CT imaging protocol performed.    RADIATION:   Total DLP: 1,017.2 mGy*cm.   MA and/or KV were/was adjusted to minimize radiation dose.    TECHNIQUE: After obtaining the patient's consent, 130 CT images were obtained without non-ionic intravenous contrast material.  DISCUSSION:   A routine nonenhanced head CT was performed.  No acute brain abnormality is identified.  No acute intracranial hemorrhage.  No acute infarct is seen.  No acute skull fracture.  No midline shift or acute intracranial mass effect is seen.  The ventricular size and configuration are within normal limits and unchanged since the prior 1/7/2023 CT study.  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  There is chronic leftward nasal septal deviation.  There is prominent density in the region of the pineal gland, which probably represents summation artifact with normal vascular structures and normal pineal gland and is probably a normal variant, as seen on image 34 series 201, unchanged since the prior study.  No significant acute findings are seen with regard to the imaged orbits, paranasal sinuses, or middle ear clefts.       No acute brain abnormality is seen. Specifically, no acute intracranial hemorrhage, no acute infarct, no significant intracranial mass lesion, and no acute intracranial mass effect are appreciated.  Please see above comments for further detail.    Please note that portions of this note were completed with a voice recognition program.  ASHLEY OHNEYCUTT JR, MD       Electronically Signed and Approved By: ASHLEY HONEYCUTT JR, MD on 9/05/2023 at 4:21                  Differential Diagnosis and Discussion:    Seizure: Differential diagnosis includes but is not limited to meningitis, hypoglycemia, electrolyte abnormalities, intracranial hemorrhage, toxin induced, and pseudoseizure.    All labs were reviewed and interpreted by me.    MDM           This patient is a 37-year-old male whom I just saw in the ED last night for generalized seizures and I gave him a loading dose of Keppra and started him on Keppra 500 mg twice daily and recommended urgent follow-up with his neurologist, Dr. Starks.    It sounds like he only took half of one of his Keppra pills today and ended up having a few more generalized seizures that were witnessed by family.    He is now back to baseline mentation sitting up on the edge of the stretcher asking to be discharged home.    I do not see any head injury or neck stiffness or signs of infection like meningitis and no neurologic deficits    I do not see any tongue biting or urinary incontinence.    I offered to admit him to the hospital for more urgent neurologic evaluation and management plan for seizures, but he declined and adamantly prefers to be discharged home.    I will have him take his Keppra as prescribed and follow-up with his neurologist.              Patient Care Considerations:          Consultants/Shared Management Plan:        Social Determinants of Health:    Patient has presented with family members who are responsible, reliable and will ensure follow up care.      Disposition and Care Coordination:    Discharged: I considered escalation of care by admitting this patient to the hospital, however the patient has improved and is suitable and stable for discharge.    I have explained the patient´s condition, diagnoses and treatment plan based on the information available to me at this time. I have answered questions and addressed any concerns. The patient has a good  understanding of the patient´s diagnosis, condition, and treatment plan  as can be expected at this point. The vital signs have been stable. The patient´s condition is stable and appropriate for discharge from the emergency department.      The patient will pursue further outpatient evaluation with the primary care physician or other designated or consulting physician as outlined in the discharge instructions. They are agreeable to this plan of care and follow-up instructions have been explained in detail. The patient has received these instructions in written format and have expressed an understanding of the discharge instructions. The patient is aware that any significant change in condition or worsening of symptoms should prompt an immediate return to this or the closest emergency department or call to 1.  I have explained discharge medications and the need for follow up with the patient/caretakers. This was also printed in the discharge instructions. Patient was discharged with the following medications and follow up:      Medication List      No changes were made to your prescriptions during this visit.      Sasha Starks Jr., MD  [address] [place] [address] [address] [place] [address]  [phone]    Call   for a follow-up appointment       Final diagnoses:   Generalized seizures        ED Disposition       ED Disposition   Discharge    Condition   Stable    Comment   --               This medical record created using voice recognition software.             Bakari Swanson MD  09/06/23 0234

## 2023-09-07 LAB — LEVETIRACETAM SERPL-MCNC: <2 UG/ML (ref 10–40)

## 2023-09-08 LAB — LEVETIRACETAM SERPL-MCNC: 6.3 UG/ML (ref 10–40)

## 2023-09-12 ENCOUNTER — HOSPITAL ENCOUNTER (EMERGENCY)
Facility: HOSPITAL | Age: 38
Discharge: HOME | End: 2023-09-13
Attending: EMERGENCY MEDICINE
Payer: COMMERCIAL

## 2023-09-12 DIAGNOSIS — F10.920 ALCOHOLIC INTOXICATION WITHOUT COMPLICATION: ICD-10-CM

## 2023-09-12 DIAGNOSIS — R56.9 SEIZURE: Primary | ICD-10-CM

## 2023-09-12 LAB
ALBUMIN SERPL-MCNC: 4.3 G/DL (ref 3.5–5.2)
ALBUMIN/GLOB SERPL: 1.3 G/DL
ALP SERPL-CCNC: 77 U/L (ref 39–117)
ALT SERPL W P-5'-P-CCNC: 61 U/L (ref 1–41)
ANION GAP SERPL CALCULATED.3IONS-SCNC: 12.5 MMOL/L (ref 5–15)
AST SERPL-CCNC: 75 U/L (ref 1–40)
BASOPHILS # BLD AUTO: 0.11 10*3/MM3 (ref 0–0.2)
BASOPHILS NFR BLD AUTO: 1.6 % (ref 0–1.5)
BILIRUB SERPL-MCNC: 1 MG/DL (ref 0–1.2)
BUN SERPL-MCNC: 5 MG/DL (ref 6–20)
BUN/CREAT SERPL: 5.5 (ref 7–25)
CALCIUM SPEC-SCNC: 9.2 MG/DL (ref 8.6–10.5)
CHLORIDE SERPL-SCNC: 104 MMOL/L (ref 98–107)
CO2 SERPL-SCNC: 24.5 MMOL/L (ref 22–29)
CREAT SERPL-MCNC: 0.91 MG/DL (ref 0.76–1.27)
DEPRECATED RDW RBC AUTO: 44.6 FL (ref 37–54)
EGFRCR SERPLBLD CKD-EPI 2021: 109.9 ML/MIN/1.73
EOSINOPHIL # BLD AUTO: 0.33 10*3/MM3 (ref 0–0.4)
EOSINOPHIL NFR BLD AUTO: 4.9 % (ref 0.3–6.2)
ERYTHROCYTE [DISTWIDTH] IN BLOOD BY AUTOMATED COUNT: 13.9 % (ref 12.3–15.4)
ETHANOL BLD-MCNC: 192 MG/DL (ref 0–10)
ETHANOL UR QL: 0.19 %
GLOBULIN UR ELPH-MCNC: 3.2 GM/DL
GLUCOSE SERPL-MCNC: 105 MG/DL (ref 65–99)
HCT VFR BLD AUTO: 50.4 % (ref 37.5–51)
HGB BLD-MCNC: 17.5 G/DL (ref 13–17.7)
HOLD SPECIMEN: NORMAL
HOLD SPECIMEN: NORMAL
IMM GRANULOCYTES # BLD AUTO: 0.04 10*3/MM3 (ref 0–0.05)
IMM GRANULOCYTES NFR BLD AUTO: 0.6 % (ref 0–0.5)
LYMPHOCYTES # BLD AUTO: 1.97 10*3/MM3 (ref 0.7–3.1)
LYMPHOCYTES NFR BLD AUTO: 29.2 % (ref 19.6–45.3)
MCH RBC QN AUTO: 31 PG (ref 26.6–33)
MCHC RBC AUTO-ENTMCNC: 34.7 G/DL (ref 31.5–35.7)
MCV RBC AUTO: 89.2 FL (ref 79–97)
MONOCYTES # BLD AUTO: 0.44 10*3/MM3 (ref 0.1–0.9)
MONOCYTES NFR BLD AUTO: 6.5 % (ref 5–12)
NEUTROPHILS NFR BLD AUTO: 3.86 10*3/MM3 (ref 1.7–7)
NEUTROPHILS NFR BLD AUTO: 57.2 % (ref 42.7–76)
NRBC BLD AUTO-RTO: 0 /100 WBC (ref 0–0.2)
PLATELET # BLD AUTO: 155 10*3/MM3 (ref 140–450)
PMV BLD AUTO: 8.8 FL (ref 6–12)
POTASSIUM SERPL-SCNC: 3.8 MMOL/L (ref 3.5–5.2)
PROT SERPL-MCNC: 7.5 G/DL (ref 6–8.5)
RBC # BLD AUTO: 5.65 10*6/MM3 (ref 4.14–5.8)
SODIUM SERPL-SCNC: 141 MMOL/L (ref 136–145)
WBC NRBC COR # BLD: 6.75 10*3/MM3 (ref 3.4–10.8)
WHOLE BLOOD HOLD COAG: NORMAL
WHOLE BLOOD HOLD SPECIMEN: NORMAL

## 2023-09-12 PROCEDURE — 82077 ASSAY SPEC XCP UR&BREATH IA: CPT | Performed by: EMERGENCY MEDICINE

## 2023-09-12 PROCEDURE — 99284 EMERGENCY DEPT VISIT MOD MDM: CPT

## 2023-09-12 PROCEDURE — 80053 COMPREHEN METABOLIC PANEL: CPT | Performed by: EMERGENCY MEDICINE

## 2023-09-12 PROCEDURE — 36415 COLL VENOUS BLD VENIPUNCTURE: CPT

## 2023-09-12 PROCEDURE — 85025 COMPLETE CBC W/AUTO DIFF WBC: CPT

## 2023-09-12 RX ORDER — SODIUM CHLORIDE 0.9 % (FLUSH) 0.9 %
10 SYRINGE (ML) INJECTION AS NEEDED
Status: DISCONTINUED | OUTPATIENT
Start: 2023-09-12 | End: 2023-09-13 | Stop reason: HOSPADM

## 2023-09-12 NOTE — Clinical Note
[facility] EMERGENCY ROOM  [address]  [place] [address]  Phone: [phone]    Rajeev Balbuena accompanied Kurt Balbuena to the emergency department on 9/12/2023. They may return to work on 09/14/2023.        Thank you for choosing [facility].    Saul Grover, DO    
21-Feb-2023 14:43

## 2023-09-13 ENCOUNTER — APPOINTMENT (OUTPATIENT)
Dept: CT IMAGING | Facility: HOSPITAL | Age: 38
End: 2023-09-13
Payer: COMMERCIAL

## 2023-09-13 VITALS
DIASTOLIC BLOOD PRESSURE: 76 MMHG | HEIGHT: 67 IN | RESPIRATION RATE: 16 BRPM | TEMPERATURE: 98.5 F | HEART RATE: 91 BPM | BODY MASS INDEX: 43.08 KG/M2 | OXYGEN SATURATION: 90 % | SYSTOLIC BLOOD PRESSURE: 118 MMHG | WEIGHT: 274.47 LBS

## 2023-09-13 PROCEDURE — 96374 THER/PROPH/DIAG INJ IV PUSH: CPT

## 2023-09-13 PROCEDURE — 25010000002 LEVETIRACETAM IN NACL 0.82% 500 MG/100ML SOLUTION: Performed by: EMERGENCY MEDICINE

## 2023-09-13 PROCEDURE — 70450 CT HEAD/BRAIN W/O DYE: CPT

## 2023-09-13 RX ORDER — LEVETIRACETAM 5 MG/ML
500 INJECTION INTRAVASCULAR EVERY 12 HOURS SCHEDULED
Status: DISCONTINUED | OUTPATIENT
Start: 2023-09-13 | End: 2023-09-13 | Stop reason: HOSPADM

## 2023-09-13 RX ADMIN — LEVETIRACETAM 500 MG: 5 INJECTION INTRAVENOUS at 01:52

## 2023-09-13 NOTE — ED PROVIDER NOTES
Time: 11:41 PM EDT  Date of encounter:  9/12/2023  Independent Historian/Clinical History and Information was obtained by:   Patient and Family    History is limited by: N/A    Chief Complaint: seizure, fall, head injury, alcohol use      History of Present Illness:  Patient is a 38 y.o. year old male who presents to the emergency department for evaluation of a fall and seizure.  The patient states that he was drinking and thought it would be a bad idea to take his Keppra while drinking alcohol.  He had a brief seizure and hot his head and now is alert and oriented.  He hit his head but denies any other pain or injury.    HPI    Patient Care Team  Primary Care Provider: Dalila Moon APRN    Past Medical History:     Allergies   Allergen Reactions    Benadryl [Diphenhydramine] Hallucinations     Past Medical History:   Diagnosis Date    Hernia of abdominal wall     Hypertension     MI (myocardial infarction)      Past Surgical History:   Procedure Laterality Date    APPENDECTOMY       History reviewed. No pertinent family history.    Home Medications:  Prior to Admission medications    Medication Sig Start Date End Date Taking? Authorizing Provider   levETIRAcetam (KEPPRA) 500 MG tablet Take 1 tablet by mouth 2 (Two) Times a Day. 9/5/23   Bakari Swanson MD        Social History:   Social History     Tobacco Use    Smoking status: Never     Passive exposure: Never    Smokeless tobacco: Current     Types: Snuff   Vaping Use    Vaping Use: Never used   Substance Use Topics    Alcohol use: Not Currently     Comment: occ    Drug use: Never         Review of Systems:  Review of Systems   Constitutional:  Negative for chills and fever.   HENT:  Negative for congestion, ear pain and sore throat.    Eyes:  Negative for pain.   Respiratory:  Negative for cough, chest tightness and shortness of breath.    Cardiovascular:  Negative for chest pain.   Gastrointestinal:  Negative for abdominal pain, diarrhea, nausea and  "vomiting.   Genitourinary:  Negative for flank pain and hematuria.   Musculoskeletal:  Negative for joint swelling.   Skin:  Negative for pallor.   Neurological:  Positive for seizures and headaches.   All other systems reviewed and are negative.     Physical Exam:  /76   Pulse 91   Temp 98.5 °F (36.9 °C)   Resp 16   Ht 170.2 cm (67\")   Wt 124 kg (274 lb 7.6 oz)   SpO2 90%   BMI 42.99 kg/m²     Physical Exam  Vitals and nursing note reviewed.   Constitutional:       General: He is not in acute distress.     Appearance: Normal appearance. He is not toxic-appearing.   HENT:      Head: Normocephalic and atraumatic.      Mouth/Throat:      Mouth: Mucous membranes are moist.   Eyes:      General: No scleral icterus.  Cardiovascular:      Rate and Rhythm: Normal rate and regular rhythm.      Pulses: Normal pulses.      Heart sounds: Normal heart sounds.   Pulmonary:      Effort: Pulmonary effort is normal. No respiratory distress.      Breath sounds: Normal breath sounds.   Abdominal:      General: Abdomen is flat.      Palpations: Abdomen is soft.      Tenderness: There is no abdominal tenderness.   Musculoskeletal:         General: Normal range of motion.      Cervical back: Normal range of motion and neck supple.   Skin:     General: Skin is warm and dry.      Capillary Refill: Capillary refill takes less than 2 seconds.   Neurological:      General: No focal deficit present.      Mental Status: He is alert and oriented to person, place, and time. Mental status is at baseline.   Psychiatric:         Mood and Affect: Mood normal.         Behavior: Behavior normal.         Thought Content: Thought content normal.         Judgment: Judgment normal.                Procedures:  Procedures      Medical Decision Making:      Comorbidities that affect care:    seizures, Substance Abuse    External Notes reviewed:    Previous Clinic Note:  visit for otitis externa      The following orders were placed and all " results were independently analyzed by me:  Orders Placed This Encounter   Procedures    CT Head Without Contrast    [place] Draw    Comprehensive Metabolic Panel    CBC Auto Differential    Ethanol    CBC & Differential    Green Top (Gel)    Lavender Top    Gold Top - SST    Light Blue Top       Medications Given in the Emergency Department:  Medications - No data to display       ED Course:         Labs:    Lab Results (last 24 hours)       Procedure Component Value Units Date/Time    CBC & Differential [[id number]]  (Abnormal) Collected: 09/12/23 2213    Specimen: Blood Updated: 09/12/23 2227    Narrative:      The following orders were created for panel order CBC & Differential.  Procedure                               Abnormality         Status                     ---------                               -----------         ------                     CBC Auto Differential[[id number]]        Abnormal            Final result                 Please view results for these tests on the individual orders.    Comprehensive Metabolic Panel [[id number]]  (Abnormal) Collected: 09/12/23 2213    Specimen: Blood Updated: 09/12/23 2253     Glucose 105 mg/dL      BUN 5 mg/dL      Creatinine 0.91 mg/dL      Sodium 141 mmol/L      Potassium 3.8 mmol/L      Chloride 104 mmol/L      CO2 24.5 mmol/L      Calcium 9.2 mg/dL      Total Protein 7.5 g/dL      Albumin 4.3 g/dL      ALT (SGPT) 61 U/L      AST (SGOT) 75 U/L      Alkaline Phosphatase 77 U/L      Total Bilirubin 1.0 mg/dL      Globulin 3.2 gm/dL      A/G Ratio 1.3 g/dL      BUN/Creatinine Ratio 5.5     Anion Gap 12.5 mmol/L      eGFR 109.9 mL/min/1.73     Narrative:      GFR Normal >60  Chronic Kidney Disease <60  Kidney Failure <15      CBC Auto Differential [[id number]]  (Abnormal) Collected: 09/12/23 2213    Specimen: Blood Updated: 09/12/23 2227     WBC 6.75 10*3/mm3      RBC 5.65 10*6/mm3      Hemoglobin 17.5 g/dL      Hematocrit 50.4 %      MCV 89.2 fL      MCH 31.0 pg       MCHC 34.7 g/dL      RDW 13.9 %      RDW-SD 44.6 fl      MPV 8.8 fL      Platelets 155 10*3/mm3      Neutrophil % 57.2 %      Lymphocyte % 29.2 %      Monocyte % 6.5 %      Eosinophil % 4.9 %      Basophil % 1.6 %      Immature Grans % 0.6 %      Neutrophils, Absolute 3.86 10*3/mm3      Lymphocytes, Absolute 1.97 10*3/mm3      Monocytes, Absolute 0.44 10*3/mm3      Eosinophils, Absolute 0.33 10*3/mm3      Basophils, Absolute 0.11 10*3/mm3      Immature Grans, Absolute 0.04 10*3/mm3      nRBC 0.0 /100 WBC     Ethanol [[id number]]  (Abnormal) Collected: 09/12/23 2213    Specimen: Blood Updated: 09/12/23 2352     Ethanol 192 mg/dL      Ethanol % 0.192 %     Narrative:      Ethanol (Plasma)  <10 Essentially Negative    Toxic Concentrations           mg/dL    Flushing, slowing of reflexes    Impaired visual activity         Depression of CNS              >100  Possible Coma                  >300                Imaging:    CT Head Without Contrast    Result Date: 9/13/2023  PROCEDURE: CT HEAD WO CONTRAST  COMPARISON:  [facility], CT, CT HEAD WO CONTRAST, 9/05/2023, 3:38. INDICATIONS: seizure, hit head  PROTOCOL:   Standard imaging protocol performed    RADIATION:   DLP: 954.5mGy*cm   MA and/or KV was adjusted to minimize radiation dose.     TECHNIQUE: After obtaining the patient's consent, CT images were obtained without non-ionic intravenous contrast material.  FINDINGS:  There is no evidence for acute intracranial hemorrhage. No definitive acute focal ischemia is observed. There is no evidence for abnormal cerebral edema. No mass effect or midline shift is seen. The ventricular system is nondilated. The basilar cisterns are patent. The skull is intact without displaced fracture. The paranasal sinuses and mastoid air cells are clear.  There is a small hematoma in the scalp soft tissues overlying the left superior posterior margin of the skull measuring 2.8 cm.        1. No evidence for acute  intracranial abnormality. 2. A small scalp hematoma is noted measuring 2.8 cm overlying the left posterior superior margin of the skull.    PIPE FIERRO MD       Electronically Signed and Approved By: PIPE FIERRO MD on 9/13/2023 at 0:46                Differential Diagnosis and Discussion:    Seizure: Differential diagnosis includes but is not limited to meningitis, hypoglycemia, electrolyte abnormalities, intracranial hemorrhage, toxin induced, and pseudoseizure.    All labs were reviewed and interpreted by me.    MDM     Amount and/or Complexity of Data Reviewed  Clinical lab tests: reviewed  Tests in the radiology section of CPT®: reviewed             Patient Care Considerations:    MRI: I considered ordering an MRI however CT is appropriate after trauma      Consultants/Shared Management Plan:    None    Social Determinants of Health:    Patient has presented with family members who are responsible, reliable and will ensure follow up care.      Disposition and Care Coordination:    Discharged: The patient is suitable and stable for discharge with no need for consideration of observation or admission.    I have explained discharge medications and the need for follow up with the patient/caretakers. This was also printed in the discharge instructions. Patient was discharged with the following medications and follow up:      Medication List      No changes were made to your prescriptions during this visit.      Dalila Moon, APRN  [address]  [place] [address]  [phone]    In 1 day         Final diagnoses:   Seizure   Alcoholic intoxication without complication        ED Disposition       ED Disposition   Discharge    Condition   Stable    Comment   --               This medical record created using voice recognition software.             Saul Grover,   09/13/23 0078

## 2023-09-13 NOTE — DISCHARGE INSTRUCTIONS
Drink plenty of fluids.  Take your medication as directed.  Return for worsening symptoms.  Follow-up with your doctor in 2 days.

## 2023-09-14 ENCOUNTER — APPOINTMENT (OUTPATIENT)
Dept: GENERAL RADIOLOGY | Facility: HOSPITAL | Age: 38
End: 2023-09-14
Payer: COMMERCIAL

## 2023-09-14 ENCOUNTER — HOSPITAL ENCOUNTER (EMERGENCY)
Facility: HOSPITAL | Age: 38
Discharge: HOME | End: 2023-09-14
Attending: EMERGENCY MEDICINE
Payer: COMMERCIAL

## 2023-09-14 VITALS
WEIGHT: 282.63 LBS | BODY MASS INDEX: 42.83 KG/M2 | HEIGHT: 68 IN | OXYGEN SATURATION: 91 % | TEMPERATURE: 98 F | HEART RATE: 112 BPM | DIASTOLIC BLOOD PRESSURE: 66 MMHG | SYSTOLIC BLOOD PRESSURE: 103 MMHG | RESPIRATION RATE: 17 BRPM

## 2023-09-14 DIAGNOSIS — R56.9 SEIZURE: Primary | ICD-10-CM

## 2023-09-14 DIAGNOSIS — F10.10 ALCOHOL ABUSE: ICD-10-CM

## 2023-09-14 LAB
ALBUMIN SERPL-MCNC: 4.5 G/DL (ref 3.5–5.2)
ALBUMIN/GLOB SERPL: 1.2 G/DL
ALP SERPL-CCNC: 93 U/L (ref 39–117)
ALT SERPL W P-5'-P-CCNC: 69 U/L (ref 1–41)
ANION GAP SERPL CALCULATED.3IONS-SCNC: 12.1 MMOL/L (ref 5–15)
AST SERPL-CCNC: 79 U/L (ref 1–40)
BASOPHILS # BLD AUTO: 0.13 10*3/MM3 (ref 0–0.2)
BASOPHILS NFR BLD AUTO: 1.7 % (ref 0–1.5)
BILIRUB SERPL-MCNC: 1.1 MG/DL (ref 0–1.2)
BUN SERPL-MCNC: 6 MG/DL (ref 6–20)
BUN/CREAT SERPL: 6.2 (ref 7–25)
CALCIUM SPEC-SCNC: 9.5 MG/DL (ref 8.6–10.5)
CHLORIDE SERPL-SCNC: 103 MMOL/L (ref 98–107)
CO2 SERPL-SCNC: 25.9 MMOL/L (ref 22–29)
CREAT SERPL-MCNC: 0.97 MG/DL (ref 0.76–1.27)
DEPRECATED RDW RBC AUTO: 46 FL (ref 37–54)
EGFRCR SERPLBLD CKD-EPI 2021: 101.2 ML/MIN/1.73
EOSINOPHIL # BLD AUTO: 0.37 10*3/MM3 (ref 0–0.4)
EOSINOPHIL NFR BLD AUTO: 4.9 % (ref 0.3–6.2)
ERYTHROCYTE [DISTWIDTH] IN BLOOD BY AUTOMATED COUNT: 14.4 % (ref 12.3–15.4)
ETHANOL BLD-MCNC: 238 MG/DL (ref 0–10)
ETHANOL UR QL: 0.24 %
GLOBULIN UR ELPH-MCNC: 3.8 GM/DL
GLUCOSE SERPL-MCNC: 142 MG/DL (ref 65–99)
HCT VFR BLD AUTO: 53.9 % (ref 37.5–51)
HGB BLD-MCNC: 19.3 G/DL (ref 13–17.7)
HOLD SPECIMEN: NORMAL
HOLD SPECIMEN: NORMAL
IMM GRANULOCYTES # BLD AUTO: 0.03 10*3/MM3 (ref 0–0.05)
IMM GRANULOCYTES NFR BLD AUTO: 0.4 % (ref 0–0.5)
LYMPHOCYTES # BLD AUTO: 2.38 10*3/MM3 (ref 0.7–3.1)
LYMPHOCYTES NFR BLD AUTO: 31.6 % (ref 19.6–45.3)
MAGNESIUM SERPL-MCNC: 2.5 MG/DL (ref 1.6–2.6)
MCH RBC QN AUTO: 32.2 PG (ref 26.6–33)
MCHC RBC AUTO-ENTMCNC: 35.8 G/DL (ref 31.5–35.7)
MCV RBC AUTO: 90 FL (ref 79–97)
MONOCYTES # BLD AUTO: 0.38 10*3/MM3 (ref 0.1–0.9)
MONOCYTES NFR BLD AUTO: 5 % (ref 5–12)
NEUTROPHILS NFR BLD AUTO: 4.25 10*3/MM3 (ref 1.7–7)
NEUTROPHILS NFR BLD AUTO: 56.4 % (ref 42.7–76)
NRBC BLD AUTO-RTO: 0 /100 WBC (ref 0–0.2)
PLATELET # BLD AUTO: 166 10*3/MM3 (ref 140–450)
PMV BLD AUTO: 8.7 FL (ref 6–12)
POTASSIUM SERPL-SCNC: 3.8 MMOL/L (ref 3.5–5.2)
PROT SERPL-MCNC: 8.3 G/DL (ref 6–8.5)
RBC # BLD AUTO: 5.99 10*6/MM3 (ref 4.14–5.8)
SODIUM SERPL-SCNC: 141 MMOL/L (ref 136–145)
TROPONIN T SERPL HS-MCNC: <6 NG/L
WBC NRBC COR # BLD: 7.54 10*3/MM3 (ref 3.4–10.8)
WHOLE BLOOD HOLD COAG: NORMAL
WHOLE BLOOD HOLD SPECIMEN: NORMAL

## 2023-09-14 PROCEDURE — 71045 X-RAY EXAM CHEST 1 VIEW: CPT

## 2023-09-14 PROCEDURE — 82077 ASSAY SPEC XCP UR&BREATH IA: CPT | Performed by: EMERGENCY MEDICINE

## 2023-09-14 PROCEDURE — 93010 ELECTROCARDIOGRAM REPORT: CPT | Performed by: INTERNAL MEDICINE

## 2023-09-14 PROCEDURE — 85025 COMPLETE CBC W/AUTO DIFF WBC: CPT | Performed by: EMERGENCY MEDICINE

## 2023-09-14 PROCEDURE — 0 LEVETIRACETAM IN NACL 0.75% 1000 MG/100ML SOLUTION: Performed by: EMERGENCY MEDICINE

## 2023-09-14 PROCEDURE — 83735 ASSAY OF MAGNESIUM: CPT | Performed by: EMERGENCY MEDICINE

## 2023-09-14 PROCEDURE — 80053 COMPREHEN METABOLIC PANEL: CPT | Performed by: EMERGENCY MEDICINE

## 2023-09-14 PROCEDURE — 84484 ASSAY OF TROPONIN QUANT: CPT | Performed by: EMERGENCY MEDICINE

## 2023-09-14 PROCEDURE — 99284 EMERGENCY DEPT VISIT MOD MDM: CPT

## 2023-09-14 PROCEDURE — 96374 THER/PROPH/DIAG INJ IV PUSH: CPT

## 2023-09-14 PROCEDURE — 36415 COLL VENOUS BLD VENIPUNCTURE: CPT

## 2023-09-14 PROCEDURE — 93005 ELECTROCARDIOGRAM TRACING: CPT | Performed by: EMERGENCY MEDICINE

## 2023-09-14 RX ORDER — SODIUM CHLORIDE 0.9 % (FLUSH) 0.9 %
10 SYRINGE (ML) INJECTION AS NEEDED
Status: DISCONTINUED | OUTPATIENT
Start: 2023-09-14 | End: 2023-09-14 | Stop reason: HOSPADM

## 2023-09-14 RX ORDER — LEVETIRACETAM 10 MG/ML
1000 INJECTION INTRAVASCULAR ONCE
Status: COMPLETED | OUTPATIENT
Start: 2023-09-14 | End: 2023-09-14

## 2023-09-14 RX ADMIN — LEVETIRACETAM 1000 MG: 10 INJECTION, SOLUTION INTRAVENOUS at 02:06

## 2023-09-14 NOTE — Clinical Note
[facility] EMERGENCY ROOM  [address]  [place] [address]  Phone: [phone]    Kurt Balbuena was seen and treated in our emergency department on 9/14/2023.  He may return to work on 09/15/2023.         Thank you for choosing [facility].    Saul Grover, DO

## 2023-09-14 NOTE — Clinical Note
[facility] EMERGENCY ROOM  [address]  [place] [address]  Phone: [phone]    Kurt Balbuena was seen and treated in our emergency department on 9/14/2023.  He may return to work on 09/15/2023.         Thank you for choosing [facility].    Saul Grover, DO

## 2023-09-14 NOTE — DISCHARGE INSTRUCTIONS
Drink plenty fluids.  Do not drink alcohol.  Take your medications as directed.  Return for worsening symptoms.

## 2023-09-14 NOTE — Clinical Note
[facility] EMERGENCY ROOM  [address]  [place] [address]  Phone: [phone]    Rajeev Balbuena accompanied Kurt Day to the emergency department on 9/14/2023. They may return to school on 09/15/2023.          Thank you for choosing [facility].      Saul Grover, DO

## 2023-09-14 NOTE — ED PROVIDER NOTES
Time: 1:45 AM EDT  Date of encounter:  9/14/2023  Independent Historian/Clinical History and Information was obtained by:   Patient and Family    History is limited by: N/A    Chief Complaint: Seizure      History of Present Illness:  Patient is a 38 y.o. year old male who presents to the emergency department for evaluation of a seizure.  The patient was seen by myself yesterday for the same symptoms.  He stopped taking his meds because he was drinking alcohol.  He had another seizure today but did not injure himself.  He is alert and oriented and denies any new pain or injury.  He is at his neurologic baseline    HPI    Patient Care Team  Primary Care Provider: Dalila Moon APRN    Past Medical History:     Allergies   Allergen Reactions    Benadryl [Diphenhydramine] Hallucinations     Past Medical History:   Diagnosis Date    Hernia of abdominal wall     Hypertension     MI (myocardial infarction)      Past Surgical History:   Procedure Laterality Date    APPENDECTOMY       History reviewed. No pertinent family history.    Home Medications:  Prior to Admission medications    Medication Sig Start Date End Date Taking? Authorizing Provider   levETIRAcetam (KEPPRA) 500 MG tablet Take 1 tablet by mouth 2 (Two) Times a Day. 9/5/23   Bakari Swanson MD        Social History:   Social History     Tobacco Use    Smoking status: Never     Passive exposure: Never    Smokeless tobacco: Current     Types: Snuff   Vaping Use    Vaping Use: Never used   Substance Use Topics    Alcohol use: Not Currently     Comment: occ    Drug use: Never         Review of Systems:  Review of Systems   Constitutional:  Negative for chills and fever.   HENT:  Negative for congestion, ear pain and sore throat.    Eyes:  Negative for pain.   Respiratory:  Negative for cough, chest tightness and shortness of breath.    Cardiovascular:  Negative for chest pain.   Gastrointestinal:  Negative for abdominal pain, diarrhea, nausea and vomiting.  "  Genitourinary:  Negative for flank pain and hematuria.   Musculoskeletal:  Negative for joint swelling.   Skin:  Negative for pallor.   Neurological:  Positive for seizures. Negative for headaches.   All other systems reviewed and are negative.     Physical Exam:  /66 (BP Location: Right arm, Patient Position: Sitting)   Pulse 112   Temp 98 °F (36.7 °C) (Oral)   Resp 17   Ht 172.7 cm (68\")   Wt 128 kg (282 lb 10.1 oz)   SpO2 91%   BMI 42.97 kg/m²     Physical Exam  Vitals and nursing note reviewed.   Constitutional:       General: He is not in acute distress.     Appearance: Normal appearance. He is not toxic-appearing.   HENT:      Head: Normocephalic and atraumatic.      Mouth/Throat:      Mouth: Mucous membranes are moist.   Eyes:      General: No scleral icterus.  Cardiovascular:      Rate and Rhythm: Normal rate and regular rhythm.      Pulses: Normal pulses.      Heart sounds: Normal heart sounds.   Pulmonary:      Effort: Pulmonary effort is normal. No respiratory distress.      Breath sounds: Normal breath sounds.   Abdominal:      General: Abdomen is flat.      Palpations: Abdomen is soft.      Tenderness: There is no abdominal tenderness.   Musculoskeletal:         General: Normal range of motion.      Cervical back: Normal range of motion and neck supple.   Skin:     General: Skin is warm and dry.      Capillary Refill: Capillary refill takes less than 2 seconds.   Neurological:      General: No focal deficit present.      Mental Status: He is alert and oriented to person, place, and time. Mental status is at baseline.                Procedures:  Procedures      Medical Decision Making:      Comorbidities that affect care:    seizures, Substance Abuse    External Notes reviewed:    Previous Clinic Note:  visit for otitis externa      The following orders were placed and all results were independently analyzed by me:  Orders Placed This Encounter   Procedures    XR Chest 1 View    [place] " Draw    Comprehensive Metabolic Panel    Single High Sensitivity Troponin T    Magnesium    CBC Auto Differential    Ethanol    Undress & Gown    Continuous Pulse Oximetry    Vital Signs    ECG 12 Lead ED Triage Standing Order; Weak / Dizzy / AMS    CBC & Differential    Green Top (Gel)    Lavender Top    Gold Top - SST    Light Blue Top       Medications Given in the Emergency Department:  Medications   levETIRAcetam in NaCl 0.75% (KEPPRA) IVPB 1,000 mg (0 mg Intravenous Stopped 9/14/23 0231)        ED Course:         Labs:    Lab Results (last 24 hours)       Procedure Component Value Units Date/Time    CBC & Differential [[id number]]  (Abnormal) Collected: 09/14/23 0026    Specimen: Blood Updated: 09/14/23 0033    Narrative:      The following orders were created for panel order CBC & Differential.  Procedure                               Abnormality         Status                     ---------                               -----------         ------                     CBC Auto Differential[[id number]]        Abnormal            Final result                 Please view results for these tests on the individual orders.    Comprehensive Metabolic Panel [[id number]]  (Abnormal) Collected: 09/14/23 0026    Specimen: Blood Updated: 09/14/23 0055     Glucose 142 mg/dL      BUN 6 mg/dL      Creatinine 0.97 mg/dL      Sodium 141 mmol/L      Potassium 3.8 mmol/L      Comment: Slight hemolysis detected by analyzer. Results may be affected.        Chloride 103 mmol/L      CO2 25.9 mmol/L      Calcium 9.5 mg/dL      Total Protein 8.3 g/dL      Albumin 4.5 g/dL      ALT (SGPT) 69 U/L      AST (SGOT) 79 U/L      Comment: Slight hemolysis detected by analyzer. Results may be affected.        Alkaline Phosphatase 93 U/L      Total Bilirubin 1.1 mg/dL      Globulin 3.8 gm/dL      A/G Ratio 1.2 g/dL      BUN/Creatinine Ratio 6.2     Anion Gap 12.1 mmol/L      eGFR 101.2 mL/min/1.73     Narrative:      GFR Normal >60  Chronic Kidney  Disease <60  Kidney Failure <15      Single High Sensitivity Troponin T [[id number]]  (Normal) Collected: 09/14/23 0026    Specimen: Blood Updated: 09/14/23 0054     HS Troponin T <6 ng/L     Narrative:      High Sensitive Troponin T Reference Range:  <10.0 ng/L- Negative Female for AMI  <15.0 ng/L- Negative Male for AMI  >=10 - Abnormal Female indicating possible myocardial injury.  >=15 - Abnormal Male indicating possible myocardial injury.   Clinicians would have to utilize clinical acumen, EKG, Troponin, and serial changes to determine if it is an Acute Myocardial Infarction or myocardial injury due to an underlying chronic condition.         Magnesium [[id number]]  (Normal) Collected: 09/14/23 0026    Specimen: Blood Updated: 09/14/23 0055     Magnesium 2.5 mg/dL     CBC Auto Differential [[id number]]  (Abnormal) Collected: 09/14/23 0026    Specimen: Blood Updated: 09/14/23 0033     WBC 7.54 10*3/mm3      RBC 5.99 10*6/mm3      Hemoglobin 19.3 g/dL      Hematocrit 53.9 %      MCV 90.0 fL      MCH 32.2 pg      MCHC 35.8 g/dL      RDW 14.4 %      RDW-SD 46.0 fl      MPV 8.7 fL      Platelets 166 10*3/mm3      Neutrophil % 56.4 %      Lymphocyte % 31.6 %      Monocyte % 5.0 %      Eosinophil % 4.9 %      Basophil % 1.7 %      Immature Grans % 0.4 %      Neutrophils, Absolute 4.25 10*3/mm3      Lymphocytes, Absolute 2.38 10*3/mm3      Monocytes, Absolute 0.38 10*3/mm3      Eosinophils, Absolute 0.37 10*3/mm3      Basophils, Absolute 0.13 10*3/mm3      Immature Grans, Absolute 0.03 10*3/mm3      nRBC 0.0 /100 WBC     Ethanol [[id number]]  (Abnormal) Collected: 09/14/23 0026    Specimen: Blood Updated: 09/14/23 0206     Ethanol 238 mg/dL      Ethanol % 0.238 %     Narrative:      Ethanol (Plasma)  <10 Essentially Negative    Toxic Concentrations           mg/dL    Flushing, slowing of reflexes    Impaired visual activity         Depression of CNS              >100  Possible Coma                  >300                 Imaging:    XR Chest 1 View    Result Date: 9/14/2023  PROCEDURE: XR CHEST 1 VW  COMPARISON: [facility], CR, XR CHEST 1 VW, 6/15/2023, 22:41.  INDICATIONS: Weak/Dizzy/AMS  FINDINGS:  No new consolidations or pleural effusions are observed. The cardiac silhouette and mediastinum are unchanged. No definitive acute osseous abnormalities are seen on this single view.        1. No change from the previous study with no evidence for acute cardiopulmonary process.         PIPE FIERRO MD       Electronically Signed and Approved By: PIPE FIERRO MD on 9/14/2023 at 0:45                Differential Diagnosis and Discussion:    Seizure: Differential diagnosis includes but is not limited to meningitis, hypoglycemia, electrolyte abnormalities, intracranial hemorrhage, toxin induced, and pseudoseizure.    All labs were reviewed and interpreted by me.    MDM           Patient Care Considerations:    CT HEAD: I considered ordering a noncontrast CT of the head, however the patient had a CT yesterday in the ED      Consultants/Shared Management Plan:    None    Social Determinants of Health:    Patient has presented with family members who are responsible, reliable and will ensure follow up care.      Disposition and Care Coordination:    Discharged: The patient is suitable and stable for discharge with no need for consideration of observation or admission.    I have explained discharge medications and the need for follow up with the patient/caretakers. This was also printed in the discharge instructions. Patient was discharged with the following medications and follow up:      Medication List      No changes were made to your prescriptions during this visit.      Dalila Moon, APRN  [address]  [place] [address]  [phone]    In 1 day         Final diagnoses:   Seizure   Alcohol abuse        ED Disposition       ED Disposition   Discharge    Condition   Stable    Comment   --                This medical record created using voice recognition software.             Saul Grover, DO  09/14/23 0229

## 2023-09-15 ENCOUNTER — HOSPITAL ENCOUNTER (EMERGENCY)
Facility: HOSPITAL | Age: 38
Discharge: HOME | DRG: 101 | End: 2023-09-16
Attending: EMERGENCY MEDICINE
Payer: COMMERCIAL

## 2023-09-15 DIAGNOSIS — R56.9 SEIZURE SECONDARY TO SUBTHERAPEUTIC ANTICONVULSANT MEDICATION: Primary | ICD-10-CM

## 2023-09-15 DIAGNOSIS — F10.920 ALCOHOLIC INTOXICATION WITHOUT COMPLICATION: ICD-10-CM

## 2023-09-15 DIAGNOSIS — Z79.899 SEIZURE SECONDARY TO SUBTHERAPEUTIC ANTICONVULSANT MEDICATION: Primary | ICD-10-CM

## 2023-09-15 LAB
ETHANOL BLD-MCNC: 195 MG/DL (ref 0–10)
ETHANOL UR QL: 0.2 %

## 2023-09-15 PROCEDURE — 0 LEVETIRACETAM IN NACL 0.54% 1500 MG/100ML SOLUTION: Performed by: EMERGENCY MEDICINE

## 2023-09-15 PROCEDURE — 96374 THER/PROPH/DIAG INJ IV PUSH: CPT

## 2023-09-15 PROCEDURE — 80053 COMPREHEN METABOLIC PANEL: CPT | Performed by: EMERGENCY MEDICINE

## 2023-09-15 PROCEDURE — 82077 ASSAY SPEC XCP UR&BREATH IA: CPT | Performed by: EMERGENCY MEDICINE

## 2023-09-15 PROCEDURE — 99283 EMERGENCY DEPT VISIT LOW MDM: CPT

## 2023-09-15 RX ORDER — SODIUM CHLORIDE 0.9 % (FLUSH) 0.9 %
10 SYRINGE (ML) INJECTION AS NEEDED
Status: DISCONTINUED | OUTPATIENT
Start: 2023-09-15 | End: 2023-09-16 | Stop reason: HOSPADM

## 2023-09-15 RX ORDER — LEVETIRACETAM 15 MG/ML
1500 INJECTION INTRAVASCULAR ONCE
Status: COMPLETED | OUTPATIENT
Start: 2023-09-16 | End: 2023-09-16

## 2023-09-15 RX ADMIN — LEVETIRACETAM 1500 MG: 15 INJECTION, SOLUTION INTRAVENOUS at 23:49

## 2023-09-16 VITALS
OXYGEN SATURATION: 95 % | TEMPERATURE: 98.2 F | HEIGHT: 68 IN | RESPIRATION RATE: 18 BRPM | HEART RATE: 101 BPM | DIASTOLIC BLOOD PRESSURE: 78 MMHG | BODY MASS INDEX: 42.83 KG/M2 | WEIGHT: 282.63 LBS | SYSTOLIC BLOOD PRESSURE: 131 MMHG

## 2023-09-16 LAB
ALBUMIN SERPL-MCNC: 4.1 G/DL (ref 3.5–5.2)
ALBUMIN/GLOB SERPL: 1.4 G/DL
ALP SERPL-CCNC: 78 U/L (ref 39–117)
ALT SERPL W P-5'-P-CCNC: 45 U/L (ref 1–41)
ANION GAP SERPL CALCULATED.3IONS-SCNC: 13.5 MMOL/L (ref 5–15)
AST SERPL-CCNC: 45 U/L (ref 1–40)
BILIRUB SERPL-MCNC: 0.9 MG/DL (ref 0–1.2)
BUN SERPL-MCNC: 7 MG/DL (ref 6–20)
BUN/CREAT SERPL: 7.3 (ref 7–25)
CALCIUM SPEC-SCNC: 9.7 MG/DL (ref 8.6–10.5)
CHLORIDE SERPL-SCNC: 102 MMOL/L (ref 98–107)
CO2 SERPL-SCNC: 25.5 MMOL/L (ref 22–29)
CREAT SERPL-MCNC: 0.96 MG/DL (ref 0.76–1.27)
EGFRCR SERPLBLD CKD-EPI 2021: 102.5 ML/MIN/1.73
GLOBULIN UR ELPH-MCNC: 3 GM/DL
GLUCOSE SERPL-MCNC: 89 MG/DL (ref 65–99)
HOLD SPECIMEN: NORMAL
HOLD SPECIMEN: NORMAL
POTASSIUM SERPL-SCNC: 3.9 MMOL/L (ref 3.5–5.2)
PROT SERPL-MCNC: 7.1 G/DL (ref 6–8.5)
SODIUM SERPL-SCNC: 141 MMOL/L (ref 136–145)
WHOLE BLOOD HOLD COAG: NORMAL
WHOLE BLOOD HOLD SPECIMEN: NORMAL

## 2023-09-16 NOTE — ED PROVIDER NOTES
Time: 11:25 PM EDT  Date of encounter:  9/15/2023  Independent Historian/Clinical History and Information was obtained by:   Patient and Family    History is limited by: N/A    Chief Complaint: Seizure      History of Present Illness:  Patient is a 38 y.o. year old male who presents to the emergency department for evaluation of seizure    Patient and family witnessed approximately 3 to 4 minutes of seizure-like tonic-clonic activity prior to arrival.  The patient was at his mother-in-law's house assisting with chores and playing in the yard with the children.  He had had several drinks.  He missed his evening dose of Keppra.  He was seen in the emergency department recently and had his Keppra dose increased but he has not yet started at the elevated dose.    HPI    Patient Care Team  Primary Care Provider: Dalila Moon APRN    Past Medical History:     Allergies   Allergen Reactions    Benadryl [Diphenhydramine] Hallucinations     Past Medical History:   Diagnosis Date    Hernia of abdominal wall     Hypertension     MI (myocardial infarction)      Past Surgical History:   Procedure Laterality Date    APPENDECTOMY       History reviewed. No pertinent family history.    Home Medications:  Prior to Admission medications    Medication Sig Start Date End Date Taking? Authorizing Provider   levETIRAcetam (KEPPRA) 500 MG tablet Take 1 tablet by mouth 2 (Two) Times a Day. 9/5/23  Yes Bakari Swanson MD        Social History:   Social History     Tobacco Use    Smoking status: Never     Passive exposure: Never    Smokeless tobacco: Current     Types: Snuff   Vaping Use    Vaping Use: Never used   Substance Use Topics    Alcohol use: Not Currently     Comment: occ    Drug use: Never         Review of Systems:  Review of Systems   Constitutional:  Negative for chills and fever.   HENT:  Negative for congestion, ear pain and sore throat.    Eyes:  Negative for pain.   Respiratory:  Negative for cough, chest tightness  "and shortness of breath.    Cardiovascular:  Negative for chest pain.   Gastrointestinal:  Negative for abdominal pain, diarrhea, nausea and vomiting.   Genitourinary:  Negative for flank pain and hematuria.   Musculoskeletal:  Negative for joint swelling.   Skin:  Negative for pallor.   Neurological:  Positive for seizures. Negative for headaches.   All other systems reviewed and are negative.     Physical Exam:  /78 (BP Location: Left arm, Patient Position: Lying)   Pulse 101   Temp 98.2 °F (36.8 °C) (Oral)   Resp 18   Ht 172.7 cm (68\")   Wt 128 kg (282 lb 10.1 oz)   SpO2 95%   BMI 42.97 kg/m²     Physical Exam  Vitals and nursing note reviewed.   Constitutional:       General: He is not in acute distress.     Appearance: Normal appearance. He is not toxic-appearing.   HENT:      Head: Normocephalic and atraumatic.      Jaw: There is normal jaw occlusion.   Eyes:      General: Lids are normal.      Extraocular Movements: Extraocular movements intact.      Conjunctiva/sclera: Conjunctivae normal.      Pupils: Pupils are equal, round, and reactive to light.   Cardiovascular:      Rate and Rhythm: Normal rate and regular rhythm.      Pulses: Normal pulses.      Heart sounds: Normal heart sounds.   Pulmonary:      Effort: Pulmonary effort is normal. No respiratory distress.      Breath sounds: Normal breath sounds. No wheezing or rhonchi.   Abdominal:      General: Abdomen is flat.      Palpations: Abdomen is soft.      Tenderness: There is no abdominal tenderness. There is no guarding or rebound.   Musculoskeletal:         General: Normal range of motion.      Cervical back: Normal range of motion and neck supple.      Right lower leg: No edema.      Left lower leg: No edema.   Skin:     General: Skin is warm and dry.   Neurological:      Mental Status: He is alert and oriented to person, place, and time. Mental status is at baseline.   Psychiatric:         Mood and Affect: Mood normal.        "       Procedures:  Procedures      Medical Decision Making:      Comorbidities that affect care:    Seizure, alcohol use    External Notes reviewed:    Previous ED Note: Multiple recent ED visits for seizures with noncompliance alcohol use as contributing factors.      The following orders were placed and all results were independently analyzed by me:  Orders Placed This Encounter   Procedures    [place] Draw    Comprehensive Metabolic Panel    Ethanol    Green Top (Gel)    Lavender Top    Gold Top - SST    Light Blue Top       Medications Given in the Emergency Department:  Medications   levETIRAcetam in NaCl 0.54% (KEPPRA) IVPB 1,500 mg (0 mg Intravenous Stopped 9/16/23 0005)        ED Course:         Labs:    Lab Results (last 24 hours)       Procedure Component Value Units Date/Time    Comprehensive Metabolic Panel [[id number]]  (Abnormal) Collected: 09/15/23 2254    Specimen: Blood Updated: 09/16/23 0016     Glucose 89 mg/dL      BUN 7 mg/dL      Creatinine 0.96 mg/dL      Sodium 141 mmol/L      Potassium 3.9 mmol/L      Comment: Slight hemolysis detected by analyzer. Results may be affected.        Chloride 102 mmol/L      CO2 25.5 mmol/L      Calcium 9.7 mg/dL      Total Protein 7.1 g/dL      Albumin 4.1 g/dL      ALT (SGPT) 45 U/L      AST (SGOT) 45 U/L      Comment: Slight hemolysis detected by analyzer. Results may be affected.        Alkaline Phosphatase 78 U/L      Total Bilirubin 0.9 mg/dL      Globulin 3.0 gm/dL      A/G Ratio 1.4 g/dL      BUN/Creatinine Ratio 7.3     Anion Gap 13.5 mmol/L      eGFR 102.5 mL/min/1.73     Narrative:      GFR Normal >60  Chronic Kidney Disease <60  Kidney Failure <15      Ethanol [[id number]]  (Abnormal) Collected: 09/15/23 2254    Specimen: Blood Updated: 09/15/23 2349     Ethanol 195 mg/dL      Ethanol % 0.195 %     Narrative:      Ethanol (Plasma)  <10 Essentially Negative    Toxic Concentrations           mg/dL    Flushing, slowing of reflexes    Impaired visual  activity         Depression of CNS              >100  Possible Coma                  >300                Imaging:    No Radiology Exams Resulted Within Past 24 Hours      Differential Diagnosis and Discussion:    Seizure: Differential diagnosis includes but is not limited to meningitis, hypoglycemia, electrolyte abnormalities, intracranial hemorrhage, toxin induced, and pseudoseizure.    All labs were reviewed and interpreted by me.    [facility]           Patient Care Considerations:    CT HEAD: I considered ordering a noncontrast CT of the head, however the patient is at baseline and has a normal nonfocal neurologic exam with known seizure disorder and recent unremarkable CTs.      Consultants/Shared Management Plan:    None    Social Determinants of Health:    Patient has presented with family members who are responsible, reliable and will ensure follow up care.      Disposition and Care Coordination:    Discharged: The patient is suitable and stable for discharge with no need for consideration of observation or admission.    I have explained the patient´s condition, diagnoses and treatment plan based on the information available to me at this time. I have answered questions and addressed any concerns. The patient has a good  understanding of the patient´s diagnosis, condition, and treatment plan as can be expected at this point. The vital signs have been stable. The patient´s condition is stable and appropriate for discharge from the emergency department.      The patient will pursue further outpatient evaluation with the primary care physician or other designated or consulting physician as outlined in the discharge instructions. They are agreeable to this plan of care and follow-up instructions have been explained in detail. The patient has received these instructions in written format and have expressed an understanding of the discharge instructions. The patient is aware that any significant change in condition  or worsening of symptoms should prompt an immediate return to this or the closest emergency department or call to 911.  I have explained discharge medications and the need for follow up with the patient/caretakers. This was also printed in the discharge instructions. Patient was discharged with the following medications and follow up:      Medication List      No changes were made to your prescriptions during this visit.      Dalila Moon, APRN  [address]  [address]  [place] [address]  [phone]    Schedule an appointment as soon as possible for a visit          Final diagnoses:   Seizure secondary to subtherapeutic anticonvulsant medication   Alcoholic intoxication without complication        ED Disposition       ED Disposition   Discharge    Condition   Stable    Comment   --               This medical record created using voice recognition software.             Lm Ruth MD  09/16/23 0767

## 2023-09-16 NOTE — DISCHARGE INSTRUCTIONS
Please stop drinking alcohol it is contributing to your seizures.    Please be sure to take your Keppra twice a day.    The patient is advised that the [place] state law states no driving until seizure free and compliant for 90 days or greater from the date of the last seizure.     It is my medical recommendation that the patient not place themselves in any situation wherein loss of consciousness could cause harm to themselves or others. This includes, but is not limited to, working at heights, working around flame and heat (ie cooking, campfire, welding), working with or around machinery, swimming without supervision, working with firearms and hunting equipment, and bathing without supervision.

## 2023-09-17 ENCOUNTER — HOSPITAL ENCOUNTER (EMERGENCY)
Facility: HOSPITAL | Age: 38
Discharge: LEFT BEFORE DISCHARGE | DRG: 101 | End: 2023-09-18
Attending: EMERGENCY MEDICINE | Admitting: EMERGENCY MEDICINE
Payer: COMMERCIAL

## 2023-09-17 ENCOUNTER — APPOINTMENT (OUTPATIENT)
Dept: GENERAL RADIOLOGY | Facility: HOSPITAL | Age: 38
DRG: 101 | End: 2023-09-17
Payer: COMMERCIAL

## 2023-09-17 DIAGNOSIS — G40.909 RECURRENT SEIZURES: Primary | ICD-10-CM

## 2023-09-17 DIAGNOSIS — F10.920 ALCOHOLIC INTOXICATION WITHOUT COMPLICATION: ICD-10-CM

## 2023-09-17 LAB
ALBUMIN SERPL-MCNC: 4.5 G/DL (ref 3.5–5.2)
ALBUMIN/GLOB SERPL: 1.5 G/DL
ALP SERPL-CCNC: 81 U/L (ref 39–117)
ALT SERPL W P-5'-P-CCNC: 70 U/L (ref 1–41)
AMPHET+METHAMPHET UR QL: NEGATIVE
ANION GAP SERPL CALCULATED.3IONS-SCNC: 11.9 MMOL/L (ref 5–15)
AST SERPL-CCNC: 75 U/L (ref 1–40)
BARBITURATES UR QL SCN: NEGATIVE
BASOPHILS # BLD AUTO: 0.1 10*3/MM3 (ref 0–0.2)
BASOPHILS NFR BLD AUTO: 1.4 % (ref 0–1.5)
BENZODIAZ UR QL SCN: NEGATIVE
BILIRUB SERPL-MCNC: 1 MG/DL (ref 0–1.2)
BILIRUB UR QL STRIP: NEGATIVE
BUN SERPL-MCNC: 5 MG/DL (ref 6–20)
BUN/CREAT SERPL: 5.3 (ref 7–25)
CALCIUM SPEC-SCNC: 9.6 MG/DL (ref 8.6–10.5)
CANNABINOIDS SERPL QL: NEGATIVE
CHLORIDE SERPL-SCNC: 105 MMOL/L (ref 98–107)
CLARITY UR: CLEAR
CO2 SERPL-SCNC: 26.1 MMOL/L (ref 22–29)
COCAINE UR QL: NEGATIVE
COLOR UR: YELLOW
CREAT SERPL-MCNC: 0.94 MG/DL (ref 0.76–1.27)
DEPRECATED RDW RBC AUTO: 47.5 FL (ref 37–54)
EGFRCR SERPLBLD CKD-EPI 2021: 105.1 ML/MIN/1.73
EOSINOPHIL # BLD AUTO: 0.34 10*3/MM3 (ref 0–0.4)
EOSINOPHIL NFR BLD AUTO: 4.8 % (ref 0.3–6.2)
ERYTHROCYTE [DISTWIDTH] IN BLOOD BY AUTOMATED COUNT: 14.8 % (ref 12.3–15.4)
ETHANOL BLD-MCNC: 230 MG/DL (ref 0–10)
ETHANOL UR QL: 0.23 %
FENTANYL UR-MCNC: NEGATIVE NG/ML
GLOBULIN UR ELPH-MCNC: 3.1 GM/DL
GLUCOSE SERPL-MCNC: 118 MG/DL (ref 65–99)
GLUCOSE UR STRIP-MCNC: NEGATIVE MG/DL
HCT VFR BLD AUTO: 52.6 % (ref 37.5–51)
HGB BLD-MCNC: 18.7 G/DL (ref 13–17.7)
HGB UR QL STRIP.AUTO: NEGATIVE
HOLD SPECIMEN: NORMAL
HOLD SPECIMEN: NORMAL
IMM GRANULOCYTES # BLD AUTO: 0.02 10*3/MM3 (ref 0–0.05)
IMM GRANULOCYTES NFR BLD AUTO: 0.3 % (ref 0–0.5)
KETONES UR QL STRIP: NEGATIVE
LEUKOCYTE ESTERASE UR QL STRIP.AUTO: NEGATIVE
LYMPHOCYTES # BLD AUTO: 2.09 10*3/MM3 (ref 0.7–3.1)
LYMPHOCYTES NFR BLD AUTO: 29.4 % (ref 19.6–45.3)
MAGNESIUM SERPL-MCNC: 2.5 MG/DL (ref 1.6–2.6)
MCH RBC QN AUTO: 32.1 PG (ref 26.6–33)
MCHC RBC AUTO-ENTMCNC: 35.6 G/DL (ref 31.5–35.7)
MCV RBC AUTO: 90.4 FL (ref 79–97)
METHADONE UR QL SCN: NEGATIVE
MONOCYTES # BLD AUTO: 0.53 10*3/MM3 (ref 0.1–0.9)
MONOCYTES NFR BLD AUTO: 7.4 % (ref 5–12)
NEUTROPHILS NFR BLD AUTO: 4.04 10*3/MM3 (ref 1.7–7)
NEUTROPHILS NFR BLD AUTO: 56.7 % (ref 42.7–76)
NITRITE UR QL STRIP: NEGATIVE
NRBC BLD AUTO-RTO: 0 /100 WBC (ref 0–0.2)
OPIATES UR QL: NEGATIVE
OXYCODONE UR QL SCN: NEGATIVE
PH UR STRIP.AUTO: 6 [PH] (ref 5–8)
PHENYTOIN SERPL-MCNC: <0.8 MCG/ML (ref 10–20)
PLATELET # BLD AUTO: 150 10*3/MM3 (ref 140–450)
PMV BLD AUTO: 8.8 FL (ref 6–12)
POTASSIUM SERPL-SCNC: 3.8 MMOL/L (ref 3.5–5.2)
PROT SERPL-MCNC: 7.6 G/DL (ref 6–8.5)
PROT UR QL STRIP: NEGATIVE
QT INTERVAL: 319 MS
QTC INTERVAL: 431 MS
RBC # BLD AUTO: 5.82 10*6/MM3 (ref 4.14–5.8)
SODIUM SERPL-SCNC: 143 MMOL/L (ref 136–145)
SP GR UR STRIP: 1.01 (ref 1–1.03)
TROPONIN T SERPL HS-MCNC: <6 NG/L
UROBILINOGEN UR QL STRIP: NORMAL
WBC NRBC COR # BLD: 7.12 10*3/MM3 (ref 3.4–10.8)
WHOLE BLOOD HOLD COAG: NORMAL
WHOLE BLOOD HOLD SPECIMEN: NORMAL

## 2023-09-17 PROCEDURE — 80307 DRUG TEST PRSMV CHEM ANLYZR: CPT | Performed by: EMERGENCY MEDICINE

## 2023-09-17 PROCEDURE — 99284 EMERGENCY DEPT VISIT MOD MDM: CPT

## 2023-09-17 PROCEDURE — 84484 ASSAY OF TROPONIN QUANT: CPT

## 2023-09-17 PROCEDURE — 83735 ASSAY OF MAGNESIUM: CPT

## 2023-09-17 PROCEDURE — 85610 PROTHROMBIN TIME: CPT | Performed by: EMERGENCY MEDICINE

## 2023-09-17 PROCEDURE — 80053 COMPREHEN METABOLIC PANEL: CPT

## 2023-09-17 PROCEDURE — 81003 URINALYSIS AUTO W/O SCOPE: CPT

## 2023-09-17 PROCEDURE — 93005 ELECTROCARDIOGRAM TRACING: CPT

## 2023-09-17 PROCEDURE — 93010 ELECTROCARDIOGRAM REPORT: CPT | Performed by: INTERNAL MEDICINE

## 2023-09-17 PROCEDURE — 93005 ELECTROCARDIOGRAM TRACING: CPT | Performed by: EMERGENCY MEDICINE

## 2023-09-17 PROCEDURE — 80185 ASSAY OF PHENYTOIN TOTAL: CPT

## 2023-09-17 PROCEDURE — 71045 X-RAY EXAM CHEST 1 VIEW: CPT

## 2023-09-17 PROCEDURE — 82077 ASSAY SPEC XCP UR&BREATH IA: CPT | Performed by: EMERGENCY MEDICINE

## 2023-09-17 PROCEDURE — 85025 COMPLETE CBC W/AUTO DIFF WBC: CPT

## 2023-09-17 RX ORDER — SODIUM CHLORIDE 0.9 % (FLUSH) 0.9 %
10 SYRINGE (ML) INJECTION AS NEEDED
Status: DISCONTINUED | OUTPATIENT
Start: 2023-09-17 | End: 2023-09-18 | Stop reason: HOSPADM

## 2023-09-18 ENCOUNTER — APPOINTMENT (OUTPATIENT)
Dept: GENERAL RADIOLOGY | Facility: HOSPITAL | Age: 38
DRG: 101 | End: 2023-09-18
Payer: COMMERCIAL

## 2023-09-18 ENCOUNTER — HOSPITAL ENCOUNTER (INPATIENT)
Facility: HOSPITAL | Age: 38
LOS: 1 days | Discharge: HOME | DRG: 101 | End: 2023-09-19
Attending: EMERGENCY MEDICINE | Admitting: FAMILY MEDICINE
Payer: COMMERCIAL

## 2023-09-18 VITALS
WEIGHT: 282.19 LBS | HEART RATE: 95 BPM | TEMPERATURE: 98.3 F | HEIGHT: 68 IN | OXYGEN SATURATION: 93 % | SYSTOLIC BLOOD PRESSURE: 108 MMHG | RESPIRATION RATE: 19 BRPM | BODY MASS INDEX: 42.77 KG/M2 | DIASTOLIC BLOOD PRESSURE: 73 MMHG

## 2023-09-18 DIAGNOSIS — G40.909 RECURRENT SEIZURES: Primary | ICD-10-CM

## 2023-09-18 DIAGNOSIS — F10.929 ALCOHOLIC INTOXICATION WITH COMPLICATION: ICD-10-CM

## 2023-09-18 LAB
AMMONIA BLD-SCNC: 33 UMOL/L (ref 16–60)
BASOPHILS # BLD AUTO: 0.1 10*3/MM3 (ref 0–0.2)
BASOPHILS NFR BLD AUTO: 1.5 % (ref 0–1.5)
BILIRUB UR QL STRIP: NEGATIVE
CLARITY UR: ABNORMAL
COLOR UR: YELLOW
DEPRECATED RDW RBC AUTO: 47.8 FL (ref 37–54)
EOSINOPHIL # BLD AUTO: 0.37 10*3/MM3 (ref 0–0.4)
EOSINOPHIL NFR BLD AUTO: 5.4 % (ref 0.3–6.2)
ERYTHROCYTE [DISTWIDTH] IN BLOOD BY AUTOMATED COUNT: 14.6 % (ref 12.3–15.4)
GLUCOSE BLDC GLUCOMTR-MCNC: 115 MG/DL (ref 70–99)
GLUCOSE UR STRIP-MCNC: NEGATIVE MG/DL
HCT VFR BLD AUTO: 49.5 % (ref 37.5–51)
HGB BLD-MCNC: 17.5 G/DL (ref 13–17.7)
HGB UR QL STRIP.AUTO: NEGATIVE
HOLD SPECIMEN: NORMAL
HOLD SPECIMEN: NORMAL
IMM GRANULOCYTES # BLD AUTO: 0.02 10*3/MM3 (ref 0–0.05)
IMM GRANULOCYTES NFR BLD AUTO: 0.3 % (ref 0–0.5)
INR PPP: 1.11 (ref 0.86–1.15)
KETONES UR QL STRIP: NEGATIVE
LEUKOCYTE ESTERASE UR QL STRIP.AUTO: NEGATIVE
LYMPHOCYTES # BLD AUTO: 2.02 10*3/MM3 (ref 0.7–3.1)
LYMPHOCYTES NFR BLD AUTO: 29.5 % (ref 19.6–45.3)
MCH RBC QN AUTO: 32 PG (ref 26.6–33)
MCHC RBC AUTO-ENTMCNC: 35.4 G/DL (ref 31.5–35.7)
MCV RBC AUTO: 90.5 FL (ref 79–97)
MONOCYTES # BLD AUTO: 0.69 10*3/MM3 (ref 0.1–0.9)
MONOCYTES NFR BLD AUTO: 10.1 % (ref 5–12)
NEUTROPHILS NFR BLD AUTO: 3.64 10*3/MM3 (ref 1.7–7)
NEUTROPHILS NFR BLD AUTO: 53.2 % (ref 42.7–76)
NITRITE UR QL STRIP: NEGATIVE
NRBC BLD AUTO-RTO: 0 /100 WBC (ref 0–0.2)
PH UR STRIP.AUTO: <=5 [PH] (ref 5–8)
PLATELET # BLD AUTO: 142 10*3/MM3 (ref 140–450)
PMV BLD AUTO: 8.8 FL (ref 6–12)
PROT UR QL STRIP: NEGATIVE
PROTHROMBIN TIME: 14.4 SECONDS (ref 11.8–14.9)
RBC # BLD AUTO: 5.47 10*6/MM3 (ref 4.14–5.8)
SP GR UR STRIP: <=1.005 (ref 1–1.03)
UROBILINOGEN UR QL STRIP: ABNORMAL
WBC NRBC COR # BLD: 6.84 10*3/MM3 (ref 3.4–10.8)
WHOLE BLOOD HOLD COAG: NORMAL
WHOLE BLOOD HOLD SPECIMEN: NORMAL

## 2023-09-18 PROCEDURE — 93005 ELECTROCARDIOGRAM TRACING: CPT | Performed by: EMERGENCY MEDICINE

## 2023-09-18 PROCEDURE — 80307 DRUG TEST PRSMV CHEM ANLYZR: CPT | Performed by: EMERGENCY MEDICINE

## 2023-09-18 PROCEDURE — 81003 URINALYSIS AUTO W/O SCOPE: CPT

## 2023-09-18 PROCEDURE — 82140 ASSAY OF AMMONIA: CPT | Performed by: EMERGENCY MEDICINE

## 2023-09-18 PROCEDURE — 85025 COMPLETE CBC W/AUTO DIFF WBC: CPT

## 2023-09-18 PROCEDURE — 80177 DRUG SCRN QUAN LEVETIRACETAM: CPT | Performed by: EMERGENCY MEDICINE

## 2023-09-18 PROCEDURE — 71045 X-RAY EXAM CHEST 1 VIEW: CPT

## 2023-09-18 PROCEDURE — 0 LEVETIRACETAM IN NACL 0.75% 1000 MG/100ML SOLUTION: Performed by: EMERGENCY MEDICINE

## 2023-09-18 PROCEDURE — 99285 EMERGENCY DEPT VISIT HI MDM: CPT

## 2023-09-18 PROCEDURE — 93005 ELECTROCARDIOGRAM TRACING: CPT

## 2023-09-18 PROCEDURE — 82948 REAGENT STRIP/BLOOD GLUCOSE: CPT

## 2023-09-18 PROCEDURE — 96374 THER/PROPH/DIAG INJ IV PUSH: CPT

## 2023-09-18 PROCEDURE — 93010 ELECTROCARDIOGRAM REPORT: CPT | Performed by: INTERNAL MEDICINE

## 2023-09-18 RX ORDER — LEVETIRACETAM 1000 MG/1
1000 TABLET ORAL 2 TIMES DAILY
Qty: 60 TABLET | Refills: 0 | Status: ON HOLD | OUTPATIENT
Start: 2023-09-18 | End: 2023-09-19

## 2023-09-18 RX ORDER — LEVETIRACETAM 10 MG/ML
1000 INJECTION INTRAVASCULAR ONCE
Status: COMPLETED | OUTPATIENT
Start: 2023-09-18 | End: 2023-09-18

## 2023-09-18 RX ORDER — SODIUM CHLORIDE 0.9 % (FLUSH) 0.9 %
10 SYRINGE (ML) INJECTION AS NEEDED
Status: DISCONTINUED | OUTPATIENT
Start: 2023-09-18 | End: 2023-09-19 | Stop reason: HOSPADM

## 2023-09-18 RX ADMIN — LEVETIRACETAM 1000 MG: 10 INJECTION INTRAVENOUS at 02:58

## 2023-09-18 RX ADMIN — Medication 10 ML: at 01:19

## 2023-09-18 NOTE — ED NOTES
PT ARRIVED VIA EMS WITH REPORTED SEIZURE LASTING APPROX 10 MINUTES, PT REPORTS WEAKNESS, PT FELL AND ROLLED DOWN A HILL, REPORTS NO PAIN OR INJURY.

## 2023-09-18 NOTE — ED NOTES
PT WAS COMPLAINING OF THE CERIBELL HURTING HIS HEAD AND WANTED IT TAKEN OFF, UNABLE TO CAPTURE SEIZURE BURDEN BECAUSE THE CERIBELL FROZE, HAD ANOTHER RN ZEYNEP JOHN TO VERIFY THE DEVICE WAS FROZEN AND UNABLE TO CAPTURE SEIZURE BURDEN. NOTIFIED MD SUNSHINE. PT REFUSED TO HAVE IT PUT BACK ON TO [facility].

## 2023-09-18 NOTE — DISCHARGE INSTRUCTIONS
Please stop drinking alcohol    Please return to the emergency room if you change your mind and want to be evaluated for your seizures in the hospital    Please return to emergency room for recurrent seizure, headache, vomiting, altered mental status, chest pain, chest pressure, near passing out, passing out or any new symptoms you are concerned with    You cannot drive or perform any dangerous activity until released by your neurologist

## 2023-09-18 NOTE — ED PROVIDER NOTES
Time: 9:38 PM EDTN/A  Date of encounter:  9/17/2023  Independent Historian/Clinical History and Information was obtained by:   Patient and Family    History is limited by: N/A    Chief Complaint   Patient presents with    Seizures    Fall    Weakness - Generalized    Dizziness         History of Present Illness:  Patient is a 38 y.o. year old male who presents to the emergency department for evaluation of seizure.  The wife states this is his seventh visit to the emergency room this month for seizure.  Prior to August he has had no history of seizures.  The wife states that he first became acutely confused and was not making any sense.  He walked outside.  The wife states that he had a witnessed seizure.  The patient's wife states he then came to continue to be confused and then had another seizure.  She states this lasted for approximately 10 minutes.  She states he was confused until arrival in the emergency room.  Patient has no history of traumatic brain injury.  The wife states that he has no history of seizures until this month..  The patient denies a headache.  Patient has no chest pain, chest pressure or shortness of breath.  The patient has no abdominal pain.  The patient denies any injury.  Patient denies any tongue injury or loss of bladder or bowel function.  He does note that he drinks 2 what he states is tall beers a day.  He does not consider him self and alcoholic.  He does state that he drank more today.  He had his normal amount of beers plus that a couple hard liquor shots    HPI    Patient Care Team  Primary Care Provider: Dalila Moon APRN    Past Medical History:     Allergies   Allergen Reactions    Benadryl [Diphenhydramine] Hallucinations     Past Medical History:   Diagnosis Date    Hernia of abdominal wall     Hypertension     MI (myocardial infarction)     Seizures      Past Surgical History:   Procedure Laterality Date    APPENDECTOMY       History reviewed. No pertinent family  "history.    Home Medications:  Prior to Admission medications    Medication Sig Start Date End Date Taking? Authorizing Provider   levETIRAcetam (KEPPRA) 500 MG tablet Take 1 tablet by mouth 2 (Two) Times a Day. 9/5/23   Bakari Swanson MD        Social History:   Social History     Tobacco Use    Smoking status: Never     Passive exposure: Never    Smokeless tobacco: Current     Types: Snuff   Vaping Use    Vaping Use: Never used   Substance Use Topics    Alcohol use: Not Currently     Comment: occ    Drug use: Never         Review of Systems:  Review of Systems   Constitutional:  Negative for chills, diaphoresis and fever.   HENT:  Negative for congestion, postnasal drip, rhinorrhea and sore throat.    Eyes:  Negative for photophobia.   Respiratory:  Negative for cough, chest tightness and shortness of breath.    Cardiovascular:  Negative for chest pain, palpitations and leg swelling.   Gastrointestinal:  Negative for abdominal pain, diarrhea, nausea and vomiting.   Genitourinary:  Negative for difficulty urinating, dysuria, flank pain, frequency, hematuria and urgency.   Musculoskeletal:  Negative for neck pain and neck stiffness.   Skin:  Negative for pallor and rash.   Neurological:  Positive for seizures. Negative for dizziness, syncope, weakness, numbness and headaches.   Hematological:  Negative for adenopathy. Does not bruise/bleed easily.   Psychiatric/Behavioral:  Positive for confusion.       Physical Exam:  /73   Pulse 95   Temp 98.3 °F (36.8 °C) (Oral)   Resp 19   Ht 172.7 cm (68\")   Wt 128 kg (282 lb 3 oz)   SpO2 93%   BMI 42.91 kg/m²         Physical Exam  Vitals and nursing note reviewed.   Constitutional:       General: He is not in acute distress.     Appearance: Normal appearance. He is not ill-appearing, toxic-appearing or diaphoretic.   HENT:      Head: Normocephalic and atraumatic.      Mouth/Throat:      Mouth: Mucous membranes are moist.   Eyes:      Pupils: Pupils are equal, " round, and reactive to light.   Cardiovascular:      Rate and Rhythm: Normal rate and regular rhythm.      Pulses: Normal pulses.           Carotid pulses are 2+ on the right side and 2+ on the left side.       Radial pulses are 2+ on the right side and 2+ on the left side.        Femoral pulses are 2+ on the right side and 2+ on the left side.       Popliteal pulses are 2+ on the right side and 2+ on the left side.        Dorsalis pedis pulses are 2+ on the right side and 2+ on the left side.        Posterior tibial pulses are 2+ on the right side and 2+ on the left side.      Heart sounds: Normal heart sounds. No murmur heard.  Pulmonary:      Effort: Pulmonary effort is normal. No accessory muscle usage, respiratory distress or retractions.      Breath sounds: Normal breath sounds. No wheezing, rhonchi or rales.   Abdominal:      General: Abdomen is flat. There is no distension.      Palpations: Abdomen is soft. There is no mass or pulsatile mass.      Tenderness: There is no abdominal tenderness. There is no right CVA tenderness, left CVA tenderness, guarding or rebound.      Comments: No rigidity   Musculoskeletal:         General: No swelling, tenderness or deformity.      Cervical back: Neck supple. No tenderness.      Right lower leg: No edema.      Left lower leg: No edema.   Skin:     General: Skin is warm and dry.      Capillary Refill: Capillary refill takes less than 2 seconds.      Coloration: Skin is not jaundiced or pale.      Findings: No erythema.   Neurological:      General: No focal deficit present.      Mental Status: He is alert and oriented to person, place, and time. Mental status is at baseline.      Cranial Nerves: Cranial nerves 2-12 are intact. No cranial nerve deficit.      Sensory: Sensation is intact. No sensory deficit.      Motor: Motor function is intact. No weakness or pronator drift.      Coordination: Coordination is intact. Coordination normal.   Psychiatric:         Mood and  Affect: Mood normal.         Behavior: Behavior normal.              Procedures:  Procedures      Medical Decision Making:      Comorbidities that affect care:    Alcohol abuse, hypertension, seizures    External Notes reviewed:    None      The following orders were placed and all results were independently analyzed by me:  Orders Placed This Encounter   Procedures    XR Chest 1 View    [place] Draw    Comprehensive Metabolic Panel    Single High Sensitivity Troponin T    Magnesium    Urinalysis With Microscopic If Indicated (No Culture) - Urine, Clean Catch    CBC Auto Differential    Phenytoin Level, Total    Ethanol    Urine Drug Screen - Urine, Clean Catch    Protime-INR    Ammonia    Continuous Pulse Oximetry    Vital Signs    ECG 12 Lead ED Triage Standing Order; Weak / Dizzy / AMS    CBC & Differential    Green Top (Gel)    Lavender Top    Gold Top - SST    Light Blue Top       Medications Given in the Emergency Department:  Medications   levETIRAcetam in NaCl 0.75% (KEPPRA) IVPB 1,000 mg (0 mg Intravenous Stopped 9/18/23 0318)        ED Course:    The patient was initially evaluated in the triage area where orders were placed. The patient was later dispositioned by Sushant Gusman DO.      The patient was advised to stay for completion of workup which includes but is not limited to communication of labs and radiological results, reassessment and plan. The patient was advised that leaving prior to disposition by a provider could result in critical findings that are not communicated to the patient.     ED Course as of 09/19/23 0256   Sun Sep 17, 2023   2121 EKG:    Rhythm: Sinus tachycardia  Rate: 100  Intervals: Normal MS and QT interval  T-wave: Nonspecific isolated T wave inversion III  ST Segment: No pathological ST elevation or reciprocal ST depression to suggest    EKG Comparison: No change from EKG performed September 14, 2023    Interpreted by me   [SD]      ED Course User Index  [SD] Sushant Gusman DO        Labs:    Lab Results (last 24 hours)       Procedure Component Value Units Date/Time    CBC & Differential [[id number]]  (Normal) Collected: 09/18/23 2143    Specimen: Blood Updated: 09/18/23 2150    Narrative:      The following orders were created for panel order CBC & Differential.  Procedure                               Abnormality         Status                     ---------                               -----------         ------                     CBC Auto Differential[[id number]]        Normal              Final result                 Please view results for these tests on the individual orders.    CBC Auto Differential [[id number]]  (Normal) Collected: 09/18/23 2143    Specimen: Blood Updated: 09/18/23 2150     WBC 6.84 10*3/mm3      RBC 5.47 10*6/mm3      Hemoglobin 17.5 g/dL      Hematocrit 49.5 %      MCV 90.5 fL      MCH 32.0 pg      MCHC 35.4 g/dL      RDW 14.6 %      RDW-SD 47.8 fl      MPV 8.8 fL      Platelets 142 10*3/mm3      Neutrophil % 53.2 %      Lymphocyte % 29.5 %      Monocyte % 10.1 %      Eosinophil % 5.4 %      Basophil % 1.5 %      Immature Grans % 0.3 %      Neutrophils, Absolute 3.64 10*3/mm3      Lymphocytes, Absolute 2.02 10*3/mm3      Monocytes, Absolute 0.69 10*3/mm3      Eosinophils, Absolute 0.37 10*3/mm3      Basophils, Absolute 0.10 10*3/mm3      Immature Grans, Absolute 0.02 10*3/mm3      nRBC 0.0 /100 WBC     POC Glucose Once [[id number]]  (Abnormal) Collected: 09/18/23 2146    Specimen: Blood Updated: 09/18/23 2148     Glucose 115 mg/dL      Comment: Serial Number: [id number]:  [id number]       Urinalysis With Microscopic If Indicated (No Culture) - Urine, Clean Catch [[id number]]  (Abnormal) Collected: 09/18/23 2157    Specimen: Urine, Clean Catch Updated: 09/18/23 2203     Color, UA Yellow     Appearance, UA Cloudy     pH, UA <=5.0     Specific Gravity, UA <=1.005     Glucose, UA Negative     Ketones, UA Negative     Bilirubin, UA Negative     Blood, UA Negative      Protein, UA Negative     Leuk Esterase, UA Negative     Nitrite, UA Negative     Urobilinogen, UA 1.0 E.U./dL    Narrative:      Urine microscopic not indicated.    Urine Drug Screen - Urine, Clean Catch [[id number]]  (Normal) Collected: 09/18/23 2157    Specimen: Urine, Clean Catch Updated: 09/19/23 0058     Amphet/Methamphet, Screen Negative     Barbiturates Screen, Urine Negative     Benzodiazepine Screen, Urine Negative     Cocaine Screen, Urine Negative     Opiate Screen Negative     THC, Screen, Urine Negative     Methadone Screen, Urine Negative     Oxycodone Screen, Urine Negative     Fentanyl, Urine Negative    Narrative:      Negative Thresholds Per Drugs Screened:    Amphetamines                 500 ng/ml  Barbiturates                 200 ng/ml  Benzodiazepines              100 ng/ml  Cocaine                      300 ng/ml  Methadone                    300 ng/ml  Opiates                      300 ng/ml  Oxycodone                    100 ng/ml  THC                           50 ng/ml  Fentanyl                       5 ng/ml      The Normal Value for all drugs tested is negative. This report includes final unconfirmed screening results to be used for medical treatment purposes only. Unconfirmed results must not be used for non-medical purposes such as employment or legal testing. Clinical consideration should be applied to any drug of abuse test, particularly when unconfirmed results are used.            Comprehensive Metabolic Panel [[id number]]  (Abnormal) Collected: 09/19/23 0034    Specimen: Blood Updated: 09/19/23 0105     Glucose 100 mg/dL      BUN 6 mg/dL      Creatinine 0.97 mg/dL      Sodium 142 mmol/L      Potassium 3.9 mmol/L      Comment: Slight hemolysis detected by analyzer. Results may be affected.        Chloride 105 mmol/L      CO2 22.8 mmol/L      Calcium 9.0 mg/dL      Total Protein 6.9 g/dL      Albumin 4.0 g/dL      ALT (SGPT) 72 U/L      AST (SGOT) 71 U/L      Alkaline Phosphatase 72 U/L       Total Bilirubin 0.6 mg/dL      Globulin 2.9 gm/dL      A/G Ratio 1.4 g/dL      BUN/Creatinine Ratio 6.2     Anion Gap 14.2 mmol/L      eGFR 101.2 mL/min/1.73     Narrative:      GFR Normal >60  Chronic Kidney Disease <60  Kidney Failure <15      Single High Sensitivity Troponin T [[id number]]  (Normal) Collected: 09/19/23 0034    Specimen: Blood Updated: 09/19/23 0105     HS Troponin T <6 ng/L     Narrative:      High Sensitive Troponin T Reference Range:  <10.0 ng/L- Negative Female for AMI  <15.0 ng/L- Negative Male for AMI  >=10 - Abnormal Female indicating possible myocardial injury.  >=15 - Abnormal Male indicating possible myocardial injury.   Clinicians would have to utilize clinical acumen, EKG, Troponin, and serial changes to determine if it is an Acute Myocardial Infarction or myocardial injury due to an underlying chronic condition.         Magnesium [[id number]]  (Normal) Collected: 09/19/23 0034    Specimen: Blood Updated: 09/19/23 0105     Magnesium 2.2 mg/dL     Ethanol [[id number]]  (Abnormal) Collected: 09/19/23 0034    Specimen: Blood Updated: 09/19/23 0105     Ethanol 121 mg/dL      Ethanol % 0.121 %     Narrative:      Ethanol (Plasma)  <10 Essentially Negative    Toxic Concentrations           mg/dL    Flushing, slowing of reflexes    Impaired visual activity         Depression of CNS              >100  Possible Coma                  >300       Levetiracetam Level (Keppra) [[id number]] Collected: 09/19/23 0214    Specimen: Blood Updated: 09/19/23 0223             Imaging:    XR Chest 1 View    Result Date: 9/18/2023  PROCEDURE: XR CHEST 1 VW  COMPARISON: 9/17/2023, 21:29.  INDICATIONS: 38-year-old male w/ unspecified weakness, dizziness, &/or altered mental status (AMS).  FINDINGS:  A single AP (or PA) upright portable chest radiograph was performed.  No cardiac enlargement is seen.  No acute infiltrate is appreciated.  No pleural effusion or pneumothorax is identified. External  artifacts obscure detail.  There is pulmonary hypoinflation.  There is probably artifactual blunting of the costophrenic sulci related to the low lung volumes.  Otherwise, no significant interval change is seen since the prior study (or studies).        No acute infiltrate is appreciated.     Please note that portions of this note were completed with a voice recognition program.  ASHLEY HONEYCUTT JR, MD       Electronically Signed and Approved By: ASHLEY HONEYCUTT JR, MD on 9/18/2023 at 22:41                 Differential Diagnosis and Discussion:      Seizure: Differential diagnosis includes but is not limited to meningitis, hypoglycemia, electrolyte abnormalities, intracranial hemorrhage, toxin induced, and pseudoseizure.    All labs were reviewed and interpreted by me.    MDM  Number of Diagnoses or Management Options  Alcoholic intoxication without complication  Recurrent seizures  Diagnosis management comments: The patient's vital signs were stable in the emergency room.  The patient had no further seizures while in the emergency room    Patient's ammonia was 33    Patient's urine toxicology was    The patient's urinalysis was negative for infection or blood    The patient's alcohol level was elevated 230    Patient's high-sensitivity troponin was normal    The patient's CBC was reviewed and shows no abnormalities of critical concern.  Of note, there is no anemia requiring a blood transfusion and the platelet count is acceptable    The patient's CMP was reviewed and shows no abnormalities of critical concern.  Of note, the patient's sodium and potassium are acceptable.  The patient's liver enzymes are unremarkable.  The patient's renal function including creatinine is preserved.  The patient has a normal anion gap.    The patient had a normal magnesium level.    The patient's EKG demonstrated no evidence of dysrhythmia or STEMI      02:52 EDT  The patient is leaving AGAINST MEDICAL ADVICE at this time.  The patient  is alert orient x3 cooperative and pleasant.  His speech is clear.  The patient vital signs are stable.  I have offered admission to the hospital due to the fact that this is the patient's seventh visit for recurrent seizure this month.  The patient states he is unwilling to stay in the hospital..  He would prefer to follow-up with Dr. Starks on an outpatient basis.  I have discussed the risks of seizure with him which include permanent brain injury, death and permanent neurological disability.  He voiced understanding.  I will increase his Keppra to 1000 mg twice a day.  I have prescribed that prescription.  I requested that he quit drinking..  The patient knows that he cannot drive and or perform dangerous activities released by the neurologist.  The patient is being discharged at his request.  Again he is leaving AGAINST MEDICAL ADVICE         Amount and/or Complexity of Data Reviewed  Clinical lab tests: reviewed  Tests in the radiology section of CPT®: reviewed  Tests in the medicine section of CPT®: reviewed  Review and summarize past medical records: yes (I reviewed the patient's head CT from September 13, 2023.  There is no evidence of acute intracranial abnormality.)         Social Determinants of Health:    Patient is independent, reliable, and has access to care.       Disposition and Care Coordination:    I advised the patient that in my opinion through evaluation of the history, physical, and objective data admission to the hospital is warranted. However, the patient/caretaker has declined admission understanding the risks of discharge.        Final diagnoses:   Recurrent seizures   Alcoholic intoxication without complication        ED Disposition       ED Disposition   AMA    Condition   --    Comment   --               This medical record created using voice recognition software.             Sushant Gusman DO  09/19/23 0256

## 2023-09-19 ENCOUNTER — APPOINTMENT (OUTPATIENT)
Dept: MRI IMAGING | Facility: HOSPITAL | Age: 38
DRG: 101 | End: 2023-09-19
Payer: COMMERCIAL

## 2023-09-19 ENCOUNTER — APPOINTMENT (OUTPATIENT)
Dept: NEUROLOGY | Facility: HOSPITAL | Age: 38
DRG: 101 | End: 2023-09-19
Payer: COMMERCIAL

## 2023-09-19 ENCOUNTER — READMISSION MANAGEMENT (OUTPATIENT)
Dept: CALL CENTER | Facility: HOSPITAL | Age: 38
End: 2023-09-19
Payer: COMMERCIAL

## 2023-09-19 VITALS
DIASTOLIC BLOOD PRESSURE: 105 MMHG | TEMPERATURE: 97.8 F | HEART RATE: 91 BPM | SYSTOLIC BLOOD PRESSURE: 141 MMHG | OXYGEN SATURATION: 100 % | RESPIRATION RATE: 16 BRPM | WEIGHT: 278.66 LBS | HEIGHT: 69 IN | BODY MASS INDEX: 41.27 KG/M2

## 2023-09-19 PROBLEM — R56.9 SEIZURE: Status: ACTIVE | Noted: 2023-09-19

## 2023-09-19 LAB
ALBUMIN SERPL-MCNC: 4 G/DL (ref 3.5–5.2)
ALBUMIN/GLOB SERPL: 1.4 G/DL
ALP SERPL-CCNC: 72 U/L (ref 39–117)
ALT SERPL W P-5'-P-CCNC: 72 U/L (ref 1–41)
AMPHET+METHAMPHET UR QL: NEGATIVE
ANION GAP SERPL CALCULATED.3IONS-SCNC: 13.1 MMOL/L (ref 5–15)
ANION GAP SERPL CALCULATED.3IONS-SCNC: 14.2 MMOL/L (ref 5–15)
AST SERPL-CCNC: 71 U/L (ref 1–40)
BARBITURATES UR QL SCN: NEGATIVE
BASOPHILS # BLD AUTO: 0.07 10*3/MM3 (ref 0–0.2)
BASOPHILS NFR BLD AUTO: 1.5 % (ref 0–1.5)
BENZODIAZ UR QL SCN: NEGATIVE
BILIRUB SERPL-MCNC: 0.6 MG/DL (ref 0–1.2)
BUN SERPL-MCNC: 6 MG/DL (ref 6–20)
BUN SERPL-MCNC: 7 MG/DL (ref 6–20)
BUN/CREAT SERPL: 6.2 (ref 7–25)
BUN/CREAT SERPL: 8 (ref 7–25)
CALCIUM SPEC-SCNC: 9 MG/DL (ref 8.6–10.5)
CALCIUM SPEC-SCNC: 9.5 MG/DL (ref 8.6–10.5)
CANNABINOIDS SERPL QL: NEGATIVE
CHLORIDE SERPL-SCNC: 103 MMOL/L (ref 98–107)
CHLORIDE SERPL-SCNC: 105 MMOL/L (ref 98–107)
CO2 SERPL-SCNC: 22.8 MMOL/L (ref 22–29)
CO2 SERPL-SCNC: 23.9 MMOL/L (ref 22–29)
COCAINE UR QL: NEGATIVE
CREAT SERPL-MCNC: 0.88 MG/DL (ref 0.76–1.27)
CREAT SERPL-MCNC: 0.97 MG/DL (ref 0.76–1.27)
DEPRECATED RDW RBC AUTO: 48.1 FL (ref 37–54)
EGFRCR SERPLBLD CKD-EPI 2021: 101.2 ML/MIN/1.73
EGFRCR SERPLBLD CKD-EPI 2021: 111.5 ML/MIN/1.73
EOSINOPHIL # BLD AUTO: 0.23 10*3/MM3 (ref 0–0.4)
EOSINOPHIL NFR BLD AUTO: 5.1 % (ref 0.3–6.2)
ERYTHROCYTE [DISTWIDTH] IN BLOOD BY AUTOMATED COUNT: 14.6 % (ref 12.3–15.4)
ETHANOL BLD-MCNC: 121 MG/DL (ref 0–10)
ETHANOL UR QL: 0.12 %
FENTANYL UR-MCNC: NEGATIVE NG/ML
GLOBULIN UR ELPH-MCNC: 2.9 GM/DL
GLUCOSE SERPL-MCNC: 100 MG/DL (ref 65–99)
GLUCOSE SERPL-MCNC: 126 MG/DL (ref 65–99)
HBA1C MFR BLD: 5 % (ref 4.8–5.6)
HCT VFR BLD AUTO: 48.7 % (ref 37.5–51)
HGB BLD-MCNC: 17.2 G/DL (ref 13–17.7)
IMM GRANULOCYTES # BLD AUTO: 0.02 10*3/MM3 (ref 0–0.05)
IMM GRANULOCYTES NFR BLD AUTO: 0.4 % (ref 0–0.5)
LYMPHOCYTES # BLD AUTO: 1.53 10*3/MM3 (ref 0.7–3.1)
LYMPHOCYTES NFR BLD AUTO: 33.6 % (ref 19.6–45.3)
MAGNESIUM SERPL-MCNC: 2 MG/DL (ref 1.6–2.6)
MAGNESIUM SERPL-MCNC: 2.2 MG/DL (ref 1.6–2.6)
MCH RBC QN AUTO: 32.2 PG (ref 26.6–33)
MCHC RBC AUTO-ENTMCNC: 35.3 G/DL (ref 31.5–35.7)
MCV RBC AUTO: 91.2 FL (ref 79–97)
METHADONE UR QL SCN: NEGATIVE
MONOCYTES # BLD AUTO: 0.47 10*3/MM3 (ref 0.1–0.9)
MONOCYTES NFR BLD AUTO: 10.3 % (ref 5–12)
NEUTROPHILS NFR BLD AUTO: 2.23 10*3/MM3 (ref 1.7–7)
NEUTROPHILS NFR BLD AUTO: 49.1 % (ref 42.7–76)
NRBC BLD AUTO-RTO: 0 /100 WBC (ref 0–0.2)
OPIATES UR QL: NEGATIVE
OXYCODONE UR QL SCN: NEGATIVE
PHOSPHATE SERPL-MCNC: 3.5 MG/DL (ref 2.5–4.5)
PLATELET # BLD AUTO: 142 10*3/MM3 (ref 140–450)
PMV BLD AUTO: 9.3 FL (ref 6–12)
POTASSIUM SERPL-SCNC: 3.7 MMOL/L (ref 3.5–5.2)
POTASSIUM SERPL-SCNC: 3.9 MMOL/L (ref 3.5–5.2)
PROT SERPL-MCNC: 6.9 G/DL (ref 6–8.5)
RBC # BLD AUTO: 5.34 10*6/MM3 (ref 4.14–5.8)
SODIUM SERPL-SCNC: 140 MMOL/L (ref 136–145)
SODIUM SERPL-SCNC: 142 MMOL/L (ref 136–145)
TROPONIN T SERPL HS-MCNC: <6 NG/L
TSH SERPL DL<=0.05 MIU/L-ACNC: 2.34 UIU/ML (ref 0.27–4.2)
WBC NRBC COR # BLD: 4.55 10*3/MM3 (ref 3.4–10.8)

## 2023-09-19 PROCEDURE — 82077 ASSAY SPEC XCP UR&BREATH IA: CPT | Performed by: EMERGENCY MEDICINE

## 2023-09-19 PROCEDURE — 80053 COMPREHEN METABOLIC PANEL: CPT

## 2023-09-19 PROCEDURE — 99236 HOSP IP/OBS SAME DATE HI 85: CPT | Performed by: INTERNAL MEDICINE

## 2023-09-19 PROCEDURE — 83735 ASSAY OF MAGNESIUM: CPT

## 2023-09-19 PROCEDURE — 84443 ASSAY THYROID STIM HORMONE: CPT | Performed by: FAMILY MEDICINE

## 2023-09-19 PROCEDURE — 84484 ASSAY OF TROPONIN QUANT: CPT

## 2023-09-19 PROCEDURE — 95816 EEG AWAKE AND DROWSY: CPT

## 2023-09-19 PROCEDURE — 84100 ASSAY OF PHOSPHORUS: CPT | Performed by: FAMILY MEDICINE

## 2023-09-19 PROCEDURE — 36415 COLL VENOUS BLD VENIPUNCTURE: CPT

## 2023-09-19 PROCEDURE — 85025 COMPLETE CBC W/AUTO DIFF WBC: CPT | Performed by: FAMILY MEDICINE

## 2023-09-19 PROCEDURE — 83735 ASSAY OF MAGNESIUM: CPT | Performed by: FAMILY MEDICINE

## 2023-09-19 PROCEDURE — 80177 DRUG SCRN QUAN LEVETIRACETAM: CPT | Performed by: EMERGENCY MEDICINE

## 2023-09-19 PROCEDURE — 83036 HEMOGLOBIN GLYCOSYLATED A1C: CPT | Performed by: FAMILY MEDICINE

## 2023-09-19 RX ORDER — BISACODYL 5 MG/1
5 TABLET, DELAYED RELEASE ORAL DAILY PRN
Status: DISCONTINUED | OUTPATIENT
Start: 2023-09-19 | End: 2023-09-19 | Stop reason: HOSPADM

## 2023-09-19 RX ORDER — AMOXICILLIN 250 MG
2 CAPSULE ORAL 2 TIMES DAILY
Status: DISCONTINUED | OUTPATIENT
Start: 2023-09-19 | End: 2023-09-19 | Stop reason: HOSPADM

## 2023-09-19 RX ORDER — LORAZEPAM 2 MG/ML
2 INJECTION INTRAMUSCULAR
Status: DISCONTINUED | OUTPATIENT
Start: 2023-09-19 | End: 2023-09-19 | Stop reason: HOSPADM

## 2023-09-19 RX ORDER — SODIUM CHLORIDE 0.9 % (FLUSH) 0.9 %
10 SYRINGE (ML) INJECTION AS NEEDED
Status: DISCONTINUED | OUTPATIENT
Start: 2023-09-19 | End: 2023-09-19 | Stop reason: HOSPADM

## 2023-09-19 RX ORDER — SODIUM CHLORIDE 0.9 % (FLUSH) 0.9 %
10 SYRINGE (ML) INJECTION EVERY 12 HOURS SCHEDULED
Status: DISCONTINUED | OUTPATIENT
Start: 2023-09-19 | End: 2023-09-19 | Stop reason: HOSPADM

## 2023-09-19 RX ORDER — LEVETIRACETAM 500 MG/1
1000 TABLET ORAL 2 TIMES DAILY
Status: DISCONTINUED | OUTPATIENT
Start: 2023-09-19 | End: 2023-09-19

## 2023-09-19 RX ORDER — SODIUM CHLORIDE 9 MG/ML
40 INJECTION, SOLUTION INTRAVENOUS AS NEEDED
Status: DISCONTINUED | OUTPATIENT
Start: 2023-09-19 | End: 2023-09-19 | Stop reason: HOSPADM

## 2023-09-19 RX ORDER — LORAZEPAM 0.5 MG/1
2 TABLET ORAL
Status: DISCONTINUED | OUTPATIENT
Start: 2023-09-19 | End: 2023-09-19 | Stop reason: HOSPADM

## 2023-09-19 RX ORDER — LORAZEPAM 0.5 MG/1
1 TABLET ORAL EVERY 6 HOURS
Status: DISCONTINUED | OUTPATIENT
Start: 2023-09-20 | End: 2023-09-19

## 2023-09-19 RX ORDER — LORAZEPAM 2 MG/ML
1 INJECTION INTRAMUSCULAR
Status: DISCONTINUED | OUTPATIENT
Start: 2023-09-19 | End: 2023-09-19 | Stop reason: HOSPADM

## 2023-09-19 RX ORDER — ENOXAPARIN SODIUM 100 MG/ML
40 INJECTION SUBCUTANEOUS DAILY
Status: DISCONTINUED | OUTPATIENT
Start: 2023-09-19 | End: 2023-09-19 | Stop reason: HOSPADM

## 2023-09-19 RX ORDER — LAMOTRIGINE 100 MG/1
100 TABLET ORAL EVERY 12 HOURS SCHEDULED
Qty: 60 TABLET | Refills: 0 | Status: SHIPPED | OUTPATIENT
Start: 2023-09-19 | End: 2023-10-19

## 2023-09-19 RX ORDER — SODIUM CHLORIDE, SODIUM LACTATE, POTASSIUM CHLORIDE, CALCIUM CHLORIDE 600; 310; 30; 20 MG/100ML; MG/100ML; MG/100ML; MG/100ML
75 INJECTION, SOLUTION INTRAVENOUS CONTINUOUS
Status: DISCONTINUED | OUTPATIENT
Start: 2023-09-19 | End: 2023-09-19

## 2023-09-19 RX ORDER — LORAZEPAM 0.5 MG/1
2 TABLET ORAL EVERY 6 HOURS
Status: DISCONTINUED | OUTPATIENT
Start: 2023-09-19 | End: 2023-09-19

## 2023-09-19 RX ORDER — LEVETIRACETAM 1000 MG/1
500 TABLET ORAL 2 TIMES DAILY
COMMUNITY
End: 2023-09-19 | Stop reason: HOSPADM

## 2023-09-19 RX ORDER — LORAZEPAM 0.5 MG/1
1 TABLET ORAL
Status: DISCONTINUED | OUTPATIENT
Start: 2023-09-19 | End: 2023-09-19 | Stop reason: HOSPADM

## 2023-09-19 RX ORDER — LANOLIN ALCOHOL/MO/W.PET/CERES
100 CREAM (GRAM) TOPICAL DAILY
Qty: 30 TABLET | Refills: 0 | Status: SHIPPED | OUTPATIENT
Start: 2023-09-24 | End: 2023-10-24

## 2023-09-19 RX ORDER — LAMOTRIGINE 100 MG/1
100 TABLET ORAL EVERY 12 HOURS SCHEDULED
Status: DISCONTINUED | OUTPATIENT
Start: 2023-09-19 | End: 2023-09-19 | Stop reason: HOSPADM

## 2023-09-19 RX ORDER — FOLIC ACID 1 MG/1
1 TABLET ORAL DAILY
Status: DISCONTINUED | OUTPATIENT
Start: 2023-09-19 | End: 2023-09-19 | Stop reason: HOSPADM

## 2023-09-19 RX ORDER — THIAMINE HYDROCHLORIDE 100 MG/ML
200 INJECTION, SOLUTION INTRAMUSCULAR; INTRAVENOUS EVERY 8 HOURS SCHEDULED
Status: DISCONTINUED | OUTPATIENT
Start: 2023-09-19 | End: 2023-09-19 | Stop reason: HOSPADM

## 2023-09-19 RX ORDER — FOLIC ACID 1 MG/1
1 TABLET ORAL DAILY
Qty: 30 TABLET | Refills: 0 | Status: SHIPPED | OUTPATIENT
Start: 2023-09-20 | End: 2023-10-20

## 2023-09-19 RX ORDER — BISACODYL 10 MG
10 SUPPOSITORY, RECTAL RECTAL DAILY PRN
Status: DISCONTINUED | OUTPATIENT
Start: 2023-09-19 | End: 2023-09-19 | Stop reason: HOSPADM

## 2023-09-19 RX ORDER — LEVETIRACETAM 500 MG/1
500 TABLET ORAL 2 TIMES DAILY
Status: DISCONTINUED | OUTPATIENT
Start: 2023-09-19 | End: 2023-09-19

## 2023-09-19 RX ORDER — POLYETHYLENE GLYCOL 3350 17 G/17G
17 POWDER, FOR SOLUTION ORAL DAILY PRN
Status: DISCONTINUED | OUTPATIENT
Start: 2023-09-19 | End: 2023-09-19 | Stop reason: HOSPADM

## 2023-09-19 RX ADMIN — Medication 10 ML: at 10:14

## 2023-09-19 RX ADMIN — FOLIC ACID 1 MG: 1 TABLET ORAL at 10:13

## 2023-09-19 RX ADMIN — LEVETIRACETAM 500 MG: 500 TABLET, FILM COATED ORAL at 10:13

## 2023-09-19 NOTE — PLAN OF CARE
Goal Outcome Evaluation:  Plan of Care Reviewed With: patient, spouse        Progress: no change  Outcome Evaluation: Patient rested anxiously in his room all day. Up ad-romelia. Seen by physician at bedside. Down for MRI scan but unable to complete due to claustrophobia. Neurology consulted due to history of seizure activity. No seizure activity noted today; patient adamant that he is going home today and states if he does not get discharged he will be leaving AMA by 1800.

## 2023-09-19 NOTE — PAYOR COMM NOTE
"Pipe Bernardo (38 y.o. Male)       Date of Birth   1985    Social Security Number       Address   [address] [address]    Home Phone   [phone]    MRN   [id number]       Jew   None    Marital Status                               Admission Date   9/18/23    Admission Type   Emergency    Admitting Provider   Leonardo Yepez DO    Attending Provider   Norberto Xiong MD    Department, Room/Bed   [address], 4016/1       Discharge Date       Discharge Disposition       Discharge Destination                                 Attending Provider: Norberto Xinog MD    Allergies: Benadryl [Diphenhydramine]    Isolation: None   Infection: None   Code Status: CPR    Ht: 175.3 cm (69\")   Wt: 126 kg (278 lb 10.6 oz)    Admission Cmt: None   Principal Problem: Seizure [R56.9]                   Active Insurance as of 9/18/2023       Primary Coverage       Payor Plan Insurance Group Employer/Plan Group    [facility] [employer] [facility] BY [employer]YN [id number]       Payor Plan Address Payor Plan Phone Number Payor Plan Fax Number Effective Dates    [address] [address]   1/1/2021 - None Entered    [facility] [address]         Subscriber Name Subscriber Birth Date Member ID       PIPE BERNARDO 1985 [id number]                     Emergency Contacts        (Rel.) Home Phone Work Phone Mobile Phone    Traci Bernardo (Spouse) [phone] -- [phone]    Jazmin Bernardo (Mother) [phone] -- [phone]    Tino Bernardo (Father) [phone] -- [phone]           Seizure [facility] Inpatient Care by Maria Elena Colon, RN       Indications Met: Reviewed on 9/19/2023 by Maria Elena Colon RN       Created Using Review Status Review Entered   [facility] Case Management Primary Completed 9/19/2023 1245       Created By   Maria Elena Colon, RN       Criteria Set Name - Subset   Seizure [facility]G Inpatient Care      Criteria Review   Seizure [facility]G Inpatient Care   "   Overall Determination: Indications Met     Criteria:  [×] Admission is indicated for  1 or more  of the following :      [×] New focal neurologic deficit (eg, postseizure)          9/19/2023 12:42 PM              -- 9/19/2023 12:42 PM by Maria Elena Colon RN --                  WEAKNESS      [×] Recurrent seizure (ie, during emergency department or observation care) and  1 or more  of the following :          [×] Recurrent seizure is of a different seizure type than previously known seizure disorder (ie, not their usual seizure type).              9/19/2023 12:45 PM                  -- 9/19/2023 12:45 PM by Maria Elena Colon RN --                      FREQUENT -LAST ONE LASTED 10 MIN-7TH VISIT TO ED FOR SZ-NO PRIOR HX PRIOR TO AUGUST PER WIFE USU DRINKS 2 TALL BEERS A DAY,DID DRINK ALITTLE MORE TODAY-DOES NOT CONSIDER HIMSELF AN ALCOHOLIC AND DOES NOT WANT ANY REHAB RELATED TO THIS     Notes:  -- 9/19/2023 12:42 PM by Maria Elena Colon RN --      REPORTS OF SEIZURES-SEEN 24 HRS PRIOR AND LEFT AMA-SZ PREVIOUSLY AMD UNABLE TO SEE NEURO UNTIL 9/29-SZ TODAY LASTED 10 MIN-LASTING 3-5 MIN [id number] BEEN COMNTROLLED UP UNTIL NOW-HAS BEEN DRINKING ALCOHOL-POOR SLEEP,SOME NIGHTS UP ALL NIGHT-WAS SCHED FOR SLEEP STUDY BUT DID NOT GO· Seizure disorder-MRI with and without contrast. Neurology consult in a.m.      · Alcohol use disorder-CIWA. Patient is not acutely withdrawing at this time, will continue to monitor.      · Hypertension-controlled, continue home meds.      · Morbid obesity-check A1c.       · Medical noncompliance-demonstrated by multiple AMA visits for uncontrolled, potentially life-threatening disorder. Patient encouraged to follow recommendations and complete testing and work-ups as necessary. Patient verbalized understanding. 983. 124 19 125/92            SATS 92% REFUSING CEREBELL [HURTS HEAD]                   History & Physical        Leonardo Yepez DO at 09/19/23 0235           [facility]  "  HOSPITALIST HISTORY AND PHYSICAL  Date: 2023   Patient Name: Kurt Balbuena  : 1985  MRN: [id number]  Primary Care Physician:  Dalila Moon APRN  Date of admission: 2023    Subjective  Subjective     Chief Complaint: Seizures    HPI:    Kurt Balbuena is a 38 y.o. male brought to the emergency department for evaluation of recurrent seizure disorders.  Patient was seen in our emergency department 24 hours prior, similar issues.  Patient decided to leave AMA at that time.  Patient has had approximately 15 ER visits this year for similar issues.  Patient states he has taken his Keppra as prescribed daily.  Seizure today was witnessed by his wife.  Reportedly last 3 to 5 minutes.  Described as tonic-clonic.  No loss of control of bowel or bladder function.  Patient reports that his seizure disorder has been controlled up till this month, has been able to see neurology until .  Patient states that he has been drinking alcohol.  Patient is unable to quantify how much.  Patient reports poor quality sleep, some nights, patient states \"I will stay up all night\".  Patient was scheduled for sleep study, but did not go to the appointment.      Personal History     Past Medical History:  Past Medical History:   Diagnosis Date    Hernia of abdominal wall     Hypertension     MI (myocardial infarction)     Seizures          Past Surgical History:  Past Surgical History:   Procedure Laterality Date    APPENDECTOMY           Family History:   History reviewed. No pertinent family history.      Social History:   Social History     Socioeconomic History    Marital status:    Tobacco Use    Smoking status: Never     Passive exposure: Never    Smokeless tobacco: Current     Types: Snuff   Vaping Use    Vaping Use: Never used   Substance and Sexual Activity    Alcohol use: Not Currently     Comment: occ    Drug use: Never    Sexual activity: Defer         Home " Medications:  levETIRAcetam    Allergies:  Allergies   Allergen Reactions    Benadryl [Diphenhydramine] Hallucinations       Review of Systems   All systems were reviewed and negative except for: Seizure disorder, disorientation    Objective  Objective     Vitals:   Temp:  [98.6 °F (37 °C)] 98.6 °F (37 °C)  Heart Rate:  [] 103  Resp:  [16] 16  BP: (105-124)/(63-86) 113/78    Physical Exam    Constitutional: Awake, alert, no acute distress, morbidly obese   Eyes: Pupils equal, sclerae anicteric, no conjunctival injection   HENT: NCAT, mucous membranes moist   Neck: Supple, no thyromegaly, no lymphadenopathy, trachea midline   Respiratory: Clear to auscultation bilaterally, nonlabored respirations    Cardiovascular: RRR, no murmurs, rubs, or gallops, palpable pedal pulses bilaterally   Gastrointestinal: Positive bowel sounds, soft, nontender, nondistended   Musculoskeletal: No bilateral ankle edema, no clubbing or cyanosis to extremities   Psychiatric: flat affect, limitedly cooperative, argumentative   Neurologic: Oriented x 3, strength symmetric in all extremities, Cranial Nerves grossly intact to confrontation, speech clear   Skin: No rashes     Result Review   Result Review:  I have personally reviewed the results from the time of this admission to 9/19/2023 02:35 EDT and agree with these findings:  [x]  Laboratory  []  Microbiology  [x]  Radiology  []  EKG/Telemetry   [x]  Cardiology/Vascular   []  Pathology  []  Old records  []  Other:      Assessment & Plan  Assessment / Plan     Assessment/Plan:   Seizure disorder-MRI with and without contrast.  Neurology consult in a.m.  Alcohol use disorder-CIWA.  Patient is not acutely withdrawing at this time, will continue to monitor.  Hypertension-controlled, continue home meds.  Morbid obesity-check A1c.    Medical noncompliance-demonstrated by multiple AMA visits for uncontrolled, potentially life-threatening disorder.  Patient encouraged to follow  recommendations and complete testing and work-ups as necessary.  Patient verbalized understanding.      DVT prophylaxis: Lovenox    CODE STATUS: Full code    Admission Status:  I believe this patient meets inpatient status.    Electronically signed by Leonardo Yepez DO, 09/19/23, 2:35 AM EDT.               Electronically signed by Leonardo Yepez DO at 09/19/23 0244          Emergency Department Notes        Kristi Arriaga, RN at 09/18/23 5119          Seizures dx x 3 weeks, started Keppra, neuro appt on 9/29.  Consistently having seizures every day.  Wife picked up from tae's house-counted 12 with short postictal period.  Collapsed when getting out of the vehicle-EMS states patient did help get himself to the stretcher    BG 71, 1/2 amp d50.  BG 99 when rechecked by EMS    Feels weak and shaky.    Was here last night but left AMA    Electronically signed by Kristi Arriaga, RN at 09/18/23 2137       Physician Progress Notes (last 24 hours)  Notes from 09/18/23 1246 through 09/19/23 1246   No notes of this type exist for this encounter.       Consult Notes (last 24 hours)  Notes from 09/18/23 1246 through 09/19/23 1246   No notes of this type exist for this encounter.        .

## 2023-09-19 NOTE — PLAN OF CARE
Goal Outcome Evaluation:  PT admitted from ED with seizures, no seizures while in ED. PT A&O times 4 able to voice needs and wants denies pain no s/s of distress, PT ambulates self with steady gait. PT cont NPO and very upset stating if that is not changed soon he will leave, message sent to on call awaiting response, PT also refused new orders for ATIVAN and Lovenox Call bell in reach

## 2023-09-19 NOTE — ED PROVIDER NOTES
Time: 1:51 AM EDT  Date of encounter:  9/18/2023  Independent Historian/Clinical History and Information was obtained by:   Patient and Family  Chief Complaint: Recurrent seizure    History is limited by: N/A    History of Present Illness:  Patient is a 38 y.o. year old male who presents to the emergency department for evaluation of recurrent seizure.  The patient again presents tonight with recurrent seizure.  The patient has had 8 emergency room visits for the same thing.  The patient has been encouraged to stay in the hospital on least 2 or 3 visits and has refused.  Patient states he took his Keppra to daily as prescribed.  However, the patient had another seizure today.  Witnessed by his wife.  She states it lasted for 3 to 5 minutes.  The patient had a postictal state.  She describes the event as tonic-clonic.  The patient had no loss of bladder or bowel function.  Patient had no chest pain chest pressure or shortness of breath.  The patient's had no abdominal pain.  The patient had no vomiting.  According to the patient he has not had seizures up until this month.  The patient has been referred to neurology but has not been able to get in.  The patient continues to abuse alcohol.  The patient states that he has drank today.  The patient notes that he left AGAINST MEDICAL ADVICE last night    HPI    Patient Care Team  Primary Care Provider: Dalila Moon APRN    Past Medical History:     Allergies   Allergen Reactions    Benadryl [Diphenhydramine] Hallucinations     Past Medical History:   Diagnosis Date    Hernia of abdominal wall     Hypertension     MI (myocardial infarction)     Seizures      Past Surgical History:   Procedure Laterality Date    APPENDECTOMY       History reviewed. No pertinent family history.    Home Medications:  Prior to Admission medications    Medication Sig Start Date End Date Taking? Authorizing Provider   levETIRAcetam (KEPPRA) 1000 MG tablet Take 1 tablet by mouth 2 (Two)  "Times a Day for 30 days. 9/18/23 10/18/23  EliceoSushant lezama         Social History:   Social History     Tobacco Use    Smoking status: Never     Passive exposure: Never    Smokeless tobacco: Current     Types: Snuff   Vaping Use    Vaping Use: Never used   Substance Use Topics    Alcohol use: Yes     Alcohol/week: 8.0 standard drinks     Types: 8 Cans of beer per week     Comment: occ    Drug use: Never         Review of Systems:  Review of Systems   Constitutional:  Negative for chills, diaphoresis and fever.   HENT:  Negative for congestion, postnasal drip, rhinorrhea and sore throat.    Eyes:  Negative for photophobia.   Respiratory:  Negative for cough, chest tightness and shortness of breath.    Cardiovascular:  Negative for chest pain, palpitations and leg swelling.   Gastrointestinal:  Negative for abdominal pain, diarrhea, nausea and vomiting.   Genitourinary:  Negative for difficulty urinating, dysuria, flank pain, frequency, hematuria and urgency.   Musculoskeletal:  Negative for neck pain and neck stiffness.   Skin:  Negative for pallor and rash.   Neurological:  Positive for seizures. Negative for dizziness, syncope, weakness, numbness and headaches.   Hematological:  Negative for adenopathy. Does not bruise/bleed easily.   Psychiatric/Behavioral: Negative.        Physical Exam:  BP (!) 141/105 (BP Location: Left arm, Patient Position: Sitting)   Pulse 91   Temp 97.8 °F (36.6 °C) (Oral)   Resp 16   Ht 175.3 cm (69\")   Wt 126 kg (278 lb 10.6 oz)   SpO2 100%   BMI 41.15 kg/m²     Physical Exam  Vitals and nursing note reviewed.   Constitutional:       General: He is not in acute distress.     Appearance: Normal appearance. He is not ill-appearing, toxic-appearing or diaphoretic.   HENT:      Head: Normocephalic and atraumatic.      Mouth/Throat:      Mouth: Mucous membranes are moist.   Eyes:      Pupils: Pupils are equal, round, and reactive to light.   Cardiovascular:      Rate and Rhythm: Normal " rate and regular rhythm.      Pulses: Normal pulses.           Carotid pulses are 2+ on the right side and 2+ on the left side.       Radial pulses are 2+ on the right side and 2+ on the left side.        Femoral pulses are 2+ on the right side and 2+ on the left side.       Popliteal pulses are 2+ on the right side and 2+ on the left side.        Dorsalis pedis pulses are 2+ on the right side and 2+ on the left side.        Posterior tibial pulses are 2+ on the right side and 2+ on the left side.      Heart sounds: Normal heart sounds. No murmur heard.  Pulmonary:      Effort: Pulmonary effort is normal. No accessory muscle usage, respiratory distress or retractions.      Breath sounds: Normal breath sounds. No wheezing, rhonchi or rales.   Abdominal:      General: Abdomen is flat. A surgical scar is present. There is no distension.      Palpations: Abdomen is soft. There is no mass or pulsatile mass.      Tenderness: There is no abdominal tenderness. There is no right CVA tenderness, left CVA tenderness, guarding or rebound.      Comments: No rigidity   Musculoskeletal:         General: No swelling, tenderness or deformity.      Cervical back: Neck supple. No tenderness.      Right lower leg: No edema.      Left lower leg: No edema.   Skin:     General: Skin is warm and dry.      Capillary Refill: Capillary refill takes less than 2 seconds.      Coloration: Skin is not jaundiced or pale.      Findings: No erythema.   Neurological:      General: No focal deficit present.      Mental Status: He is alert and oriented to person, place, and time. Mental status is at baseline.      Cranial Nerves: Cranial nerves 2-12 are intact. No cranial nerve deficit.      Sensory: Sensation is intact. No sensory deficit.      Motor: Motor function is intact. No weakness or pronator drift.      Coordination: Coordination is intact. Coordination normal.   Psychiatric:         Mood and Affect: Mood normal.         Behavior: Behavior  normal.                Procedures:  Procedures      Medical Decision Making:      Comorbidities that affect care:    Hypertension, coronary disease, alcohol abuse, seizure    External Notes reviewed:    None      The following orders were placed and all results were independently analyzed by me:  Orders Placed This Encounter   Procedures    XR Chest 1 View    [place] Draw    Comprehensive Metabolic Panel    Single High Sensitivity Troponin T    Magnesium    Urinalysis With Microscopic If Indicated (No Culture) - Urine, Clean Catch    CBC Auto Differential    Urine Drug Screen - Urine, Clean Catch    Ethanol    Hemoglobin A1c    TSH    CBC Auto Differential    Undress & Gown    Vital Signs    Obtain Baseline Clinical [place] Withdrawal Assessment - Ar (CIWA-Ar), Sedation Scale & Vital Signs    Discharge Follow-up with PCP    Discharge Follow-up with Specified Provider: Dr Starks    Activity as Tolerated    Diet: Regular/House Diet; Regular Texture (IDDSI 7); Thin (IDDSI 0)    Inpatient Case Management  Consult    POC Glucose Once    POC Glucose Once    ECG 12 Lead ED Triage Standing Order; Weak / Dizzy / AMS    EEG    Inpatient Admission    Discharge patient    CBC & Differential    Green Top (Gel)    Lavender Top    Gold Top - SST    Light Blue Top       Medications Given in the Emergency Department:  Medications - No data to display       ED Course:         Labs:    Lab Results (last 24 hours)       Procedure Component Value Units Date/Time    Basic Metabolic Panel [[id number]]  (Abnormal) Collected: 09/19/23 0542    Specimen: Blood Updated: 09/19/23 0638     Glucose 126 mg/dL      BUN 7 mg/dL      Creatinine 0.88 mg/dL      Sodium 140 mmol/L      Potassium 3.7 mmol/L      Chloride 103 mmol/L      CO2 23.9 mmol/L      Calcium 9.5 mg/dL      BUN/Creatinine Ratio 8.0     Anion Gap 13.1 mmol/L      eGFR 111.5 mL/min/1.73     Narrative:      GFR Normal >60  Chronic Kidney Disease <60  Kidney Failure  <15      Magnesium [[id number]]  (Normal) Collected: 09/19/23 0542    Specimen: Blood Updated: 09/19/23 0638     Magnesium 2.0 mg/dL     Phosphorus [[id number]]  (Normal) Collected: 09/19/23 0542    Specimen: Blood Updated: 09/19/23 0638     Phosphorus 3.5 mg/dL     Hemoglobin A1c [[id number]]  (Normal) Collected: 09/19/23 0542    Specimen: Blood Updated: 09/19/23 1028     Hemoglobin A1C 5.00 %     Narrative:      Hemoglobin A1C Ranges:    Increased Risk for Diabetes  5.7% to 6.4%  Diabetes                     >= 6.5%  Diabetic Goal                < 7.0%    CBC & Differential [[id number]] Collected: 09/19/23 0543    Specimen: Blood Updated: 09/19/23 0623    Narrative:      The following orders were created for panel order CBC & Differential.  Procedure                               Abnormality         Status                     ---------                               -----------         ------                     CBC Auto Differential[[id number]]        Normal              Final result               Scan Slide[[id number]]                                                                    Please view results for these tests on the individual orders.    CBC Auto Differential [[id number]]  (Normal) Collected: 09/19/23 0543    Specimen: Blood Updated: 09/19/23 0623     WBC 4.55 10*3/mm3      RBC 5.34 10*6/mm3      Hemoglobin 17.2 g/dL      Hematocrit 48.7 %      MCV 91.2 fL      MCH 32.2 pg      MCHC 35.3 g/dL      RDW 14.6 %      RDW-SD 48.1 fl      MPV 9.3 fL      Platelets 142 10*3/mm3      Neutrophil % 49.1 %      Lymphocyte % 33.6 %      Monocyte % 10.3 %      Eosinophil % 5.1 %      Basophil % 1.5 %      Immature Grans % 0.4 %      Neutrophils, Absolute 2.23 10*3/mm3      Lymphocytes, Absolute 1.53 10*3/mm3      Monocytes, Absolute 0.47 10*3/mm3      Eosinophils, Absolute 0.23 10*3/mm3      Basophils, Absolute 0.07 10*3/mm3      Immature Grans, Absolute 0.02 10*3/mm3      nRBC 0.0 /100 WBC               Imaging:    EEG    Result Date: 2023  Table formatting from the original result was not included. Images from the original result were not included. [address] [place] [address] [phone] ELECTROENCEPHALOGRAPHIC REPORT Patient: Kurt Gage Day EEG # :    RXP-B- : 1985  ID:      [id number]    Age: 38 y.o. male    Primary  Physician: Kian Howell MD Technician: Keri Quinones Ordering  Physician: Leonardo Yepez DO    Recording Date: 2023 Report  Date: 2023     History:  Alcohol withdrawal syndrome Medications: Keppra Reading: The EEG was obtained portable in his room during the waking and light stages of sleep as well as on photic stimulation with video monitoring.  We do not know how much sleep he has had the night before the testing. The dominant waking background activity consist of low voltage fast activities which were mixed with intermittent 20 to 60 µV 10 cps which were prominent on both parieto-occipital regions and were reactive to changes in states.  There were symmetric vertex activities during the light stages of sleep.  There were symmetric responses on photic stimulation at various frequencies ranging from 15 to 21 Hz.  There was no amplitude asymmetry.  No paroxysmal discharges. Impression: Normal EEG during the waking and light stages of sleep. Photic stimulation did not activate any abnormalities. There were no epileptiform discharges noted today. Electronically signed by Sasha Starks Jr., MD, 23, 10:15 PM EDT. Please note that portions of this note were completed with a voice recognition program.  Part of this note is an electric or electronic transcription/translation of spoken language to printed text using the dragon dictating system.        Differential Diagnosis and Discussion:    Seizure: Differential diagnosis includes but is not limited to meningitis, hypoglycemia, electrolyte abnormalities, intracranial hemorrhage, toxin  induced, and pseudoseizure.    All labs were reviewed and interpreted by me.    MDM  Number of Diagnoses or Management Options  Alcoholic intoxication with complication  Recurrent seizures  Diagnosis management comments: The patient's vital signs were stable while in the emergency room    Had no further seizures while in the emergency room    The patient's alcohol was 121 gaiting alcohol intoxication    The patient's CMP was reviewed and shows no abnormalities of critical concern.  Of note, the patient's sodium and potassium are acceptable.  The patient's liver enzymes are unremarkable.  The patient's renal function including creatinine is preserved.  The patient has a normal anion gap.    Patient's urine toxicology was negative for any obvious drugs    The patient's urinalysis was negative for obvious infection or blood    The patient's CBC was reviewed and shows no abnormalities of critical concern.  Of note, there is no anemia requiring a blood transfusion and the platelet count is acceptable    Keppra level was pending at the time of admission    The patient was subsequently admitted to the hospital due to 8 visits for recurrent seizure in the emergency department over the last several weeks without neurology evaluation       Amount and/or Complexity of Data Reviewed  Clinical lab tests: reviewed  Tests in the radiology section of CPT®: reviewed  Tests in the medicine section of CPT®: reviewed  Discuss the patient with other providers: yes (01:55 EDT  I discussed the case with the hospitalist.  We have discussed the patient's presenting symptoms, laboratory values, imaging and condition at the time of admission.  They will evaluate the patient in the emergency room and admit the patient to the hospital)             Social Determinants of Health:    Patient is independent, reliable, and has access to care.       Disposition and Care Coordination:    Admit:   Through independent evaluation of the patient's  history, physical, and imperical data, the patient meets criteria for observation/admission to the hospital.        Final diagnoses:   Recurrent seizures   Alcoholic intoxication with complication        ED Disposition       ED Disposition   Decision to Admit    Condition   --    Comment   Level of Care: Remote Telemetry [26]   Diagnosis: Seizure [030663]   Admitting Physician: QUETA CAT [[id number]]   Attending Physician: QUETA CAT [[id number]]   Certification: I Certify That Inpatient Hospital Services Are Medically Necessary For Greater Than 2 Midnights                 This medical record created using voice recognition software.             Sushant Gusman DO  09/20/23 0342

## 2023-09-19 NOTE — H&P
" [facility]  Date: 2023   Patient Name: Kurt Balbuena  : 1985  MRN: [id number]  Primary Care Physician:  Dalila Moon APRN  Date of admission: 2023    Subjective   Subjective     Chief Complaint: Seizures    HPI:    Kurt Balbuena is a 38 y.o. male brought to the emergency department for evaluation of recurrent seizure disorders.  Patient was seen in our emergency department 24 hours prior, similar issues.  Patient decided to leave AMA at that time.  Patient has had approximately 15 ER visits this year for similar issues.  Patient states he has taken his Keppra as prescribed daily.  Seizure today was witnessed by his wife.  Reportedly last 3 to 5 minutes.  Described as tonic-clonic.  No loss of control of bowel or bladder function.  Patient reports that his seizure disorder has been controlled up till this month, has been able to see neurology until .  Patient states that he has been drinking alcohol.  Patient is unable to quantify how much.  Patient reports poor quality sleep, some nights, patient states \"I will stay up all night\".  Patient was scheduled for sleep study, but did not go to the appointment.      Personal History     Past Medical History:  Past Medical History:   Diagnosis Date    Hernia of abdominal wall     Hypertension     MI (myocardial infarction)     Seizures          Past Surgical History:  Past Surgical History:   Procedure Laterality Date    APPENDECTOMY           Family History:   History reviewed. No pertinent family history.      Social History:   Social History     Socioeconomic History    Marital status:    Tobacco Use    Smoking status: Never     Passive exposure: Never    Smokeless tobacco: Current     Types: Snuff   Vaping Use    Vaping Use: Never used   Substance and Sexual Activity    Alcohol use: Not Currently     Comment: occ    Drug use: Never    Sexual activity: Defer         Home " Medications:  levETIRAcetam    Allergies:  Allergies   Allergen Reactions    Benadryl [Diphenhydramine] Hallucinations       Review of Systems   All systems were reviewed and negative except for: Seizure disorder, disorientation    Objective   Objective     Vitals:   Temp:  [98.6 °F (37 °C)] 98.6 °F (37 °C)  Heart Rate:  [] 103  Resp:  [16] 16  BP: (105-124)/(63-86) 113/78    Physical Exam    Constitutional: Awake, alert, no acute distress, morbidly obese   Eyes: Pupils equal, sclerae anicteric, no conjunctival injection   HENT: NCAT, mucous membranes moist   Neck: Supple, no thyromegaly, no lymphadenopathy, trachea midline   Respiratory: Clear to auscultation bilaterally, nonlabored respirations    Cardiovascular: RRR, no murmurs, rubs, or gallops, palpable pedal pulses bilaterally   Gastrointestinal: Positive bowel sounds, soft, nontender, nondistended   Musculoskeletal: No bilateral ankle edema, no clubbing or cyanosis to extremities   Psychiatric: flat affect, limitedly cooperative, argumentative   Neurologic: Oriented x 3, strength symmetric in all extremities, Cranial Nerves grossly intact to confrontation, speech clear   Skin: No rashes     Result Review    Result Review:  I have personally reviewed the results from the time of this admission to 9/19/2023 02:35 EDT and agree with these findings:  [x]  Laboratory  []  Microbiology  [x]  Radiology  []  EKG/Telemetry   [x]  Cardiology/Vascular   []  Pathology  []  Old records  []  Other:      Assessment & Plan   Assessment / Plan     Assessment/Plan:   Seizure disorder-MRI with and without contrast.  Neurology consult in a.m.  Alcohol use disorder-CIWA.  Patient is not acutely withdrawing at this time, will continue to monitor.  Hypertension-controlled, continue home meds.  Morbid obesity-check A1c.    Medical noncompliance-demonstrated by multiple AMA visits for uncontrolled, potentially life-threatening disorder.  Patient encouraged to follow  recommendations and complete testing and work-ups as necessary.  Patient verbalized understanding.      DVT prophylaxis: Lovenox    CODE STATUS: Full code    Admission Status:  I believe this patient meets inpatient status.    Electronically signed by Leonardo Yepez DO, 09/19/23, 2:35 AM EDT.

## 2023-09-19 NOTE — CONSULTS
Discharge Planning Assessment  [facility]     Patient Name: Kurt Balbuena  MRN: [id number]  Today's Date: 9/19/2023    Admit Date: 9/18/2023  Plan: Pt admitted due to frequent seizures. ANETTE spoke with pt's wife, Traci, who states that these seizures have been occuring multiple times a day and are new to pt. Traci unsure of cause at this time. Pt does drink alcohol daily but is not interested in treatment at this time. Pt is independent in ADLs. No DME needs noted. Traci denies any discharge needs at this time and plans for pt to return home once stable. Pharmacy/PCP confirmed. Will follow.     Discharge Needs Assessment       Row Name 09/19/23 0345       Living Environment    People in Home spouse    Name(s) of People in Home Pt lives in [place] with his wife, Traci HALL.    Current Living Arrangements home    Primary Care Provided by self    Provides Primary Care For no one    Family Caregiver if Needed spouse    Family Caregiver Names Traci Balbuena- Spouse    Quality of Family Relationships supportive;involved;helpful    Able to Return to Prior Arrangements yes       Resource/Environmental Concerns    Resource/Environmental Concerns none       Transition Planning    Patient/Family Anticipates Transition to home    Patient/Family Anticipated Services at Transition none    Transportation Anticipated family or friend will provide;car, drives self       Discharge Needs Assessment    Readmission Within the Last 30 Days no previous admission in last 30 days    Equipment Currently Used at Home none    Concerns to be Addressed denies needs/concerns at this time    Anticipated Changes Related to Illness none    Equipment Needed After Discharge none              Discharge Plan       Row Name 09/19/23 1109       Plan    Plan Pt admitted due to frequent seizures. ANETTE spoke with pt's wife, Traci, who states that these seizures have been occuring multiple times a day and are new to pt. Traci unsure of cause at  this time. Pt does drink alcohol daily but is not interested in treatment at this time. Pt is independent in ADLs. No DME needs noted. Traci denies any discharge needs at this time and plans for pt to return home once stable. Pharmacy/PCP confirmed. Will follow.    Patient/Family in Agreement with Plan yes              Demographic Summary       Row Name 09/19/23 1106       General Information    Admission Type inpatient    Arrived From emergency department    Referral Source admission list    Reason for Consult discharge planning    Preferred Language English       Contact Information    Permission Granted to Share Info With family/designee              Functional Status       Row Name 09/19/23 1106       Functional Status    Usual Activity Tolerance good    Current Activity Tolerance good       Functional Status, IADL    Medications independent    Meal Preparation independent    Housekeeping independent    Laundry independent    Shopping independent       Mental Status    General Appearance WDL WDL       Mental Status Summary    Recent Changes in Mental Status/Cognitive Functioning no changes              Psychosocial       Row Name 09/19/23 1107       Coping/Stress    Sources of Support spouse    Techniques to [place] with Loss/Stress/Change not applicable    Reaction to Health Status accepting       Developmental Stage (Eriksson's)    Developmental Stage Stage 7 (35-65 years/Middle Adulthood) Generativity vs. Stagnation           ABHISHEK Frias

## 2023-09-19 NOTE — DISCHARGE SUMMARY
[facility]         HOSPITALIST  DISCHARGE SUMMARY    Patient Name: Kurt Balbuena  : 1985  MRN: [id number]    Date of Admission: 2023  Date of Discharge:  2023  Primary Care Physician: Dalila Moon APRN    Active and Resolved Hospital Problems:  Seizure disorder  Alcohol use  Hypertension  Obesity    Hospital Course     Hospital Course:  Kurt Balbuena is a 38 y.o. male brought to the emergency department for evaluation of recurrent seizure disorders.  Patient was seen in our emergency department 24 hours prior, similar issues.  Patient decided to leave AMA at that time.  Original concern was for breakthrough seizures.  However, patient states he has not taken his Keppra as prescribed.  Keppra was prescribed 500 mg twice daily, however patient was only taking 250 milligrams at night.  This is secondary to feelings of lightheaded and dizziness of the Keppra.  Original plan was for admission and monitoring, EEG and MRI.  However patient is unable to tolerate MRI due to claustrophobia, refusing Ativan or any other medications to help.  Case was discussed with patient's neurologist, patient was transition to Lamictal 100 mg twice daily in an attempt to bypass symptoms of dizziness.  Patient has a scheduled follow-up appointment with neurology in 10 days, neurologist comfortable with patient discharging, patient evaluated by hospitalist and felt safe to discharge home.  Patient did receive 500 mg of Keppra morning of discharge.  Patient seen on date of discharge, clinically and hemodynamically stable.  Patient provided concerning signs and symptoms prompting immediate medical attention, patient understanding and agreeable    DISCHARGE Follow Up Recommendations for labs and diagnostics:   Follow-up with neurologist as scheduled  Transitioned to Lamictal, take this medication as prescribed      Day of Discharge     Vital Signs:  Temp:  [97.8 °F (36.6 °C)-99.1 °F (37.3 °C)] 97.8  °F (36.6 °C)  Heart Rate:  [] 91  Resp:  [16] 16  BP: (105-144)/() 141/105  Physical Exam:               Constitutional: Awake, alert, no acute distress              Eyes: Pupils equal, sclerae anicteric, no conjunctival injection              HENT: NCAT, mucous membranes moist              Neck: Supple, no thyromegaly, no lymphadenopathy, trachea midline              Respiratory: Clear to auscultation bilaterally, nonlabored respirations               Cardiovascular: RRR, no murmurs, rubs, or gallops, palpable pedal pulses bilaterally              Gastrointestinal: Positive bowel sounds, soft, nontender, nondistended              Musculoskeletal: No bilateral ankle edema, no clubbing or cyanosis to extremities              Psychiatric: Appropriate affect, cooperative              Neurologic: Oriented x 3, strength symmetric in all extremities, Cranial Nerves grossly intact to confrontation, speech clear              Skin: No rashes        Discharge Details        Discharge Medications        New Medications        Instructions Start Date   folic acid 1 MG tablet  Commonly known as: FOLVITE   1 mg, Oral, Daily   Start Date: September 20, 2023     lamoTRIgine 100 MG tablet  Commonly known as: LaMICtal   100 mg, Oral, Every 12 Hours Scheduled      thiamine 100 MG tablet  Commonly known as: VITAMIN B1   100 mg, Oral, Daily   Start Date: September 24, 2023            Stop These Medications      levETIRAcetam 1000 MG tablet  Commonly known as: KEPPRA              Allergies   Allergen Reactions   • Benadryl [Diphenhydramine] Hallucinations       Discharge Disposition:  Home or Self Care    Diet:  Hospital:  Diet Order   Procedures   • Diet: Regular/House Diet; Texture: Regular Texture (IDDSI 7); Fluid Consistency: Thin (IDDSI 0)       Discharge Activity:   Activity Instructions       Activity as Tolerated              CODE STATUS:  Code Status and Medical Interventions:   Ordered at: 09/19/23 0244     Level Of  Support Discussed With:    Patient     Code Status (Patient has no pulse and is not breathing):    CPR (Attempt to Resuscitate)     Medical Interventions (Patient has pulse or is breathing):    Full Support         No future appointments.    Additional Instructions for the Follow-ups that You Need to Schedule       Discharge Follow-up with PCP   As directed       Currently Documented PCP:    Dalila Moon APRN    PCP Phone Number:    [phone]     Follow Up Details: In less than one week        Discharge Follow-up with Specified Provider: Dr Starks   As directed      To: Dr Starks   Follow Up Details: Scheduled to follow-up on the 29th Pertinent  and/or Most Recent Results     PROCEDURES:   NA    LAB RESULTS:      Lab 09/19/23  0543 09/18/23  2143 09/17/23 2123 09/14/23 0026 09/12/23 2213   WBC 4.55 6.84 7.12 7.54 6.75   HEMOGLOBIN 17.2 17.5 18.7* 19.3* 17.5   HEMATOCRIT 48.7 49.5 52.6* 53.9* 50.4   PLATELETS 142 142 150 166 155   NEUTROS ABS 2.23 3.64 4.04 4.25 3.86   IMMATURE GRANS (ABS) 0.02 0.02 0.02 0.03 0.04   LYMPHS ABS 1.53 2.02 2.09 2.38 1.97   MONOS ABS 0.47 0.69 0.53 0.38 0.44   EOS ABS 0.23 0.37 0.34 0.37 0.33   MCV 91.2 90.5 90.4 90.0 89.2   PROTIME  --   --  14.4  --   --          Lab 09/19/23  0542 09/19/23 0034 09/17/23  2123 09/15/23  2254 09/14/23  0026   SODIUM 140 142 143 141 141   POTASSIUM 3.7 3.9 3.8 3.9 3.8   CHLORIDE 103 105 105 102 103   CO2 23.9 22.8 26.1 25.5 25.9   ANION GAP 13.1 14.2 11.9 13.5 12.1   BUN 7 6 5* 7 6   CREATININE 0.88 0.97 0.94 0.96 0.97   EGFR 111.5 101.2 105.1 102.5 101.2   GLUCOSE 126* 100* 118* 89 142*   CALCIUM 9.5 9.0 9.6 9.7 9.5   MAGNESIUM 2.0 2.2 2.5  --  2.5   PHOSPHORUS 3.5  --   --   --   --    HEMOGLOBIN A1C 5.00  --   --   --   --    TSH  --  2.340  --   --   --          Lab 09/19/23  0034 09/17/23  2123 09/15/23  2254 09/14/23  0026 09/12/23  2213   TOTAL PROTEIN 6.9 7.6 7.1 8.3 7.5   ALBUMIN 4.0 4.5 4.1 4.5 4.3   GLOBULIN 2.9  3.1 3.0 3.8 3.2   ALT (SGPT) 72* 70* 45* 69* 61*   AST (SGOT) 71* 75* 45* 79* 75*   BILIRUBIN 0.6 1.0 0.9 1.1 1.0   ALK PHOS 72 81 78 93 77         Lab 09/19/23  0034 09/17/23 2123 09/14/23  0026   HSTROP T <6 <6 <6   PROTIME  --  14.4  --    INR  --  1.11  --                  Brief Urine Lab Results  (Last result in the past 365 days)        Color   Clarity   Blood   Leuk Est   Nitrite   Protein   CREAT   Urine HCG        09/18/23 2157 Yellow   Cloudy   Negative   Negative   Negative   Negative                 Microbiology Results (last 10 days)       ** No results found for the last 240 hours. **            CT Head Without Contrast    Result Date: 9/13/2023  Impression:   1. No evidence for acute intracranial abnormality. 2. A small scalp hematoma is noted measuring 2.8 cm overlying the left posterior superior margin of the skull.    PIPE FIERRO MD       Electronically Signed and Approved By: PIPE FIERRO MD on 9/13/2023 at 0:46             CT Head Without Contrast    Result Date: 9/5/2023  Impression:  No acute brain abnormality is seen. Specifically, no acute intracranial hemorrhage, no acute infarct, no significant intracranial mass lesion, and no acute intracranial mass effect are appreciated.  Please see above comments for further detail.    Please note that portions of this note were completed with a voice recognition program.  ASHLEY HONEYCUTT JR, MD       Electronically Signed and Approved By: ASHLEY HONEYCUTT JR, MD on 9/05/2023 at 4:21              XR Chest 1 View    Result Date: 9/18/2023  Impression:   No acute infiltrate is appreciated.     Please note that portions of this note were completed with a voice recognition program.  ASHLEY HONEYCUTT JR, MD       Electronically Signed and Approved By: ASHLEY HONEYCUTT JR, MD on 9/18/2023 at 22:41              XR Chest 1 View    Result Date: 9/17/2023  Impression:  No acute cardiopulmonary disease is seen radiographically.     Please note that portions of this  note were completed with a voice recognition program.  ASHLEY HONEYCUTT JR, MD       Electronically Signed and Approved By: ASHLEY HONEYCUTT JR, MD on 9/17/2023 at 22:00              XR Chest 1 View    Result Date: 9/14/2023  Impression:   1. No change from the previous study with no evidence for acute cardiopulmonary process.         PIPE FIERRO MD       Electronically Signed and Approved By: PIPE FIERRO MD on 9/14/2023 at 0:45                          Labs Pending at Discharge:  Pending Labs       Order Current Status    Levetiracetam Level (Keppra) In process              Time spent on Discharge including face to face service:  36 minutes    Electronically signed by Norberto Xiong MD, 09/19/23, 4:49 PM EDT.

## 2023-09-19 NOTE — ED NOTES
Seizures dx x 3 weeks, started Keppra, neuro appt on 9/29.  Consistently having seizures every day.  Wife picked up from tae's house-counted 12 with short postictal period.  Collapsed when getting out of the vehicle-EMS states patient did help get himself to the stretcher    BG 71, 1/2 amp d50.  BG 99 when rechecked by EMS    Feels weak and shaky.    Was here last night but left AMA

## 2023-09-19 NOTE — PROGRESS NOTES
" [facility]   Hospitalist Progress Note  Date: 2023  Patient Name: Kurt Balbuena  : 1985  MRN: [id number]  Date of admission: 2023      Subjective   Subjective     Chief Complaint:   Seizures    Summary:   Kurt Balbuena is a 38 y.o. male brought to the emergency department for evaluation of recurrent seizure disorders.  Patient was seen in our emergency department 24 hours prior, similar issues.  Patient decided to leave AMA at that time.  Patient has had approximately 15 ER visits this year for similar issues.  Patient states he has taken his Keppra as prescribed daily.  Seizure was witnessed by his wife.  Reportedly lasted 3 to 5 minutes.  Described as tonic-clonic.  No loss of control of bowel or bladder function.  Patient reports his seizure disorder has been controlled up till this month, has been able to see neurology until .  Patient states he has been drinking alcohol.  Patient is unable to quantify how much.  Patient reports poor quality sleep, some nights, patient states \"I will stay up all night\".  Patient was scheduled for sleep study, but did not go to the appointment.     Interval Followup:   Patient was admitted for work-up concerning for breakthrough seizures.  However discussion with patient he has not been taking his Keppra as prescribed.  He was to be taking 500 twice daily.  However due to side effects of dizziness, patient has been taking 250 mg of Keppra nightly and not taking any other doses.  Discussed with patient's neurology and we will transition patient to Lamictal 100 twice daily, patient still has follow-up with neurology in 10 days.  Consult was placed for neurology and will see him patient, also ordering MRI to ensure no other etiology for patient's increased frequency of seizure-like activity.    Objective   Objective     Vitals:   Temp:  [97.8 °F (36.6 °C)-99.1 °F (37.3 °C)] 99.1 °F (37.3 °C)  Heart Rate:  [] 87  Resp:  [16] 16  BP: " (105-144)/() 144/97  Physical Exam    Constitutional: Awake, alert, no acute distress   Eyes: Pupils equal, sclerae anicteric, no conjunctival injection   HENT: NCAT, mucous membranes moist   Neck: Supple, no thyromegaly, no lymphadenopathy, trachea midline   Respiratory: Clear to auscultation bilaterally, nonlabored respirations    Cardiovascular: RRR, no murmurs, rubs, or gallops, palpable pedal pulses bilaterally   Gastrointestinal: Positive bowel sounds, soft, nontender, nondistended   Musculoskeletal: No bilateral ankle edema, no clubbing or cyanosis to extremities   Psychiatric: Appropriate affect, cooperative   Neurologic: Oriented x 3, strength symmetric in all extremities, Cranial Nerves grossly intact to confrontation, speech clear   Skin: No rashes     Result Review    Result Review:  I have personally reviewed the results from 9/19/2023 and agree with these findings:  []  Laboratory  []  Microbiology  []  Radiology  []  EKG/Telemetry   []  Cardiology/Vascular   []  Pathology  []  Old records  []  Other:    Assessment & Plan   Assessment / Plan     Assessment/Plan:  Seizure disorder  Alcohol use  Hypertension  Obesity    Plan:  Patient remains admitted to the hospital for further care management  Discussed with the patient's neurologist we will transition patient to Lamictal 100 twice daily  MRIs been ordered to ensure no other etiologies for increased frequency of seizure-like activity  Patient remains on CIWA protocol at this time  On CIWA protocol patient continues on folate and thiamine  EEG ordered, will follow-up     Discussed plan with RN, neurology    DVT prophylaxis:  Medical DVT prophylaxis orders are present.    CODE STATUS:   Level Of Support Discussed With: Patient  Code Status (Patient has no pulse and is not breathing): CPR (Attempt to Resuscitate)  Medical Interventions (Patient has pulse or is breathing): Full Support

## 2023-09-20 ENCOUNTER — HOSPITAL ENCOUNTER (EMERGENCY)
Facility: HOSPITAL | Age: 38
Discharge: HOME | End: 2023-09-21
Attending: EMERGENCY MEDICINE
Payer: COMMERCIAL

## 2023-09-20 DIAGNOSIS — G40.919 BREAKTHROUGH SEIZURE: Primary | ICD-10-CM

## 2023-09-20 LAB
ALBUMIN SERPL-MCNC: 4.3 G/DL (ref 3.5–5.2)
ALBUMIN/GLOB SERPL: 1.5 G/DL
ALP SERPL-CCNC: 72 U/L (ref 39–117)
ALT SERPL W P-5'-P-CCNC: 86 U/L (ref 1–41)
ANION GAP SERPL CALCULATED.3IONS-SCNC: 12.1 MMOL/L (ref 5–15)
AST SERPL-CCNC: 83 U/L (ref 1–40)
BASOPHILS # BLD AUTO: 0.09 10*3/MM3 (ref 0–0.2)
BASOPHILS NFR BLD AUTO: 1.3 % (ref 0–1.5)
BILIRUB SERPL-MCNC: 1.3 MG/DL (ref 0–1.2)
BUN SERPL-MCNC: 6 MG/DL (ref 6–20)
BUN/CREAT SERPL: 5.7 (ref 7–25)
CALCIUM SPEC-SCNC: 9.4 MG/DL (ref 8.6–10.5)
CHLORIDE SERPL-SCNC: 104 MMOL/L (ref 98–107)
CO2 SERPL-SCNC: 24.9 MMOL/L (ref 22–29)
CREAT SERPL-MCNC: 1.06 MG/DL (ref 0.76–1.27)
DEPRECATED RDW RBC AUTO: 47.6 FL (ref 37–54)
EGFRCR SERPLBLD CKD-EPI 2021: 91 ML/MIN/1.73
EOSINOPHIL # BLD AUTO: 0.3 10*3/MM3 (ref 0–0.4)
EOSINOPHIL NFR BLD AUTO: 4.3 % (ref 0.3–6.2)
ERYTHROCYTE [DISTWIDTH] IN BLOOD BY AUTOMATED COUNT: 14.1 % (ref 12.3–15.4)
GLOBULIN UR ELPH-MCNC: 2.8 GM/DL
GLUCOSE SERPL-MCNC: 90 MG/DL (ref 65–99)
HCT VFR BLD AUTO: 49.8 % (ref 37.5–51)
HGB BLD-MCNC: 17 G/DL (ref 13–17.7)
HOLD SPECIMEN: NORMAL
HOLD SPECIMEN: NORMAL
IMM GRANULOCYTES # BLD AUTO: 0.03 10*3/MM3 (ref 0–0.05)
IMM GRANULOCYTES NFR BLD AUTO: 0.4 % (ref 0–0.5)
LEVETIRACETAM SERPL-MCNC: 3.9 UG/ML (ref 10–40)
LYMPHOCYTES # BLD AUTO: 1.6 10*3/MM3 (ref 0.7–3.1)
LYMPHOCYTES NFR BLD AUTO: 23 % (ref 19.6–45.3)
MAGNESIUM SERPL-MCNC: 2.3 MG/DL (ref 1.6–2.6)
MCH RBC QN AUTO: 31.2 PG (ref 26.6–33)
MCHC RBC AUTO-ENTMCNC: 34.1 G/DL (ref 31.5–35.7)
MCV RBC AUTO: 91.4 FL (ref 79–97)
MONOCYTES # BLD AUTO: 0.63 10*3/MM3 (ref 0.1–0.9)
MONOCYTES NFR BLD AUTO: 9 % (ref 5–12)
NEUTROPHILS NFR BLD AUTO: 4.32 10*3/MM3 (ref 1.7–7)
NEUTROPHILS NFR BLD AUTO: 62 % (ref 42.7–76)
NRBC BLD AUTO-RTO: 0 /100 WBC (ref 0–0.2)
PLATELET # BLD AUTO: 120 10*3/MM3 (ref 140–450)
PMV BLD AUTO: 9.3 FL (ref 6–12)
POTASSIUM SERPL-SCNC: 3.3 MMOL/L (ref 3.5–5.2)
PROT SERPL-MCNC: 7.1 G/DL (ref 6–8.5)
QT INTERVAL: 338 MS
QTC INTERVAL: 436 MS
RBC # BLD AUTO: 5.45 10*6/MM3 (ref 4.14–5.8)
SODIUM SERPL-SCNC: 141 MMOL/L (ref 136–145)
TROPONIN T SERPL HS-MCNC: <6 NG/L
WBC NRBC COR # BLD: 6.97 10*3/MM3 (ref 3.4–10.8)
WHOLE BLOOD HOLD COAG: NORMAL
WHOLE BLOOD HOLD SPECIMEN: NORMAL

## 2023-09-20 PROCEDURE — 93005 ELECTROCARDIOGRAM TRACING: CPT | Performed by: EMERGENCY MEDICINE

## 2023-09-20 PROCEDURE — 93010 ELECTROCARDIOGRAM REPORT: CPT | Performed by: INTERNAL MEDICINE

## 2023-09-20 PROCEDURE — 93005 ELECTROCARDIOGRAM TRACING: CPT

## 2023-09-20 PROCEDURE — 84484 ASSAY OF TROPONIN QUANT: CPT

## 2023-09-20 PROCEDURE — 83735 ASSAY OF MAGNESIUM: CPT

## 2023-09-20 PROCEDURE — 85025 COMPLETE CBC W/AUTO DIFF WBC: CPT

## 2023-09-20 PROCEDURE — 80053 COMPREHEN METABOLIC PANEL: CPT

## 2023-09-20 PROCEDURE — 99283 EMERGENCY DEPT VISIT LOW MDM: CPT

## 2023-09-20 RX ORDER — SODIUM CHLORIDE 0.9 % (FLUSH) 0.9 %
10 SYRINGE (ML) INJECTION AS NEEDED
Status: DISCONTINUED | OUTPATIENT
Start: 2023-09-20 | End: 2023-09-21 | Stop reason: HOSPADM

## 2023-09-20 NOTE — OUTREACH NOTE
Prep Survey      Flowsheet Row Responses   [facility] facility patient discharged from? [facility]   Is LACE score < 7 ? No   Eligibility Readm Mgmt   Discharge diagnosis Seizure   Does the patient have one of the following disease processes/diagnoses(primary or secondary)? Other   Does the patient have Home health ordered? No   Is there a DME ordered? No   Medication alerts for this patient See AVS   Prep survey completed? Yes            Kary MATHUR - Registered Nurse

## 2023-09-21 VITALS
BODY MASS INDEX: 41.47 KG/M2 | RESPIRATION RATE: 20 BRPM | SYSTOLIC BLOOD PRESSURE: 118 MMHG | DIASTOLIC BLOOD PRESSURE: 77 MMHG | OXYGEN SATURATION: 91 % | WEIGHT: 279.98 LBS | HEART RATE: 93 BPM | HEIGHT: 69 IN | TEMPERATURE: 98.1 F

## 2023-09-21 LAB
LEVETIRACETAM SERPL-MCNC: 11.6 UG/ML (ref 10–40)
QT INTERVAL: 332 MS
QT INTERVAL: 336 MS
QTC INTERVAL: 437 MS
QTC INTERVAL: 447 MS

## 2023-09-21 NOTE — ED PROVIDER NOTES
Time: 1:24 AM EDT  Date of encounter:  9/20/2023  Independent Historian/Clinical History and Information was obtained by:   Patient    History is limited by: N/A    Chief Complaint: Seizure      History of Present Illness:  Patient is a 38 y.o. year old male who presents to the emergency department for evaluation of seizure.  Patient has a history of recurrent seizures as well as hypertension, alcoholism.  Was just admitted 2 days ago for recurrent seizures.  Left AMA yesterday.  Presents back for a recurrent seizure.  Does report he did have alcohol today.  No other complaints at this time.    HPI    Patient Care Team  Primary Care Provider: Dalila Moon APRN    Past Medical History:     Allergies   Allergen Reactions    Benadryl [Diphenhydramine] Hallucinations     Past Medical History:   Diagnosis Date    Hernia of abdominal wall     Hypertension     MI (myocardial infarction)     Seizures      Past Surgical History:   Procedure Laterality Date    APPENDECTOMY       History reviewed. No pertinent family history.    Home Medications:  Prior to Admission medications    Medication Sig Start Date End Date Taking? Authorizing Provider   folic acid (FOLVITE) 1 MG tablet Take 1 tablet by mouth Daily for 30 days. 9/20/23 10/20/23  Norberto Xiong MD   lamoTRIgine (LaMICtal) 100 MG tablet Take 1 tablet by mouth Every 12 (Twelve) Hours for 30 days. 9/19/23 10/19/23  Norberto Xiong MD   thiamine (VITAMIN B1) 100 MG tablet Take 1 tablet by mouth Daily for 30 days. 9/24/23 10/24/23  Norberto Xiong MD        Social History:   Social History     Tobacco Use    Smoking status: Never     Passive exposure: Never    Smokeless tobacco: Current     Types: Snuff   Vaping Use    Vaping Use: Never used   Substance Use Topics    Alcohol use: Yes     Alcohol/week: 8.0 standard drinks     Types: 8 Cans of beer per week     Comment: occ    Drug use: Never         Review of Systems:  Review of Systems   Neurological:  Positive for  "seizures.      Physical Exam:  /77   Pulse 93   Temp 98.1 °F (36.7 °C) (Oral)   Resp 20   Ht 175.3 cm (69\")   Wt 127 kg (279 lb 15.8 oz)   SpO2 91%   BMI 41.35 kg/m²     Physical Exam  Vitals and nursing note reviewed.   Constitutional:       Appearance: Normal appearance.   HENT:      Head: Normocephalic and atraumatic.   Eyes:      General: No scleral icterus.  Cardiovascular:      Rate and Rhythm: Normal rate and regular rhythm.   Pulmonary:      Effort: Pulmonary effort is normal.      Breath sounds: Normal breath sounds.   Abdominal:      Palpations: Abdomen is soft.      Tenderness: There is no abdominal tenderness.   Musculoskeletal:         General: Normal range of motion.      Cervical back: Normal range of motion.   Skin:     Findings: No rash.   Neurological:      General: No focal deficit present.      Mental Status: He is alert.                Procedures:  Procedures      Medical Decision Making:      Comorbidities that affect care:    Seizures, Hypertension, Substance Abuse    External Notes reviewed:    Reviewed discharge summary from 9/19/2023      The following orders were placed and all results were independently analyzed by me:  Orders Placed This Encounter   Procedures    [place] Draw    Comprehensive Metabolic Panel    Magnesium    Single High Sensitivity Troponin T    CBC Auto Differential    NPO Diet NPO Type: Strict NPO    Undress & Gown    Continuous Pulse Oximetry    Vital Signs    Orthostatic Blood Pressure    Oxygen Therapy- Nasal Cannula; Titrate 1-6 LPM Per SpO2; 90 - 95%    POC Glucose Once    ECG 12 Lead ED Triage Standing Order; Syncope    Insert Peripheral IV    CBC & Differential    Green Top (Gel)    Lavender Top    Gold Top - SST    Light Blue Top       Medications Given in the Emergency Department:  Medications   sodium chloride 0.9 % flush 10 mL (has no administration in time range)        ED Course:    ED Course as of 09/21/23 0131   Thu Sep 21, 2023   0129 EKG " interpreted by me  Time 2140  Heart rate 104  Sinus tach, normal QRS, normal axis, no acute ischemia [MA]      ED Course User Index  [MA] Augustin Banegas MD       Labs:    Lab Results (last 24 hours)       Procedure Component Value Units Date/Time    CBC & Differential [[id number]]  (Abnormal) Collected: 09/20/23 2140    Specimen: Blood Updated: 09/20/23 2220    Narrative:      The following orders were created for panel order CBC & Differential.  Procedure                               Abnormality         Status                     ---------                               -----------         ------                     CBC Auto Differential[[id number]]        Abnormal            Final result               Scan Slide[[id number]]                                                                    Please view results for these tests on the individual orders.    Comprehensive Metabolic Panel [[id number]]  (Abnormal) Collected: 09/20/23 2140    Specimen: Blood Updated: 09/20/23 2229     Glucose 90 mg/dL      BUN 6 mg/dL      Creatinine 1.06 mg/dL      Sodium 141 mmol/L      Potassium 3.3 mmol/L      Chloride 104 mmol/L      CO2 24.9 mmol/L      Calcium 9.4 mg/dL      Total Protein 7.1 g/dL      Albumin 4.3 g/dL      ALT (SGPT) 86 U/L      AST (SGOT) 83 U/L      Alkaline Phosphatase 72 U/L      Total Bilirubin 1.3 mg/dL      Globulin 2.8 gm/dL      A/G Ratio 1.5 g/dL      BUN/Creatinine Ratio 5.7     Anion Gap 12.1 mmol/L      eGFR 91.0 mL/min/1.73     Narrative:      GFR Normal >60  Chronic Kidney Disease <60  Kidney Failure <15      Magnesium [[id number]]  (Normal) Collected: 09/20/23 2140    Specimen: Blood Updated: 09/20/23 2229     Magnesium 2.3 mg/dL     Single High Sensitivity Troponin T [[id number]]  (Normal) Collected: 09/20/23 2140    Specimen: Blood Updated: 09/20/23 2229     HS Troponin T <6 ng/L     Narrative:      High Sensitive Troponin T Reference Range:  <10.0 ng/L- Negative Female for AMI  <15.0 ng/L-  Negative Male for AMI  >=10 - Abnormal Female indicating possible myocardial injury.  >=15 - Abnormal Male indicating possible myocardial injury.   Clinicians would have to utilize clinical acumen, EKG, Troponin, and serial changes to determine if it is an Acute Myocardial Infarction or myocardial injury due to an underlying chronic condition.         CBC Auto Differential [[id number]]  (Abnormal) Collected: 09/20/23 2140    Specimen: Blood Updated: 09/20/23 2220     WBC 6.97 10*3/mm3      RBC 5.45 10*6/mm3      Hemoglobin 17.0 g/dL      Hematocrit 49.8 %      MCV 91.4 fL      MCH 31.2 pg      MCHC 34.1 g/dL      RDW 14.1 %      RDW-SD 47.6 fl      MPV 9.3 fL      Platelets 120 10*3/mm3      Neutrophil % 62.0 %      Lymphocyte % 23.0 %      Monocyte % 9.0 %      Eosinophil % 4.3 %      Basophil % 1.3 %      Immature Grans % 0.4 %      Neutrophils, Absolute 4.32 10*3/mm3      Lymphocytes, Absolute 1.60 10*3/mm3      Monocytes, Absolute 0.63 10*3/mm3      Eosinophils, Absolute 0.30 10*3/mm3      Basophils, Absolute 0.09 10*3/mm3      Immature Grans, Absolute 0.03 10*3/mm3      nRBC 0.0 /100 WBC              Imaging:    No Radiology Exams Resulted Within Past 24 Hours      Differential Diagnosis and Discussion:    Seizure: Differential diagnosis includes but is not limited to meningitis, hypoglycemia, electrolyte abnormalities, intracranial hemorrhage, toxin induced, and pseudoseizure.    All labs were reviewed and interpreted by me.    MDM     Patient presents with seizure.  Has a known seizure disorder as well as alcohol withdrawal seizures.  He is well-appearing at this time with unremarkable labs.  He just signed out AMA yesterday for the same.  Appears to be at his baseline.  He is with family.  Will DC.      Patient Care Considerations:          Consultants/Shared Management Plan:    None    Social Determinants of Health:    Patient has presented with family members who are responsible, reliable and will ensure  follow up care.      Disposition and Care Coordination:    Discharged: The patient is suitable and stable for discharge with no need for consideration of observation or admission.    I have explained the patient´s condition, diagnoses and treatment plan based on the information available to me at this time. I have answered questions and addressed any concerns. The patient has a good  understanding of the patient´s diagnosis, condition, and treatment plan as can be expected at this point. The vital signs have been stable. The patient´s condition is stable and appropriate for discharge from the emergency department.      The patient will pursue further outpatient evaluation with the primary care physician or other designated or consulting physician as outlined in the discharge instructions. They are agreeable to this plan of care and follow-up instructions have been explained in detail. The patient has received these instructions in written format and have expressed an understanding of the discharge instructions. The patient is aware that any significant change in condition or worsening of symptoms should prompt an immediate return to this or the closest emergency department or call to 911.      Final diagnoses:   Breakthrough seizure        ED Disposition       ED Disposition   Discharge    Condition   Stable    Comment   --               This medical record created using voice recognition software.             Augustin Banegas MD  09/21/23 0131

## 2023-09-25 ENCOUNTER — HOSPITAL ENCOUNTER (EMERGENCY)
Facility: HOSPITAL | Age: 38
Discharge: HOME | End: 2023-09-26
Attending: EMERGENCY MEDICINE | Admitting: EMERGENCY MEDICINE
Payer: COMMERCIAL

## 2023-09-25 DIAGNOSIS — R56.9 SEIZURE: Primary | ICD-10-CM

## 2023-09-25 LAB
ALBUMIN SERPL-MCNC: 4.4 G/DL (ref 3.5–5.2)
ALBUMIN/GLOB SERPL: 1.4 G/DL
ALP SERPL-CCNC: 80 U/L (ref 39–117)
ALT SERPL W P-5'-P-CCNC: 107 U/L (ref 1–41)
AMPHET+METHAMPHET UR QL: NEGATIVE
ANION GAP SERPL CALCULATED.3IONS-SCNC: 13.2 MMOL/L (ref 5–15)
AST SERPL-CCNC: 97 U/L (ref 1–40)
BARBITURATES UR QL SCN: NEGATIVE
BASOPHILS # BLD AUTO: 0.13 10*3/MM3 (ref 0–0.2)
BASOPHILS NFR BLD AUTO: 1.8 % (ref 0–1.5)
BENZODIAZ UR QL SCN: NEGATIVE
BILIRUB SERPL-MCNC: 0.8 MG/DL (ref 0–1.2)
BILIRUB UR QL STRIP: NEGATIVE
BUN SERPL-MCNC: 5 MG/DL (ref 6–20)
BUN/CREAT SERPL: 5 (ref 7–25)
CALCIUM SPEC-SCNC: 9.9 MG/DL (ref 8.6–10.5)
CANNABINOIDS SERPL QL: NEGATIVE
CHLORIDE SERPL-SCNC: 105 MMOL/L (ref 98–107)
CLARITY UR: CLEAR
CO2 SERPL-SCNC: 23.8 MMOL/L (ref 22–29)
COCAINE UR QL: NEGATIVE
COLOR UR: YELLOW
CREAT SERPL-MCNC: 1 MG/DL (ref 0.76–1.27)
DEPRECATED RDW RBC AUTO: 46.5 FL (ref 37–54)
EGFRCR SERPLBLD CKD-EPI 2021: 97.6 ML/MIN/1.73
EOSINOPHIL # BLD AUTO: 0.34 10*3/MM3 (ref 0–0.4)
EOSINOPHIL NFR BLD AUTO: 4.7 % (ref 0.3–6.2)
ERYTHROCYTE [DISTWIDTH] IN BLOOD BY AUTOMATED COUNT: 14 % (ref 12.3–15.4)
ETHANOL BLD-MCNC: 250 MG/DL (ref 0–10)
ETHANOL UR QL: 0.25 %
FENTANYL UR-MCNC: NEGATIVE NG/ML
GLOBULIN UR ELPH-MCNC: 3.2 GM/DL
GLUCOSE SERPL-MCNC: 104 MG/DL (ref 65–99)
GLUCOSE UR STRIP-MCNC: NEGATIVE MG/DL
HCT VFR BLD AUTO: 49.2 % (ref 37.5–51)
HGB BLD-MCNC: 17.7 G/DL (ref 13–17.7)
HGB UR QL STRIP.AUTO: NEGATIVE
HOLD SPECIMEN: NORMAL
HOLD SPECIMEN: NORMAL
IMM GRANULOCYTES # BLD AUTO: 0.02 10*3/MM3 (ref 0–0.05)
IMM GRANULOCYTES NFR BLD AUTO: 0.3 % (ref 0–0.5)
KETONES UR QL STRIP: NEGATIVE
LEUKOCYTE ESTERASE UR QL STRIP.AUTO: NEGATIVE
LYMPHOCYTES # BLD AUTO: 2.14 10*3/MM3 (ref 0.7–3.1)
LYMPHOCYTES NFR BLD AUTO: 29.4 % (ref 19.6–45.3)
MCH RBC QN AUTO: 32.4 PG (ref 26.6–33)
MCHC RBC AUTO-ENTMCNC: 36 G/DL (ref 31.5–35.7)
MCV RBC AUTO: 89.9 FL (ref 79–97)
METHADONE UR QL SCN: NEGATIVE
MONOCYTES # BLD AUTO: 0.77 10*3/MM3 (ref 0.1–0.9)
MONOCYTES NFR BLD AUTO: 10.6 % (ref 5–12)
NEUTROPHILS NFR BLD AUTO: 3.88 10*3/MM3 (ref 1.7–7)
NEUTROPHILS NFR BLD AUTO: 53.2 % (ref 42.7–76)
NITRITE UR QL STRIP: NEGATIVE
NRBC BLD AUTO-RTO: 0 /100 WBC (ref 0–0.2)
OPIATES UR QL: NEGATIVE
OXYCODONE UR QL SCN: NEGATIVE
PH UR STRIP.AUTO: 6 [PH] (ref 5–8)
PLATELET # BLD AUTO: 144 10*3/MM3 (ref 140–450)
PMV BLD AUTO: 9.3 FL (ref 6–12)
POTASSIUM SERPL-SCNC: 3.6 MMOL/L (ref 3.5–5.2)
PROT SERPL-MCNC: 7.6 G/DL (ref 6–8.5)
PROT UR QL STRIP: NEGATIVE
RBC # BLD AUTO: 5.47 10*6/MM3 (ref 4.14–5.8)
SODIUM SERPL-SCNC: 142 MMOL/L (ref 136–145)
SP GR UR STRIP: <=1.005 (ref 1–1.03)
UROBILINOGEN UR QL STRIP: NORMAL
WBC NRBC COR # BLD: 7.28 10*3/MM3 (ref 3.4–10.8)
WHOLE BLOOD HOLD COAG: NORMAL
WHOLE BLOOD HOLD SPECIMEN: NORMAL

## 2023-09-25 PROCEDURE — 80307 DRUG TEST PRSMV CHEM ANLYZR: CPT

## 2023-09-25 PROCEDURE — 36415 COLL VENOUS BLD VENIPUNCTURE: CPT

## 2023-09-25 PROCEDURE — 81003 URINALYSIS AUTO W/O SCOPE: CPT

## 2023-09-25 PROCEDURE — 85025 COMPLETE CBC W/AUTO DIFF WBC: CPT

## 2023-09-25 PROCEDURE — 82077 ASSAY SPEC XCP UR&BREATH IA: CPT

## 2023-09-25 PROCEDURE — 80053 COMPREHEN METABOLIC PANEL: CPT

## 2023-09-25 PROCEDURE — 99283 EMERGENCY DEPT VISIT LOW MDM: CPT

## 2023-09-26 VITALS
HEART RATE: 100 BPM | SYSTOLIC BLOOD PRESSURE: 105 MMHG | OXYGEN SATURATION: 97 % | BODY MASS INDEX: 42.33 KG/M2 | DIASTOLIC BLOOD PRESSURE: 69 MMHG | HEIGHT: 68 IN | WEIGHT: 279.32 LBS | RESPIRATION RATE: 18 BRPM | TEMPERATURE: 98 F

## 2023-09-26 PROCEDURE — 80175 DRUG SCREEN QUAN LAMOTRIGINE: CPT | Performed by: EMERGENCY MEDICINE

## 2023-09-26 RX ORDER — LAMOTRIGINE 100 MG/1
100 TABLET ORAL ONCE
Status: COMPLETED | OUTPATIENT
Start: 2023-09-26 | End: 2023-09-26

## 2023-09-26 RX ADMIN — LAMOTRIGINE 100 MG: 100 TABLET ORAL at 03:25

## 2023-09-26 NOTE — ED PROVIDER NOTES
Time: 10:02 PM EDT  Date of encounter:  9/25/2023  Independent Historian/Clinical History and Information was obtained by:   Patient    History is limited by: N/A    Chief Complaint   Patient presents with    Seizures         History of Present Illness:  Patient is a 38 y.o. year old male who presents to the emergency department for evaluation of seizures.  WPatient has a history of seizures.  He was recently switched to Lamictal.  He states has been taking his medication as prescribed.  Reports seizure last about 10 minutes.  He had a few seizures back-to-back.  Does admit to drinking alcohol.  He states he gets seizures whether he is drinking or not.  He has appointment coming up with neurology on 9/29/2023.      HPI    Patient Care Team  Primary Care Provider: Dalila Moon APRN    Past Medical History:     Allergies   Allergen Reactions    Benadryl [Diphenhydramine] Hallucinations     Past Medical History:   Diagnosis Date    Hernia of abdominal wall     Hypertension     MI (myocardial infarction)     Seizures      Past Surgical History:   Procedure Laterality Date    APPENDECTOMY       History reviewed. No pertinent family history.    Home Medications:  Prior to Admission medications    Medication Sig Start Date End Date Taking? Authorizing Provider   folic acid (FOLVITE) 1 MG tablet Take 1 tablet by mouth Daily for 30 days. 9/20/23 10/20/23  Norberto Xiong MD   lamoTRIgine (LaMICtal) 100 MG tablet Take 1 tablet by mouth Every 12 (Twelve) Hours for 30 days. 9/19/23 10/19/23  Norberto Xiong MD   thiamine (VITAMIN B1) 100 MG tablet Take 1 tablet by mouth Daily for 30 days. 9/24/23 10/24/23  Norberto Xiong MD        Social History:   Social History     Tobacco Use    Smoking status: Never     Passive exposure: Never    Smokeless tobacco: Current     Types: Snuff   Vaping Use    Vaping Use: Never used   Substance Use Topics    Alcohol use: Yes     Alcohol/week: 8.0 standard drinks     Types: 8 Cans of beer per  "week     Comment: occ    Drug use: Never         Review of Systems:  Review of Systems   Constitutional:  Negative for chills and fever.   HENT:  Negative for congestion, ear pain and sore throat.    Eyes:  Negative for pain.   Respiratory:  Negative for cough, chest tightness and shortness of breath.    Cardiovascular:  Negative for chest pain.   Gastrointestinal:  Negative for abdominal pain, diarrhea, nausea and vomiting.   Genitourinary:  Negative for flank pain and hematuria.   Musculoskeletal:  Negative for joint swelling.   Skin:  Negative for pallor.   Neurological:  Positive for seizures. Negative for headaches.   All other systems reviewed and are negative.     Physical Exam:  /69   Pulse 100   Temp 98 °F (36.7 °C) (Oral)   Resp 18   Ht 172.7 cm (68\")   Wt 127 kg (279 lb 5.2 oz)   SpO2 97%   BMI 42.47 kg/m²         Physical Exam  Constitutional:       Appearance: Normal appearance.   HENT:      Head: Normocephalic and atraumatic.      Nose: Nose normal.      Mouth/Throat:      Mouth: Mucous membranes are moist.   Eyes:      Extraocular Movements: Extraocular movements intact.      Conjunctiva/sclera: Conjunctivae normal.      Pupils: Pupils are equal, round, and reactive to light.   Cardiovascular:      Rate and Rhythm: Normal rate and regular rhythm.      Pulses: Normal pulses.      Heart sounds: Normal heart sounds.   Pulmonary:      Effort: Pulmonary effort is normal.      Breath sounds: Normal breath sounds.   Abdominal:      General: There is no distension.      Palpations: Abdomen is soft.      Tenderness: There is no abdominal tenderness.   Musculoskeletal:         General: Normal range of motion.      Cervical back: Normal range of motion.   Skin:     General: Skin is warm and dry.      Capillary Refill: Capillary refill takes less than 2 seconds.   Neurological:      General: No focal deficit present.      Mental Status: He is alert and oriented to person, place, and time. Mental " status is at baseline.   Psychiatric:         Mood and Affect: Mood normal.         Behavior: Behavior normal.                    Procedures:  Procedures      Medical Decision Making:      Comorbidities that affect care:    Seizure, Coronary Artery Disease, Hypertension    External Notes reviewed:    Previous Admission Note: Patient was admitted to the hospital on 9/18/2023 for seizure disorder, alcohol use, hypertension and obesity.      The following orders were placed and all results were independently analyzed by me:  Orders Placed This Encounter   Procedures    [place] Draw    Comprehensive Metabolic Panel    CBC Auto Differential    Urinalysis With Microscopic If Indicated (No Culture) - Urine, Clean Catch    Ethanol    Urine Drug Screen - Urine, Clean Catch    Lamotrigine Level    CBC & Differential    Green Top (Gel)    Lavender Top    Gold Top - SST    Light Blue Top       Medications Given in the Emergency Department:  Medications   lamoTRIgine (LaMICtal) tablet 100 mg (100 mg Oral Given 9/26/23 0325)        ED Course:    The patient was initially evaluated in the triage area where orders were placed. The patient was later dispositioned by Pauline Agudelo MD.      The patient was advised to stay for completion of workup which includes but is not limited to communication of labs and radiological results, reassessment and plan. The patient was advised that leaving prior to disposition by a provider could result in critical findings that are not communicated to the patient.          Labs:    Lab Results (last 24 hours)       Procedure Component Value Units Date/Time    CBC & Differential [[id number]]  (Abnormal) Collected: 09/25/23 2141    Specimen: Blood Updated: 09/25/23 2156    Narrative:      The following orders were created for panel order CBC & Differential.  Procedure                               Abnormality         Status                     ---------                               -----------          ------                     CBC Auto Differential[[id number]]        Abnormal            Final result                 Please view results for these tests on the individual orders.    Comprehensive Metabolic Panel [[id number]]  (Abnormal) Collected: 09/25/23 2141    Specimen: Blood Updated: 09/25/23 2229     Glucose 104 mg/dL      BUN 5 mg/dL      Creatinine 1.00 mg/dL      Sodium 142 mmol/L      Potassium 3.6 mmol/L      Comment: Slight hemolysis detected by analyzer. Results may be affected.        Chloride 105 mmol/L      CO2 23.8 mmol/L      Calcium 9.9 mg/dL      Total Protein 7.6 g/dL      Albumin 4.4 g/dL      ALT (SGPT) 107 U/L      AST (SGOT) 97 U/L      Comment: Slight hemolysis detected by analyzer. Results may be affected.        Alkaline Phosphatase 80 U/L      Total Bilirubin 0.8 mg/dL      Globulin 3.2 gm/dL      A/G Ratio 1.4 g/dL      BUN/Creatinine Ratio 5.0     Anion Gap 13.2 mmol/L      eGFR 97.6 mL/min/1.73     Narrative:      GFR Normal >60  Chronic Kidney Disease <60  Kidney Failure <15      CBC Auto Differential [[id number]]  (Abnormal) Collected: 09/25/23 2141    Specimen: Blood Updated: 09/25/23 2156     WBC 7.28 10*3/mm3      RBC 5.47 10*6/mm3      Hemoglobin 17.7 g/dL      Hematocrit 49.2 %      MCV 89.9 fL      MCH 32.4 pg      MCHC 36.0 g/dL      RDW 14.0 %      RDW-SD 46.5 fl      MPV 9.3 fL      Platelets 144 10*3/mm3      Neutrophil % 53.2 %      Lymphocyte % 29.4 %      Monocyte % 10.6 %      Eosinophil % 4.7 %      Basophil % 1.8 %      Immature Grans % 0.3 %      Neutrophils, Absolute 3.88 10*3/mm3      Lymphocytes, Absolute 2.14 10*3/mm3      Monocytes, Absolute 0.77 10*3/mm3      Eosinophils, Absolute 0.34 10*3/mm3      Basophils, Absolute 0.13 10*3/mm3      Immature Grans, Absolute 0.02 10*3/mm3      nRBC 0.0 /100 WBC     Ethanol [[id number]]  (Abnormal) Collected: 09/25/23 2141    Specimen: Blood Updated: 09/25/23 2235     Ethanol 250 mg/dL      Ethanol % 0.250 %     Narrative:       Ethanol (Plasma)  <10 Essentially Negative    Toxic Concentrations           mg/dL    Flushing, slowing of reflexes    Impaired visual activity         Depression of CNS              >100  Possible Coma                  >300       Urinalysis With Microscopic If Indicated (No Culture) - Urine, Clean Catch [[id number]]  (Normal) Collected: 09/25/23 2147    Specimen: Urine, Clean Catch Updated: 09/25/23 2158     Color, UA Yellow     Appearance, UA Clear     pH, UA 6.0     Specific Gravity, UA <=1.005     Glucose, UA Negative     Ketones, UA Negative     Bilirubin, UA Negative     Blood, UA Negative     Protein, UA Negative     Leuk Esterase, UA Negative     Nitrite, UA Negative     Urobilinogen, UA 1.0 E.U./dL    Narrative:      Urine microscopic not indicated.    Urine Drug Screen - Urine, Clean Catch [[id number]]  (Normal) Collected: 09/25/23 2147    Specimen: Urine, Clean Catch Updated: 09/25/23 2234     Amphet/Methamphet, Screen Negative     Barbiturates Screen, Urine Negative     Benzodiazepine Screen, Urine Negative     Cocaine Screen, Urine Negative     Opiate Screen Negative     THC, Screen, Urine Negative     Methadone Screen, Urine Negative     Oxycodone Screen, Urine Negative     Fentanyl, Urine Negative    Narrative:      Negative Thresholds Per Drugs Screened:    Amphetamines                 500 ng/ml  Barbiturates                 200 ng/ml  Benzodiazepines              100 ng/ml  Cocaine                      300 ng/ml  Methadone                    300 ng/ml  Opiates                      300 ng/ml  Oxycodone                    100 ng/ml  THC                           50 ng/ml  Fentanyl                       5 ng/ml      The Normal Value for all drugs tested is negative. This report includes final unconfirmed screening results to be used for medical treatment purposes only. Unconfirmed results must not be used for non-medical purposes such as employment or legal testing. Clinical consideration  should be applied to any drug of abuse test, particularly when unconfirmed results are used.            Lamotrigine Level [[id number]] Collected: 09/26/23 0322    Specimen: Blood Updated: 09/26/23 0325             Imaging:    No Radiology Exams Resulted Within Past 24 Hours      Differential Diagnosis and Discussion:      Seizure: Differential diagnosis includes but is not limited to meningitis, hypoglycemia, electrolyte abnormalities, intracranial hemorrhage, toxin induced, and pseudoseizure.    All labs were reviewed and interpreted by me.    MDM  Number of Diagnoses or Management Options  Seizure  Diagnosis management comments:   Patient is afebrile nontoxic-appearing.  Vital signs are stable.  At time of evaluation he is sitting in bed in no acute distress.  Alcohol level is elevated to 250.  Patient does admit to drinking earlier.  Patient is currently back to baseline.  He has no focal neurological deficits.  Patient states he has been taking his medication as prescribed but has not had his evening dose.  Patient given a dose of Lamictal in the emergency department.  He already has a follow-up appointment coming up with his neurologist.  Discussed seizure precautions.  Recommend that he stop drinking.  Recommend follow-up with his PCP as well.  Discussed return precautions, discharge instructions and answered all his questions.       Amount and/or Complexity of Data Reviewed  Clinical lab tests: reviewed  Tests in the radiology section of CPT®: reviewed  Review and summarize past medical records: yes  Independent visualization of images, tracings, or specimens: yes    Risk of Complications, Morbidity, and/or Mortality  Presenting problems: moderate  Management options: moderate             Patient Care Considerations:    CONSULT: I considered consulting neurology, however patient stable for out patient follow up as scheduled      Consultants/Shared Management Plan:    None    Social Determinants of  Health:    Patient is independent, reliable, and has access to care.       Disposition and Care Coordination:    Discharged: The patient is suitable and stable for discharge with no need for consideration of observation or admission.    I have explained the patient´s condition, diagnoses and treatment plan based on the information available to me at this time. I have answered questions and addressed any concerns. The patient has a good  understanding of the patient´s diagnosis, condition, and treatment plan as can be expected at this point. The vital signs have been stable. The patient´s condition is stable and appropriate for discharge from the emergency department.      The patient will pursue further outpatient evaluation with the primary care physician or other designated or consulting physician as outlined in the discharge instructions. They are agreeable to this plan of care and follow-up instructions have been explained in detail. The patient has received these instructions in written format and have expressed an understanding of the discharge instructions. The patient is aware that any significant change in condition or worsening of symptoms should prompt an immediate return to this or the closest emergency department or call to 911.  I have explained discharge medications and the need for follow up with the patient/caretakers. This was also printed in the discharge instructions. Patient was discharged with the following medications and follow up:      Medication List      No changes were made to your prescriptions during this visit.      Dalila Moon, APRN  [id number] [facility]  [place] [address]  [phone]    In 2 days         Final diagnoses:   Seizure        ED Disposition       ED Disposition   Discharge    Condition   Stable    Comment   --               This medical record created using voice recognition software.             Pauline Agudelo MD  09/26/23 0519

## 2023-09-29 ENCOUNTER — APPOINTMENT (OUTPATIENT)
Dept: GENERAL RADIOLOGY | Facility: HOSPITAL | Age: 38
End: 2023-09-29
Payer: COMMERCIAL

## 2023-09-29 ENCOUNTER — HOSPITAL ENCOUNTER (EMERGENCY)
Facility: HOSPITAL | Age: 38
Discharge: HOME | End: 2023-09-29
Attending: EMERGENCY MEDICINE
Payer: COMMERCIAL

## 2023-09-29 VITALS
OXYGEN SATURATION: 89 % | HEART RATE: 115 BPM | HEIGHT: 68 IN | SYSTOLIC BLOOD PRESSURE: 133 MMHG | WEIGHT: 283.07 LBS | RESPIRATION RATE: 16 BRPM | DIASTOLIC BLOOD PRESSURE: 85 MMHG | BODY MASS INDEX: 42.9 KG/M2 | TEMPERATURE: 98.4 F

## 2023-09-29 DIAGNOSIS — F10.920 ALCOHOLIC INTOXICATION WITHOUT COMPLICATION: ICD-10-CM

## 2023-09-29 DIAGNOSIS — K21.9 GASTROESOPHAGEAL REFLUX DISEASE WITHOUT ESOPHAGITIS: Primary | ICD-10-CM

## 2023-09-29 LAB
ALBUMIN SERPL-MCNC: 4.4 G/DL (ref 3.5–5.2)
ALBUMIN/GLOB SERPL: 1.4 G/DL
ALP SERPL-CCNC: 74 U/L (ref 39–117)
ALT SERPL W P-5'-P-CCNC: 65 U/L (ref 1–41)
ANION GAP SERPL CALCULATED.3IONS-SCNC: 14.1 MMOL/L (ref 5–15)
AST SERPL-CCNC: 46 U/L (ref 1–40)
BASOPHILS # BLD AUTO: 0.17 10*3/MM3 (ref 0–0.2)
BASOPHILS NFR BLD AUTO: 2.3 % (ref 0–1.5)
BILIRUB SERPL-MCNC: 0.7 MG/DL (ref 0–1.2)
BUN SERPL-MCNC: 4 MG/DL (ref 6–20)
BUN/CREAT SERPL: 4.2 (ref 7–25)
CALCIUM SPEC-SCNC: 9.7 MG/DL (ref 8.6–10.5)
CHLORIDE SERPL-SCNC: 105 MMOL/L (ref 98–107)
CO2 SERPL-SCNC: 23.9 MMOL/L (ref 22–29)
CREAT SERPL-MCNC: 0.96 MG/DL (ref 0.76–1.27)
DEPRECATED RDW RBC AUTO: 45.9 FL (ref 37–54)
EGFRCR SERPLBLD CKD-EPI 2021: 102.5 ML/MIN/1.73
EOSINOPHIL # BLD AUTO: 0.44 10*3/MM3 (ref 0–0.4)
EOSINOPHIL NFR BLD AUTO: 6 % (ref 0.3–6.2)
ERYTHROCYTE [DISTWIDTH] IN BLOOD BY AUTOMATED COUNT: 14 % (ref 12.3–15.4)
ETHANOL BLD-MCNC: 287 MG/DL (ref 0–10)
ETHANOL UR QL: 0.29 %
GLOBULIN UR ELPH-MCNC: 3.1 GM/DL
GLUCOSE SERPL-MCNC: 120 MG/DL (ref 65–99)
HCT VFR BLD AUTO: 48.4 % (ref 37.5–51)
HGB BLD-MCNC: 16.9 G/DL (ref 13–17.7)
HOLD SPECIMEN: NORMAL
HOLD SPECIMEN: NORMAL
IMM GRANULOCYTES # BLD AUTO: 0.03 10*3/MM3 (ref 0–0.05)
IMM GRANULOCYTES NFR BLD AUTO: 0.4 % (ref 0–0.5)
LAMOTRIGINE SERPL-MCNC: 2.7 UG/ML (ref 2–20)
LIPASE SERPL-CCNC: 56 U/L (ref 13–60)
LYMPHOCYTES # BLD AUTO: 2.44 10*3/MM3 (ref 0.7–3.1)
LYMPHOCYTES NFR BLD AUTO: 33.1 % (ref 19.6–45.3)
MAGNESIUM SERPL-MCNC: 2.3 MG/DL (ref 1.6–2.6)
MCH RBC QN AUTO: 31.5 PG (ref 26.6–33)
MCHC RBC AUTO-ENTMCNC: 34.9 G/DL (ref 31.5–35.7)
MCV RBC AUTO: 90.1 FL (ref 79–97)
MONOCYTES # BLD AUTO: 0.56 10*3/MM3 (ref 0.1–0.9)
MONOCYTES NFR BLD AUTO: 7.6 % (ref 5–12)
NEUTROPHILS NFR BLD AUTO: 3.74 10*3/MM3 (ref 1.7–7)
NEUTROPHILS NFR BLD AUTO: 50.6 % (ref 42.7–76)
NRBC BLD AUTO-RTO: 0 /100 WBC (ref 0–0.2)
NT-PROBNP SERPL-MCNC: <36 PG/ML (ref 0–450)
PLATELET # BLD AUTO: 192 10*3/MM3 (ref 140–450)
PMV BLD AUTO: 8.9 FL (ref 6–12)
POTASSIUM SERPL-SCNC: 3.8 MMOL/L (ref 3.5–5.2)
PROT SERPL-MCNC: 7.5 G/DL (ref 6–8.5)
RBC # BLD AUTO: 5.37 10*6/MM3 (ref 4.14–5.8)
SODIUM SERPL-SCNC: 143 MMOL/L (ref 136–145)
TROPONIN T SERPL HS-MCNC: <6 NG/L
WBC NRBC COR # BLD: 7.38 10*3/MM3 (ref 3.4–10.8)
WHOLE BLOOD HOLD COAG: NORMAL
WHOLE BLOOD HOLD SPECIMEN: NORMAL

## 2023-09-29 PROCEDURE — 99284 EMERGENCY DEPT VISIT MOD MDM: CPT

## 2023-09-29 PROCEDURE — 82077 ASSAY SPEC XCP UR&BREATH IA: CPT | Performed by: EMERGENCY MEDICINE

## 2023-09-29 PROCEDURE — 85025 COMPLETE CBC W/AUTO DIFF WBC: CPT

## 2023-09-29 PROCEDURE — 83690 ASSAY OF LIPASE: CPT

## 2023-09-29 PROCEDURE — 93005 ELECTROCARDIOGRAM TRACING: CPT

## 2023-09-29 PROCEDURE — 83880 ASSAY OF NATRIURETIC PEPTIDE: CPT

## 2023-09-29 PROCEDURE — 83735 ASSAY OF MAGNESIUM: CPT

## 2023-09-29 PROCEDURE — 93005 ELECTROCARDIOGRAM TRACING: CPT | Performed by: EMERGENCY MEDICINE

## 2023-09-29 PROCEDURE — 71045 X-RAY EXAM CHEST 1 VIEW: CPT

## 2023-09-29 PROCEDURE — 84484 ASSAY OF TROPONIN QUANT: CPT

## 2023-09-29 PROCEDURE — 80053 COMPREHEN METABOLIC PANEL: CPT

## 2023-09-29 RX ORDER — SODIUM CHLORIDE 0.9 % (FLUSH) 0.9 %
10 SYRINGE (ML) INJECTION AS NEEDED
Status: DISCONTINUED | OUTPATIENT
Start: 2023-09-29 | End: 2023-09-30 | Stop reason: HOSPADM

## 2023-09-29 RX ORDER — OMEPRAZOLE 40 MG/1
40 CAPSULE, DELAYED RELEASE ORAL DAILY
Qty: 14 CAPSULE | Refills: 0 | Status: SHIPPED | OUTPATIENT
Start: 2023-09-29

## 2023-09-29 RX ORDER — ASPIRIN 81 MG/1
324 TABLET, CHEWABLE ORAL ONCE
Status: DISCONTINUED | OUTPATIENT
Start: 2023-09-29 | End: 2023-09-29

## 2023-09-30 LAB
QT INTERVAL: 328 MS
QT INTERVAL: 341 MS
QTC INTERVAL: 431 MS
QTC INTERVAL: 443 MS

## 2023-09-30 NOTE — ED NOTES
"Mid sternal CP x \"all day\". Pt states that earlier the pn radiated down left arm. Pt rates 5/10. Pt self administered 324mg BASA and nitro SL prior to EMS arrival.   "

## 2023-09-30 NOTE — ED PROVIDER NOTES
Time: 10:48 PM EDT  Date of encounter:  9/29/2023  Independent Historian/Clinical History and Information was obtained by:   Patient    History is limited by: N/A    Chief Complaint: Chest pain      History of Present Illness:  Patient is a 38 y.o. year old male who presents to the emergency department for evaluation of epigastric pain.  This has been consistent all day.  Patient states he did belch to help alleviate the pressure.  Afebrile.  No vomiting.  No bloody stools.    HPI    Patient Care Team  Primary Care Provider: Dalila Moon APRN    Past Medical History:     Allergies   Allergen Reactions    Benadryl [Diphenhydramine] Hallucinations     Past Medical History:   Diagnosis Date    Hernia of abdominal wall     Hypertension     MI (myocardial infarction)     Seizures      Past Surgical History:   Procedure Laterality Date    APPENDECTOMY       History reviewed. No pertinent family history.    Home Medications:  Prior to Admission medications    Medication Sig Start Date End Date Taking? Authorizing Provider   folic acid (FOLVITE) 1 MG tablet Take 1 tablet by mouth Daily for 30 days. 9/20/23 10/20/23  Norberto Xiong MD   lamoTRIgine (LaMICtal) 100 MG tablet Take 1 tablet by mouth Every 12 (Twelve) Hours for 30 days. 9/19/23 10/19/23  Norberto Xiong MD   thiamine (VITAMIN B1) 100 MG tablet Take 1 tablet by mouth Daily for 30 days. 9/24/23 10/24/23  Norberto Xiong MD        Social History:   Social History     Tobacco Use    Smoking status: Never     Passive exposure: Never    Smokeless tobacco: Current     Types: Snuff   Vaping Use    Vaping Use: Never used   Substance Use Topics    Alcohol use: Yes     Alcohol/week: 8.0 standard drinks     Types: 8 Cans of beer per week     Comment: occ    Drug use: Never         Review of Systems:  Review of Systems   Constitutional:  Negative for chills and fever.   HENT:  Negative for congestion, rhinorrhea and sore throat.    Eyes:  Negative for photophobia.  "  Respiratory:  Negative for apnea, cough, chest tightness and shortness of breath.    Cardiovascular:  Positive for chest pain. Negative for palpitations.   Gastrointestinal:  Positive for abdominal pain and nausea. Negative for blood in stool, diarrhea and vomiting.   Endocrine: Negative.    Genitourinary:  Negative for difficulty urinating and dysuria.   Musculoskeletal:  Negative for back pain, joint swelling and myalgias.   Skin:  Negative for color change and wound.   Allergic/Immunologic: Negative.    Neurological:  Negative for seizures and headaches.   Psychiatric/Behavioral: Negative.     All other systems reviewed and are negative.     Physical Exam:  /85   Pulse 115   Temp 98.4 °F (36.9 °C) (Oral)   Resp 16   Ht 172.7 cm (68\")   Wt 128 kg (283 lb 1.1 oz)   SpO2 (!) 89%   BMI 43.04 kg/m²     Physical Exam  Vitals and nursing note reviewed.   Constitutional:       General: He is awake.      Appearance: Normal appearance. He is well-developed.   HENT:      Head: Normocephalic and atraumatic.      Nose: Nose normal.      Mouth/Throat:      Mouth: Mucous membranes are moist.   Eyes:      Extraocular Movements: Extraocular movements intact.      Pupils: Pupils are equal, round, and reactive to light.   Cardiovascular:      Rate and Rhythm: Normal rate and regular rhythm.      Heart sounds: Normal heart sounds.   Pulmonary:      Effort: Pulmonary effort is normal. No respiratory distress.      Breath sounds: Normal breath sounds. No wheezing, rhonchi or rales.   Abdominal:      General: Bowel sounds are normal.      Palpations: Abdomen is soft.      Tenderness: There is no abdominal tenderness. There is no guarding or rebound.      Comments: No rigidity   Musculoskeletal:         General: No tenderness. Normal range of motion.      Cervical back: Normal range of motion and neck supple.   Skin:     General: Skin is warm and dry.      Coloration: Skin is not jaundiced.      Comments: No scleral " icterus   Neurological:      General: No focal deficit present.      Mental Status: He is alert. Mental status is at baseline.   Psychiatric:         Mood and Affect: Mood normal.                Procedures:  Procedures      Medical Decision Making:      Comorbidities that affect care:    Seizures, hypertension, CAD/MI    External Notes reviewed:    Hospital Discharge Summary: Admission 9/18/2023 through 9/19/2023 at our facility.  Active and Resolved Hospital Problems:  Seizure disorder  Alcohol use  Hypertension  Obesity     Hospital Course      Hospital Course:  Kurt Balbuena is a 38 y.o. male brought to the emergency department for evaluation of recurrent seizure disorders.  Patient was seen in our emergency department 24 hours prior, similar issues.  Patient decided to leave AMA at that time.  Original concern was for breakthrough seizures.  However, patient states he has not taken his Keppra as prescribed.  Keppra was prescribed 500 mg twice daily, however patient was only taking 250 milligrams at night.  This is secondary to feelings of lightheaded and dizziness of the Keppra.  Original plan was for admission and monitoring, EEG and MRI.  However patient is unable to tolerate MRI due to claustrophobia, refusing Ativan or any other medications to help.  Case was discussed with patient's neurologist, patient was transition to Lamictal 100 mg twice daily in an attempt to bypass symptoms of dizziness.  Patient has a scheduled follow-up appointment with neurology in 10 days, neurologist comfortable with patient discharging, patient evaluated by hospitalist and felt safe to discharge home.  Patient did receive 500 mg of Keppra morning of discharge.  Patient seen on date of discharge, clinically and hemodynamically stable.  Patient provided concerning signs and symptoms prompting immediate medical attention, patient understanding a        The following orders were placed and all results were independently analyzed  by me:  Orders Placed This Encounter   Procedures    XR Chest 1 View    [facility] Draw    High Sensitivity Troponin T    Comprehensive Metabolic Panel    Lipase    BNP    Magnesium    CBC Auto Differential    Ethanol    NPO Diet NPO Type: Strict NPO    Undress & Gown    Continuous Pulse Oximetry    Oxygen Therapy- Nasal Cannula; Titrate 1-6 LPM Per SpO2; 90 - 95%    ECG 12 Lead ED Triage Standing Order; Chest Pain    ECG 12 Lead ED Triage Standing Order; Chest Pain    Insert Peripheral IV    CBC & Differential    Green Top (Gel)    Lavender Top    Gold Top - SST    Light Blue Top       Medications Given in the Emergency Department:  Medications   sodium chloride 0.9 % flush 10 mL (has no administration in time range)        ED Course:    ED Course as of 09/29/23 2345   Fri Sep 29, 2023   2251 I have personally interpreted the EKG today and it shows no evidence of any acute ischemia or heart arrhythmia. [RP]      ED Course User Index  [RP] Stephen Austin MD       Labs:    Lab Results (last 24 hours)       Procedure Component Value Units Date/Time    High Sensitivity Troponin T [[id number]]  (Normal) Collected: 09/29/23 2048    Specimen: Blood Updated: 09/29/23 2141     HS Troponin T <6 ng/L     Narrative:      High Sensitive Troponin T Reference Range:  <10.0 ng/L- Negative Female for AMI  <15.0 ng/L- Negative Male for AMI  >=10 - Abnormal Female indicating possible myocardial injury.  >=15 - Abnormal Male indicating possible myocardial injury.   Clinicians would have to utilize clinical acumen, EKG, Troponin, and serial changes to determine if it is an Acute Myocardial Infarction or myocardial injury due to an underlying chronic condition.         CBC & Differential [[id number]]  (Abnormal) Collected: 09/29/23 2048    Specimen: Blood Updated: 09/29/23 2102    Narrative:      The following orders were created for panel order CBC & Differential.  Procedure                               Abnormality         Status                      ---------                               -----------         ------                     CBC Auto Differential[[id number]]        Abnormal            Final result                 Please view results for these tests on the individual orders.    Comprehensive Metabolic Panel [[id number]]  (Abnormal) Collected: 09/29/23 2048    Specimen: Blood Updated: 09/29/23 2134     Glucose 120 mg/dL      BUN 4 mg/dL      Creatinine 0.96 mg/dL      Sodium 143 mmol/L      Potassium 3.8 mmol/L      Chloride 105 mmol/L      CO2 23.9 mmol/L      Calcium 9.7 mg/dL      Total Protein 7.5 g/dL      Albumin 4.4 g/dL      ALT (SGPT) 65 U/L      AST (SGOT) 46 U/L      Alkaline Phosphatase 74 U/L      Total Bilirubin 0.7 mg/dL      Globulin 3.1 gm/dL      A/G Ratio 1.4 g/dL      BUN/Creatinine Ratio 4.2     Anion Gap 14.1 mmol/L      eGFR 102.5 mL/min/1.73     Narrative:      GFR Normal >60  Chronic Kidney Disease <60  Kidney Failure <15      Lipase [[id number]]  (Normal) Collected: 09/29/23 2048    Specimen: Blood Updated: 09/29/23 2134     Lipase 56 U/L     BNP [[id number]]  (Normal) Collected: 09/29/23 2048    Specimen: Blood Updated: 09/29/23 2141     proBNP <36.0 pg/mL     Narrative:      This assay is used as an aid in the diagnosis of individuals suspected of having heart failure. It can be used as an aid in the diagnosis of acute decompensated heart failure (ADHF) in patients presenting with signs and symptoms of ADHF to the emergency department (ED). In addition, NT-proBNP of <300 pg/mL indicates ADHF is not likely.    Magnesium [[id number]]  (Normal) Collected: 09/29/23 2048    Specimen: Blood Updated: 09/29/23 2134     Magnesium 2.3 mg/dL     CBC Auto Differential [[id number]]  (Abnormal) Collected: 09/29/23 2048    Specimen: Blood Updated: 09/29/23 2102     WBC 7.38 10*3/mm3      RBC 5.37 10*6/mm3      Hemoglobin 16.9 g/dL      Hematocrit 48.4 %      MCV 90.1 fL      MCH 31.5 pg      MCHC 34.9 g/dL      RDW 14.0 %       RDW-SD 45.9 fl      MPV 8.9 fL      Platelets 192 10*3/mm3      Neutrophil % 50.6 %      Lymphocyte % 33.1 %      Monocyte % 7.6 %      Eosinophil % 6.0 %      Basophil % 2.3 %      Immature Grans % 0.4 %      Neutrophils, Absolute 3.74 10*3/mm3      Lymphocytes, Absolute 2.44 10*3/mm3      Monocytes, Absolute 0.56 10*3/mm3      Eosinophils, Absolute 0.44 10*3/mm3      Basophils, Absolute 0.17 10*3/mm3      Immature Grans, Absolute 0.03 10*3/mm3      nRBC 0.0 /100 WBC     Ethanol [[id number]]  (Abnormal) Collected: 09/29/23 2048    Specimen: Blood Updated: 09/29/23 2312     Ethanol 287 mg/dL      Ethanol % 0.287 %     Narrative:      Ethanol (Plasma)  <10 Essentially Negative    Toxic Concentrations           mg/dL    Flushing, slowing of reflexes    Impaired visual activity         Depression of CNS              >100  Possible Coma                  >300                Imaging:    XR Chest 1 View    Result Date: 9/29/2023  PROCEDURE: XR CHEST 1 VW  COMPARISON: 9/18/2023.  INDICATIONS: CHEST PAIN.  FINDINGS: A single frontal (AP or PA upright portable) chest radiograph reveals no cardiac enlargement and no acute infiltrate. No pneumothorax is seen.  Improvement in lung expansion is noted.  External artifacts obscure detail.  There are degenerative changes of the imaged spine.       No acute cardiopulmonary disease is seen radiographically.    Please note that portions of this note were completed with a voice recognition program.  ASHLEY HONEYCUTT JR, MD       Electronically Signed and Approved By: ASHLEY HONEYCUTT JR, MD on 9/29/2023 at 21:26                 Differential Diagnosis and Discussion:    Abdominal Pain: Based on the patient's signs and symptoms, I considered abdominal aortic aneurysm, small bowel obstruction, pancreatitis, acute cholecystitis, acute appendecitis, peptic ulcer disease, gastritis, colitis, endocrine disorders, irritable bowel syndrome and other differential diagnosis an etiology of  the patient's abdominal pain.  Chest Pain:  Based on the patient's signs and symptoms, I considered aortic dissection, myocardial infaction, pulmonary embolism, cardiac tamponade, pericarditis, pneumothorax, musculoskeletal chest pain and other differential diagnosis as an etiology of the patient's chest pain.     All labs were reviewed and interpreted by me.  All X-rays impressions were independently interpreted by me.  EKG was interpreted by me.    MDM     Amount and/or Complexity of Data Reviewed  Decide to obtain previous medical records or to obtain history from someone other than the patient: yes             Patient Care Considerations:    CONSULT: I considered consulting cardiology, however EKG is nonischemic and troponin is negative      Consultants/Shared Management Plan:    None    Social Determinants of Health:    Patient is independent, reliable, and has access to care.       Disposition and Care Coordination:    Discharged: The patient is suitable and stable for discharge with no need for consideration of observation or admission.    I have explained the patient´s condition, diagnoses and treatment plan based on the information available to me at this time. I have answered questions and addressed any concerns. The patient has a good  understanding of the patient´s diagnosis, condition, and treatment plan as can be expected at this point. The vital signs have been stable. The patient´s condition is stable and appropriate for discharge from the emergency department.      The patient will pursue further outpatient evaluation with the primary care physician or other designated or consulting physician as outlined in the discharge instructions. They are agreeable to this plan of care and follow-up instructions have been explained in detail. The patient has received these instructions in written format and have expressed an understanding of the discharge instructions. The patient is aware that any significant  change in condition or worsening of symptoms should prompt an immediate return to this or the closest emergency department or call to 911.    Final diagnoses:   Gastroesophageal reflux disease without esophagitis   Alcoholic intoxication without complication        ED Disposition       ED Disposition   Discharge    Condition   Stable    Comment   --               This medical record created using voice recognition software.             Stephen Austin MD  09/29/23 4580       Stephen Austin MD  09/29/23 3005

## 2023-10-02 ENCOUNTER — HOSPITAL ENCOUNTER (EMERGENCY)
Facility: HOSPITAL | Age: 38
Discharge: LEFT BEFORE DISCHARGE | End: 2023-10-02
Payer: COMMERCIAL

## 2023-10-02 PROCEDURE — 99211 OFF/OP EST MAY X REQ PHY/QHP: CPT

## 2023-10-06 ENCOUNTER — READMISSION MANAGEMENT (OUTPATIENT)
Dept: CALL CENTER | Facility: HOSPITAL | Age: 38
End: 2023-10-06
Payer: COMMERCIAL

## 2023-10-06 NOTE — OUTREACH NOTE
Medical Week 3 Survey      Flowsheet Row Responses   [facility] patient discharged from? [place]   Does the patient have one of the following disease processes/diagnoses(primary or secondary)? Other   Week 3 attempt successful? Yes   Call start time 1006   Call end time 1007   Discharge diagnosis Seizure   Meds reviewed with patient/caregiver? Yes   Is the patient taking all medications as directed (includes completed medication regime)? Yes   Comments regarding appointments Sleep Study 11/1/23   Does the patient have a primary care provider?  Yes   Has the patient kept scheduled appointments due by today? N/A   Has home health visited the patient within 72 hours of discharge? N/A   Psychosocial issues? No   Did the patient receive a copy of their discharge instructions? Yes   Nursing interventions Reviewed instructions with patient   What is the patient's perception of their health status since discharge? Improving   Is the patient/caregiver able to teach back signs and symptoms related to disease process for when to call PCP? Yes   Is the patient/caregiver able to teach back signs and symptoms related to disease process for when to call 911? Yes   Is the patient/caregiver able to teach back the hierarchy of who to call/visit for symptoms/problems? PCP, Specialist, Home health nurse, Urgent Care, ED, 911 Yes   If the patient is a current smoker, are they able to teach back resources for cessation? [id number]   Week 3 Call Completed? Yes   Graduated Yes   Graduated/Revoked comments Pt improving - pt has a sleep study apt scheduled for 11/1/23   Call end time 1007            Dian H - Registered Nurse

## 2023-10-13 ENCOUNTER — HOSPITAL ENCOUNTER (EMERGENCY)
Facility: HOSPITAL | Age: 38
Discharge: HOME | End: 2023-10-13
Attending: EMERGENCY MEDICINE
Payer: COMMERCIAL

## 2023-10-13 VITALS
SYSTOLIC BLOOD PRESSURE: 109 MMHG | OXYGEN SATURATION: 95 % | RESPIRATION RATE: 18 BRPM | DIASTOLIC BLOOD PRESSURE: 79 MMHG | TEMPERATURE: 98 F | BODY MASS INDEX: 41.43 KG/M2 | HEART RATE: 84 BPM | HEIGHT: 68 IN | WEIGHT: 273.37 LBS

## 2023-10-13 DIAGNOSIS — R56.9 SEIZURE: Primary | ICD-10-CM

## 2023-10-13 LAB
ALBUMIN SERPL-MCNC: 4.3 G/DL (ref 3.5–5.2)
ALBUMIN/GLOB SERPL: 1.3 G/DL
ALP SERPL-CCNC: 75 U/L (ref 39–117)
ALT SERPL W P-5'-P-CCNC: 89 U/L (ref 1–41)
ANION GAP SERPL CALCULATED.3IONS-SCNC: 11.6 MMOL/L (ref 5–15)
AST SERPL-CCNC: 81 U/L (ref 1–40)
BASOPHILS # BLD AUTO: 0.13 10*3/MM3 (ref 0–0.2)
BASOPHILS NFR BLD AUTO: 1.7 % (ref 0–1.5)
BILIRUB SERPL-MCNC: 1.2 MG/DL (ref 0–1.2)
BUN SERPL-MCNC: 6 MG/DL (ref 6–20)
BUN/CREAT SERPL: 6.6 (ref 7–25)
CALCIUM SPEC-SCNC: 9.6 MG/DL (ref 8.6–10.5)
CHLORIDE SERPL-SCNC: 103 MMOL/L (ref 98–107)
CO2 SERPL-SCNC: 26.4 MMOL/L (ref 22–29)
CREAT SERPL-MCNC: 0.91 MG/DL (ref 0.76–1.27)
DEPRECATED RDW RBC AUTO: 46 FL (ref 37–54)
EGFRCR SERPLBLD CKD-EPI 2021: 109.3 ML/MIN/1.73
EOSINOPHIL # BLD AUTO: 0.45 10*3/MM3 (ref 0–0.4)
EOSINOPHIL NFR BLD AUTO: 5.8 % (ref 0.3–6.2)
ERYTHROCYTE [DISTWIDTH] IN BLOOD BY AUTOMATED COUNT: 13.6 % (ref 12.3–15.4)
GLOBULIN UR ELPH-MCNC: 3.4 GM/DL
GLUCOSE SERPL-MCNC: 99 MG/DL (ref 65–99)
HCT VFR BLD AUTO: 49.9 % (ref 37.5–51)
HGB BLD-MCNC: 17.4 G/DL (ref 13–17.7)
HOLD SPECIMEN: NORMAL
HOLD SPECIMEN: NORMAL
IMM GRANULOCYTES # BLD AUTO: 0.03 10*3/MM3 (ref 0–0.05)
IMM GRANULOCYTES NFR BLD AUTO: 0.4 % (ref 0–0.5)
LYMPHOCYTES # BLD AUTO: 2.55 10*3/MM3 (ref 0.7–3.1)
LYMPHOCYTES NFR BLD AUTO: 32.7 % (ref 19.6–45.3)
MAGNESIUM SERPL-MCNC: 2.4 MG/DL (ref 1.6–2.6)
MCH RBC QN AUTO: 31.8 PG (ref 26.6–33)
MCHC RBC AUTO-ENTMCNC: 34.9 G/DL (ref 31.5–35.7)
MCV RBC AUTO: 91.1 FL (ref 79–97)
MONOCYTES # BLD AUTO: 0.68 10*3/MM3 (ref 0.1–0.9)
MONOCYTES NFR BLD AUTO: 8.7 % (ref 5–12)
NEUTROPHILS NFR BLD AUTO: 3.96 10*3/MM3 (ref 1.7–7)
NEUTROPHILS NFR BLD AUTO: 50.7 % (ref 42.7–76)
NRBC BLD AUTO-RTO: 0 /100 WBC (ref 0–0.2)
PLATELET # BLD AUTO: 153 10*3/MM3 (ref 140–450)
PMV BLD AUTO: 9.5 FL (ref 6–12)
POTASSIUM SERPL-SCNC: 4.1 MMOL/L (ref 3.5–5.2)
PROT SERPL-MCNC: 7.7 G/DL (ref 6–8.5)
QT INTERVAL: 349 MS
QTC INTERVAL: 438 MS
RBC # BLD AUTO: 5.48 10*6/MM3 (ref 4.14–5.8)
SODIUM SERPL-SCNC: 141 MMOL/L (ref 136–145)
TROPONIN T SERPL HS-MCNC: <6 NG/L
WBC NRBC COR # BLD: 7.8 10*3/MM3 (ref 3.4–10.8)
WHOLE BLOOD HOLD COAG: NORMAL
WHOLE BLOOD HOLD SPECIMEN: NORMAL

## 2023-10-13 PROCEDURE — 93005 ELECTROCARDIOGRAM TRACING: CPT | Performed by: EMERGENCY MEDICINE

## 2023-10-13 PROCEDURE — 80053 COMPREHEN METABOLIC PANEL: CPT | Performed by: EMERGENCY MEDICINE

## 2023-10-13 PROCEDURE — 25810000003 SODIUM CHLORIDE 0.9 % SOLUTION: Performed by: EMERGENCY MEDICINE

## 2023-10-13 PROCEDURE — 85025 COMPLETE CBC W/AUTO DIFF WBC: CPT | Performed by: EMERGENCY MEDICINE

## 2023-10-13 PROCEDURE — 96374 THER/PROPH/DIAG INJ IV PUSH: CPT

## 2023-10-13 PROCEDURE — 84484 ASSAY OF TROPONIN QUANT: CPT | Performed by: EMERGENCY MEDICINE

## 2023-10-13 PROCEDURE — 93010 ELECTROCARDIOGRAM REPORT: CPT | Performed by: INTERNAL MEDICINE

## 2023-10-13 PROCEDURE — 83735 ASSAY OF MAGNESIUM: CPT | Performed by: EMERGENCY MEDICINE

## 2023-10-13 PROCEDURE — 93005 ELECTROCARDIOGRAM TRACING: CPT

## 2023-10-13 PROCEDURE — 25010000002 LEVETIRACETAM IN NACL 0.82% 500 MG/100ML SOLUTION: Performed by: EMERGENCY MEDICINE

## 2023-10-13 PROCEDURE — 99284 EMERGENCY DEPT VISIT MOD MDM: CPT

## 2023-10-13 RX ORDER — SODIUM CHLORIDE 0.9 % (FLUSH) 0.9 %
10 SYRINGE (ML) INJECTION AS NEEDED
Status: DISCONTINUED | OUTPATIENT
Start: 2023-10-13 | End: 2023-10-13 | Stop reason: HOSPADM

## 2023-10-13 RX ORDER — LEVETIRACETAM 5 MG/ML
500 INJECTION INTRAVASCULAR ONCE
Status: COMPLETED | OUTPATIENT
Start: 2023-10-13 | End: 2023-10-13

## 2023-10-13 RX ADMIN — LEVETIRACETAM 500 MG: 5 INJECTION INTRAVENOUS at 01:25

## 2023-10-13 RX ADMIN — SODIUM CHLORIDE 1000 ML: 9 INJECTION, SOLUTION INTRAVENOUS at 01:24

## 2023-10-13 NOTE — ED PROVIDER NOTES
Time: 1:16 AM EDT  Date of encounter:  10/13/2023  Independent Historian/Clinical History and Information was obtained by:   Patient    History is limited by: N/A    Chief Complaint: Seizure-like activity      History of Present Illness:  Patient is a 38 y.o. year old male who presents to the emergency department for evaluation of a seizure that happened tonight.  Wife reports that the patient has had significant amount of diarrhea.  Patient has had no vomiting.  Patient's had no chest pain or shortness of breath.  Patient's had no cough or hemoptysis.  Patient denies fever and chills.  Per family members, he continues to drink alcohol.    HPI    Patient Care Team  Primary Care Provider: Dalila Moon APRN    Past Medical History:     Allergies   Allergen Reactions    Benadryl [Diphenhydramine] Hallucinations     Past Medical History:   Diagnosis Date    Hernia of abdominal wall     Hypertension     MI (myocardial infarction)     Seizures      Past Surgical History:   Procedure Laterality Date    APPENDECTOMY       History reviewed. No pertinent family history.    Home Medications:  Prior to Admission medications    Medication Sig Start Date End Date Taking? Authorizing Provider   folic acid (FOLVITE) 1 MG tablet Take 1 tablet by mouth Daily for 30 days. 9/20/23 10/20/23 Yes Norberto Xiong MD   lamoTRIgine (LaMICtal) 100 MG tablet Take 1 tablet by mouth Every 12 (Twelve) Hours for 30 days. 9/19/23 10/19/23 Yes Norberto Xiong MD   omeprazole (priLOSEC) 40 MG capsule Take 1 capsule by mouth Daily. 9/29/23  Yes Stephen Austin MD   thiamine (VITAMIN B1) 100 MG tablet Take 1 tablet by mouth Daily for 30 days. 9/24/23 10/24/23 Yes Norberto Xiong MD        Social History:   Social History     Tobacco Use    Smoking status: Never     Passive exposure: Never    Smokeless tobacco: Current     Types: Snuff   Vaping Use    Vaping Use: Never used   Substance Use Topics    Alcohol use: Yes     Alcohol/week: 8.0 standard  "drinks of alcohol     Types: 8 Cans of beer per week     Comment: occ    Drug use: Never         Review of Systems:  Review of Systems   Constitutional:  Negative for chills and fever.   HENT:  Negative for congestion, rhinorrhea and sore throat.    Eyes:  Negative for pain and visual disturbance.   Respiratory:  Negative for apnea, cough, chest tightness and shortness of breath.    Cardiovascular:  Negative for chest pain and palpitations.   Gastrointestinal:  Negative for abdominal pain, diarrhea, nausea and vomiting.   Genitourinary:  Negative for difficulty urinating and dysuria.   Musculoskeletal:  Negative for joint swelling and myalgias.   Skin:  Negative for color change.   Neurological:  Positive for seizures. Negative for headaches.   Psychiatric/Behavioral: Negative.     All other systems reviewed and are negative.       Physical Exam:  /79   Pulse 84   Temp 98 øF (36.7 øC) (Oral)   Resp 18   Ht 172.7 cm (67.99\")   Wt 124 kg (273 lb 5.9 oz)   SpO2 95%   BMI 41.58 kg/mý     Physical Exam  Vitals and nursing note reviewed.   Constitutional:       General: He is not in acute distress.     Appearance: Normal appearance. He is not toxic-appearing.   HENT:      Head: Normocephalic and atraumatic.      Jaw: There is normal jaw occlusion.   Eyes:      General: Lids are normal.      Extraocular Movements: Extraocular movements intact.      Conjunctiva/sclera: Conjunctivae normal.      Pupils: Pupils are equal, round, and reactive to light.   Cardiovascular:      Rate and Rhythm: Normal rate and regular rhythm.      Pulses: Normal pulses.      Heart sounds: Normal heart sounds.   Pulmonary:      Effort: Pulmonary effort is normal. No respiratory distress.      Breath sounds: Normal breath sounds. No wheezing or rhonchi.   Abdominal:      General: Abdomen is flat.      Palpations: Abdomen is soft.      Tenderness: There is no abdominal tenderness. There is no guarding or rebound.   Musculoskeletal:    "      General: Normal range of motion.      Cervical back: Normal range of motion and neck supple.      Right lower leg: No edema.      Left lower leg: No edema.   Skin:     General: Skin is warm and dry.   Neurological:      Mental Status: He is alert and oriented to person, place, and time. Mental status is at baseline.   Psychiatric:         Mood and Affect: Mood normal.                  Procedures:  Procedures      Medical Decision Making:      Comorbidities that affect care:    Seizure    External Notes reviewed:    Hospital Discharge Summary: Patient was recently admitted for seizure disorder and recurrent alcohol use      The following orders were placed and all results were independently analyzed by me:  Orders Placed This Encounter   Procedures    [place] Draw    Comprehensive Metabolic Panel    Magnesium    Single High Sensitivity Troponin T    CBC Auto Differential    NPO Diet NPO Type: Strict NPO    Undress & Gown    Continuous Pulse Oximetry    Vital Signs    Orthostatic Blood Pressure    Oxygen Therapy- Nasal Cannula; Titrate 1-6 LPM Per SpO2; 90 - 95%    POC Glucose Once    ECG 12 Lead ED Triage Standing Order; Syncope    Insert Peripheral IV    CBC & Differential    Green Top (Gel)    Lavender Top    Gold Top - SST    Light Blue Top       Medications Given in the Emergency Department:  Medications   sodium chloride 0.9 % flush 10 mL (has no administration in time range)   sodium chloride 0.9 % bolus 1,000 mL (0 mL Intravenous Stopped 10/13/23 0234)   levETIRAcetam in NaCl 0.82% (KEPPRA) IVPB 500 mg (0 mg Intravenous Stopped 10/13/23 0234)        ED Course:         Labs:    Lab Results (last 24 hours)       Procedure Component Value Units Date/Time    CBC & Differential [[id number]]  (Abnormal) Collected: 10/13/23 0035    Specimen: Blood Updated: 10/13/23 0042    Narrative:      The following orders were created for panel order CBC & Differential.  Procedure                               Abnormality          Status                     ---------                               -----------         ------                     CBC Auto Differential[[id number]]        Abnormal            Final result                 Please view results for these tests on the individual orders.    Comprehensive Metabolic Panel [[id number]]  (Abnormal) Collected: 10/13/23 0035    Specimen: Blood Updated: 10/13/23 0110     Glucose 99 mg/dL      BUN 6 mg/dL      Creatinine 0.91 mg/dL      Sodium 141 mmol/L      Potassium 4.1 mmol/L      Comment: Slight hemolysis detected by analyzer. Results may be affected.        Chloride 103 mmol/L      CO2 26.4 mmol/L      Calcium 9.6 mg/dL      Total Protein 7.7 g/dL      Albumin 4.3 g/dL      ALT (SGPT) 89 U/L      AST (SGOT) 81 U/L      Comment: Slight hemolysis detected by analyzer. Results may be affected.        Alkaline Phosphatase 75 U/L      Total Bilirubin 1.2 mg/dL      Globulin 3.4 gm/dL      A/G Ratio 1.3 g/dL      BUN/Creatinine Ratio 6.6     Anion Gap 11.6 mmol/L      eGFR 109.3 mL/min/1.73     Narrative:      GFR Normal >60  Chronic Kidney Disease <60  Kidney Failure <15      Magnesium [[id number]]  (Normal) Collected: 10/13/23 0035    Specimen: Blood Updated: 10/13/23 0110     Magnesium 2.4 mg/dL     Single High Sensitivity Troponin T [[id number]]  (Normal) Collected: 10/13/23 0035    Specimen: Blood Updated: 10/13/23 0109     HS Troponin T <6 ng/L     Narrative:      High Sensitive Troponin T Reference Range:  <10.0 ng/L- Negative Female for AMI  <15.0 ng/L- Negative Male for AMI  >=10 - Abnormal Female indicating possible myocardial injury.  >=15 - Abnormal Male indicating possible myocardial injury.   Clinicians would have to utilize clinical acumen, EKG, Troponin, and serial changes to determine if it is an Acute Myocardial Infarction or myocardial injury due to an underlying chronic condition.         CBC Auto Differential [[id number]]  (Abnormal) Collected: 10/13/23 0035    Specimen: Blood  Updated: 10/13/23 0042     WBC 7.80 10*3/mm3      RBC 5.48 10*6/mm3      Hemoglobin 17.4 g/dL      Hematocrit 49.9 %      MCV 91.1 fL      MCH 31.8 pg      MCHC 34.9 g/dL      RDW 13.6 %      RDW-SD 46.0 fl      MPV 9.5 fL      Platelets 153 10*3/mm3      Neutrophil % 50.7 %      Lymphocyte % 32.7 %      Monocyte % 8.7 %      Eosinophil % 5.8 %      Basophil % 1.7 %      Immature Grans % 0.4 %      Neutrophils, Absolute 3.96 10*3/mm3      Lymphocytes, Absolute 2.55 10*3/mm3      Monocytes, Absolute 0.68 10*3/mm3      Eosinophils, Absolute 0.45 10*3/mm3      Basophils, Absolute 0.13 10*3/mm3      Immature Grans, Absolute 0.03 10*3/mm3      nRBC 0.0 /100 WBC              Imaging:    No Radiology Exams Resulted Within Past 24 Hours      Differential Diagnosis and Discussion:    Seizure: Differential diagnosis includes but is not limited to meningitis, hypoglycemia, electrolyte abnormalities, intracranial hemorrhage, toxin induced, and pseudoseizure.    All labs were reviewed and interpreted by me.    MDM     Amount and/or Complexity of Data Reviewed  Clinical lab tests: reviewed  Tests in the medicine section of CPTr: reviewed       The patient's CBC that was reviewed and interpreted by me shows no abnormalities of critical concern. Of note, there is no anemia requiring a blood transfusion and the platelet count is acceptable.  The patient's CMP that was reviewed and interpretted by me shows no abnormalities of critical concern. Of note, the patient's sodium and potassium are acceptable. The patient's liver enzymes are unremarkable. The patient's renal function (creatinine) is preserved. The patient has a normal anion gap.  Magnesium is 2.4.  The patient is resting comfortably and feels better, is alert, talkative and in no distress. The repeat examination is unremarkable and benign. The patient is neurologically intact, has a normal mental status and XXis ambulatory in the EDXX. The history, exam, diagnostic testing  in the patient's current condition do not suggest meningitis, stroke, sepsis, subarachnoid hemorrhage, intracranial bleeding, encephalitis or other significant pathology that would warrant further testing, continued ED treatment, admission, neurological consultation, or other specialist evaluation at this point. The vital signs have been stable. The patient's condition is stable and appropriate for discharge. The patient will pursue further outpatient evaluation with the primary care physician or other designated or consulting position as indicated in the discharge instructions.        Patient Care Considerations:    CT HEAD: I considered ordering a noncontrast CT of the head, however patient did not hit his head.      Consultants/Shared Management Plan:    None    Social Determinants of Health:    Patient has presented with family members who are responsible, reliable and will ensure follow up care.      Disposition and Care Coordination:    Discharged: I considered escalation of care by admitting this patient for observation, however the patient has improved and is suitable and  stable for discharge.    I have explained the patient's condition, diagnoses and treatment plan based on the information available to me at this time. I have answered questions and addressed any concerns. The patient has a good  understanding of the patient's diagnosis, condition, and treatment plan as can be expected at this point. The vital signs have been stable. The patient's condition is stable and appropriate for discharge from the emergency department.      The patient will pursue further outpatient evaluation with the primary care physician or other designated or consulting physician as outlined in the discharge instructions. They are agreeable to this plan of care and follow-up instructions have been explained in detail. The patient has received these instructions in written format and have expressed an understanding of the  discharge instructions. The patient is aware that any significant change in condition or worsening of symptoms should prompt an immediate return to this or the closest emergency department or call to 911.  I have explained discharge medications and the need for follow up with the patient/caretakers. This was also printed in the discharge instructions. Patient was discharged with the following medications and follow up:      Medication List      No changes were made to your prescriptions during this visit.      Dalila Moon, APRN  [address] [facility] [address]  [facility] [address]  [phone]    In 2 days         Final diagnoses:   Seizure        ED Disposition       ED Disposition   Discharge    Condition   Stable    Comment   --               This medical record created using voice recognition software.             Sonia Whitney MD  10/13/23 1647

## 2023-10-14 ENCOUNTER — HOSPITAL ENCOUNTER (EMERGENCY)
Facility: HOSPITAL | Age: 38
Discharge: LEFT BEFORE DISCHARGE | End: 2023-10-14
Payer: COMMERCIAL

## 2023-10-14 PROCEDURE — 99211 OFF/OP EST MAY X REQ PHY/QHP: CPT

## 2024-11-04 ENCOUNTER — HOSPITAL ENCOUNTER (OUTPATIENT)
Dept: CT IMAGING | Facility: HOSPITAL | Age: 39
Discharge: HOME | End: 2024-11-04
Admitting: SURGERY
Payer: COMMERCIAL

## 2024-11-04 ENCOUNTER — OFFICE VISIT (OUTPATIENT)
Dept: SURGERY | Facility: CLINIC | Age: 39
End: 2024-11-04
Payer: COMMERCIAL

## 2024-11-04 VITALS
WEIGHT: 283.3 LBS | HEART RATE: 86 BPM | BODY MASS INDEX: 42.93 KG/M2 | HEIGHT: 68 IN | DIASTOLIC BLOOD PRESSURE: 91 MMHG | SYSTOLIC BLOOD PRESSURE: 133 MMHG

## 2024-11-04 DIAGNOSIS — K43.2 VENTRAL INCISIONAL HERNIA: ICD-10-CM

## 2024-11-04 DIAGNOSIS — Z72.0 TOBACCO USE: Primary | ICD-10-CM

## 2024-11-04 DIAGNOSIS — Z72.0 TOBACCO USE: ICD-10-CM

## 2024-11-04 PROCEDURE — 99204 OFFICE O/P NEW MOD 45 MIN: CPT | Performed by: SURGERY

## 2024-11-04 PROCEDURE — 1160F RVW MEDS BY RX/DR IN RCRD: CPT | Performed by: SURGERY

## 2024-11-04 PROCEDURE — 74176 CT ABD & PELVIS W/O CONTRAST: CPT

## 2024-11-04 PROCEDURE — 1159F MED LIST DOCD IN RCRD: CPT | Performed by: SURGERY

## 2024-11-04 NOTE — PROGRESS NOTES
"Chief Complaint:  NEW PATIENT (VENTRAL HERNIA)    Primary Care Provider: Leslye Pérez MD    Referring Provider: Referring, Self    History of Present Illness  Kurt Balbuena is a 39 y.o. male referred by Self Referring for a hernia.  The patient has several bulges at his abdominal wall.  He has a history of laparotomy many years ago for perforated appendicitis.  He says that is his only abdominal surgery.  The hernia has bothered the patient on and off for the past few years but over the past few months the hernia has become much more symptomatic.  Patient occasionally feels nauseated and sometimes feels like he is going to vomit but has not vomited yet.  Bowel function has not changed.  He does not smoke cigarettes but he dips.  Patient thought about having surgery a few years ago but decided not to as he believes too worried to undergo anesthesia.  The hernia is bothering the patient so much now that he is willing to have surgery.    Allergies: Benadryl [diphenhydramine]    No outpatient medications have been marked as taking for the 11/4/24 encounter (Office Visit) with Lance Zee MD.       Past Medical History:    Hernia of abdominal wall    Hypertension    MI (myocardial infarction)    Seizures        Past Surgical History:    APPENDECTOMY       Family History: History reviewed. No pertinent family history.     Social History:  Social History     Tobacco Use    Smoking status: Never     Passive exposure: Never    Smokeless tobacco: Current     Types: Snuff   Substance Use Topics    Alcohol use: Yes     Alcohol/week: 8.0 standard drinks of alcohol     Types: 8 Cans of beer per week     Comment: occ       Objective     Vital Signs:  /91 (BP Location: Left arm, Patient Position: Sitting, Cuff Size: Large Adult)   Pulse 86   Ht 172.7 cm (67.99\")   Wt 129 kg (283 lb 4.8 oz)   BMI 43.09 kg/m²   Respiratory:  breathing not labored, respiratory effort appears normal  Cardiovascular:  heart regular " rate  Abdomen:  soft,nondistended, several moderate sized bulges that are partially reducible but not completely reducible.  Bulges are soft.  Skin overlying bulges is not erythematous.  Musculoskeletal: moving all extremities symmetrically and purposefully  Neurologic:  no obvious motor or sensory deficits, speech clear  Wife and son present      Assessment:  Ventral incisional hernia    Plan:  CT abdomen pelvis to survey abdominal wall for number of hernia defects and sizes of the defects.  Depending on CT scan result, may need referred to the hernia specialist clinic at the [facility].  Will decide once I have CT scan result.  Will contact patient on telephone sometime after I have the result.    Discussion: Indications, options, risks, benefits, and expected outcomes of planned surgery were discussed with the patient and he agrees to proceed.    Lance Zee MD  11/04/2024    Electronically signed by Lance Zee MD, 11/04/24, 1:18 PM EST.    Addendum, 11/5/24  I reviewed the patient's CT scan images and CT radiology report.  There are several fascial defects but none of the defects are very large in size.  There is herniated colon and small bowel, and it appears there is a developing bowel obstruction.  My impression is that the patient does not need referred to a hernia specialist clinic and can have surgery here.  I called the patient on the telephone.  He says he is having quite a bit of abdominal pain at the location of the bulges.  He feels nauseated but has not vomited.  My opinion is that he should proceed with surgery as soon as possible given his personal schedule.  The timing of the surgery is deemed urgent.  Patient is available for surgery this week.  Surgery will be scheduled on 11/7/24.    Electronically signed by Lance Zee MD, 11/05/24, 9:30 AM EST.

## 2024-11-05 ENCOUNTER — TELEPHONE (OUTPATIENT)
Dept: SURGERY | Facility: CLINIC | Age: 39
End: 2024-11-05
Payer: COMMERCIAL

## 2024-11-05 ENCOUNTER — PREP FOR SURGERY (OUTPATIENT)
Dept: OTHER | Facility: HOSPITAL | Age: 39
End: 2024-11-05
Payer: COMMERCIAL

## 2024-11-05 DIAGNOSIS — K43.2 VENTRAL INCISIONAL HERNIA: Primary | ICD-10-CM

## 2024-11-05 NOTE — TELEPHONE ENCOUNTER
Called and spoke with pt wife. I told her that surgery has been brandyn for Thursday, 11-7 & went over all instructions. She V/U.

## 2024-11-05 NOTE — TELEPHONE ENCOUNTER
Caller: Traci Balbuena     Relationship: WIFE     Best call back number: [phone]     What is the best time to reach you: ANYTIME    Who are you requesting to speak with (clinical staff, provider,  specific staff member): DOESN'T KNOW     Do you know the name of the person who called: NO     What was the call regarding:  PATIENT THINK IT MAY BE REGARDING SCHEDULING SURGERY     Is it okay if the provider responds through MyChart:  PREFERS PHONE CALL

## 2024-11-05 NOTE — TELEPHONE ENCOUNTER
PATIENT'S WIFE, ADRY, CALLED.  THE HOSPITAL PRE-REGISTERED HER  FOR SURGERY ON 11/07/24.  THEY TOLD THEM TO CALL THE OFFICE REGARDING SURGICAL SOAP.    #[phone]

## 2024-11-05 NOTE — PRE-PROCEDURE INSTRUCTIONS
PATIENT & PATIENT'S WIFE INSTRUCTED FOR PATIENT TO BE:    - NOTHING TO EAT AFTER MIDNIGHT OR CHEW, EXCEPT CAN HAVE SIPS OF WATER WITH MEDICATIONS    - TO HOLD ALL VITAMINS, SUPPLEMENTS, NSAIDS FOR ONE WEEK PRIOR TO THEIR SURGICAL PROCEDURE    - DO NOT TAKE ___N/A___ 7 DAYS PRIOR TO PROCEDURE PER ANESTHESIA RECOMMENDATIONS/INSTRUCTIONS     - INSTRUCTED PT TO USE SURGICAL SOAP 1 TIME THE NIGHT PRIOR TO SURGERY __10-5-80_________ OR THE AM OF SURGERY _81-7-80____________   USE THE SOAP FROM NECK TO TOES, AVOID THEIR FACE, HAIR, AND PRIVATE PARTS. IF USE THE SOAP THE NIGHT PRIOR TO SURGERY, CHANGE BED LINENS AND NO PETS IN THE BED.     INSTRUCTED NO LOTIONS, JEWELRY, PIERCINGS,  NAIL POLISH, OR DEODORANT DAY OF SURGERY    - IF DIABETIC, CHECK BLOOD GLUCOSE IF LESS THAN 70 OR HAVING SYMPTOMS CALL THE PREOP AREA FOR INSTRUCTIONS ON AM OF SURGERY ( [phone])    -INSTRUCTED TO TAKE THE FOLLOWING MEDICATIONS THE DAY OF SURGERY WITH SIPS OF WATER:            NO MEDICATIONS        - DO NOT BRING ANY MEDICATIONS WITH YOU TO THE HOSPITAL THE DAY OF SURGERY, EXCEPT IF USE INHALERS. BRING INHALERS DAY OF SURGERY       - BRING CPAP OR BIPAP TO THE HOSPITAL ONLY IF YOU ARE SPENDING THE NIGHT    - DO NOT SMOKE OR VAPE 24 HOURS PRIOR TO PROCEDURE PER ANESTHESIA REQUEST     -MAKE SURE YOU HAVE A RIDE HOME OR SOMEONE TO STAY WITH YOU THE DAY OF THE PROCEDURE AFTER YOU GO HOME     - FOLLOW ANY OTHER INSTRUCTIONS GIVEN TO YOU BY YOUR SURGEON'S OFFICE.     - DAY OF SURGERY 12-5-24, COME TO [facility]/ [facility], [facility] FLOOR. CHECK IN AT THE DESK FOR REGISTRATION/SURGERY    - YOU WILL RECEIVE A PHONE CALL THE DAY PRIOR TO SURGERY BETWEEN 1PM AND 4 PM WITH ARRIVAL TIME, IF YOUR SURGERY IS ON A MONDAY YOU WILL RECEIVE A CALL THE FRIDAY PRIOR TO SURGERY DATE    - BRING CASH OR CREDIT CARD FOR COPAYMENT OF MEDICATIONS AFTER SURGERY IF YOU USE THE HOSPITAL PHARMACY (MEDS TO BED)    - PREADMISSION TESTING NURSE LU LAW -[phone] IF  HAVE ANY QUESTIONS     -PATIENT PROVIDED THE NUMBER FOR PREOP SURGICAL DEPT IF HAD QUESTIONS AFTER HOURS PRIOR TO SURGERY ( [phone]).  INFORMED PT IF NO ANSWER, LEAVE A MESSAGE AND SOMEONE WILL RETURN THEIR CALL       PATIENT & PATIENT'S WIFE VERBALIZED UNDERSTANDING

## 2024-11-06 ENCOUNTER — TELEPHONE (OUTPATIENT)
Dept: SURGERY | Facility: CLINIC | Age: 39
End: 2024-11-06
Payer: COMMERCIAL

## 2024-11-06 NOTE — TELEPHONE ENCOUNTER
LU BRANCH CALLED FROM PAT.  PER LU:  DR. TALLEY REV. PT CHART, REQ. CARDIAC CLEARANCE RE: NO F/U WITH CARDS FOR C/P ED VISITS,HX MI. DR. BUCKLEY/[facility] NOTIFIED AND PT UNAWARE, OFFICE TO NOTIFY PT.     CB#[phone]

## 2024-11-06 NOTE — TELEPHONE ENCOUNTER
I spoke with pt wife. She is aware surgery has to be canceled tomorrow due to not having cardiac clearance. I have done that with Adrianne in surgery scheduling. I have faxed the cardiac clearance letter to [phone]. She is aware I can get her  r/s after I have the cardiac clearance back.     Per Dr Zee, he says for pt to stay on a liquid diet for now and if he isn't able to eat food and throws up over the next day or two then he needs to go to the emergency room. Pt wife V/U.

## 2024-11-06 NOTE — TELEPHONE ENCOUNTER
Spoke with pt wife. She did confirm that he had a heart attack many years ago and had seen Dr Issa Mcneill in the past. I told her that anesthesia is requesting cardiac clearance before they feel comfortable putting pt to sleep. She understands and is going to call Dr Issa Pérez's office to get a plan and will call me back to let me know.

## 2024-11-06 NOTE — SIGNIFICANT NOTE
DR. TALLEY REV. PT CHART, REQ. CARDIAC CLEARANCE RE: NO F/U WITH CARDS FOR C/P ED VISITS,HX MI. DR. BUCKLEY/[facility] NOTIFIED AND PT UNAWARE, OFFICE TO NOTIFY PT.

## 2024-11-06 NOTE — TELEPHONE ENCOUNTER
PATIENT'S WIFE, ADRY, CALLED.  HER  IS SCHEDULED WITH DR. STEVEN BARGER ON MONDAY AT 3:00 PM.  THEY WERE GOING TO TRY TO GET HIM IN TODAY, BUT COULDN'T, BECAUSE IT IS PACEMAKER DAY.    DR. BARGER NEEDS PAPERWORK TELLING WHAT KIND OF SURGERY DR. BUCKLEY IS GOING TO DO FAXED [phone].    WIFE SAID HER  WANTS TO KNOW WHAT HE IS GOING TO DO.  HE CAN NOT EAT, BECAUSE IT CRAMPS HIS STOMACH SO BAD, AND HE CAN'T WORK.    #[phone]

## 2024-11-11 ENCOUNTER — TELEPHONE (OUTPATIENT)
Dept: SURGERY | Facility: CLINIC | Age: 39
End: 2024-11-11
Payer: COMMERCIAL

## 2024-11-11 NOTE — TELEPHONE ENCOUNTER
Caller: ADRY BERNARDO    Relationship: Emergency Contact    Best call back number: [phone]    What is the best time to reach you: ANYTIME    Who are you requesting to speak with (clinical staff, provider,  specific staff member): [facility]    PATIENTS WIFE WOULD LIKE TO SPEAK WITH PETER, DR. MENDEZ JAYT. PLEASE CALL.

## 2024-11-12 NOTE — TELEPHONE ENCOUNTER
I called and spoke with pt. Dr Zee next available date is Dec 5. Patient decided to go ahead and schedule surgery for 12/5/24. I spoke with Adrianne in surgery scheduling.

## 2024-11-12 NOTE — TELEPHONE ENCOUNTER
Caller: ADRY BERNARDO     Relationship: Emergency Contact     Best call back number: [phone]     What is the best time to reach you: ANYTIME     Who are you requesting to speak with (clinical staff, provider,  specific staff member): [facility]     PATIENTS WIFE WOULD LIKE TO SPEAK WITH PETER, DR. MENDEZ JAYT. PLEASE CALL.

## 2024-11-15 NOTE — NURSING NOTE
CALLED PT UPDATED PHI/ MEDS AFTER CARDIAC CLEARANCE OBTAINED FOR SURGERY. NO NEW FINDINGS. REVIEWED PREOP INSTRUCTIONS FOR SURGERY 12-5-24 AND CLEARANCE IN EPIC FLAGGED. PT VOICED UNDERSTANDING WITH INSTRUCTIONS GIVEN.

## 2024-11-18 ENCOUNTER — ANESTHESIA EVENT (OUTPATIENT)
Dept: PERIOP | Facility: HOSPITAL | Age: 39
End: 2024-11-18
Payer: COMMERCIAL

## 2024-12-05 ENCOUNTER — HOSPITAL ENCOUNTER (OUTPATIENT)
Facility: HOSPITAL | Age: 39
Discharge: HOME | End: 2024-12-05
Attending: SURGERY | Admitting: SURGERY
Payer: COMMERCIAL

## 2024-12-05 ENCOUNTER — ANESTHESIA (OUTPATIENT)
Dept: PERIOP | Facility: HOSPITAL | Age: 39
End: 2024-12-05
Payer: COMMERCIAL

## 2024-12-05 VITALS
BODY MASS INDEX: 39.67 KG/M2 | HEIGHT: 70 IN | TEMPERATURE: 98.8 F | HEART RATE: 78 BPM | SYSTOLIC BLOOD PRESSURE: 125 MMHG | RESPIRATION RATE: 16 BRPM | DIASTOLIC BLOOD PRESSURE: 83 MMHG | WEIGHT: 277.12 LBS | OXYGEN SATURATION: 91 %

## 2024-12-05 DIAGNOSIS — K43.2 VENTRAL INCISIONAL HERNIA: ICD-10-CM

## 2024-12-05 PROCEDURE — 49593 RPR AA HRN 1ST 3-10 RDC: CPT | Performed by: SURGERY

## 2024-12-05 PROCEDURE — 25010000002 ONDANSETRON PER 1 MG: Performed by: NURSE ANESTHETIST, CERTIFIED REGISTERED

## 2024-12-05 PROCEDURE — 25010000002 FENTANYL CITRATE (PF) 50 MCG/ML SOLUTION: Performed by: NURSE ANESTHETIST, CERTIFIED REGISTERED

## 2024-12-05 PROCEDURE — 25010000002 CEFAZOLIN 3 G RECONSTITUTED SOLUTION 1 EACH VIAL: Performed by: SURGERY

## 2024-12-05 PROCEDURE — 25010000002 GLYCOPYRROLATE 0.2 MG/ML SOLUTION: Performed by: NURSE ANESTHETIST, CERTIFIED REGISTERED

## 2024-12-05 PROCEDURE — 25010000002 BUPIVACAINE (PF) 0.25 % SOLUTION: Performed by: SURGERY

## 2024-12-05 PROCEDURE — S2900 ROBOTIC SURGICAL SYSTEM: HCPCS | Performed by: SURGERY

## 2024-12-05 PROCEDURE — 25010000002 MIDAZOLAM PER 1MG: Performed by: ANESTHESIOLOGY

## 2024-12-05 PROCEDURE — C1781 MESH (IMPLANTABLE): HCPCS | Performed by: SURGERY

## 2024-12-05 PROCEDURE — 25010000002 LIDOCAINE PF 2% 2 % SOLUTION: Performed by: NURSE ANESTHETIST, CERTIFIED REGISTERED

## 2024-12-05 PROCEDURE — 25010000002 HYDROMORPHONE PER 4 MG: Performed by: NURSE ANESTHETIST, CERTIFIED REGISTERED

## 2024-12-05 PROCEDURE — 25010000002 SUGAMMADEX 200 MG/2ML SOLUTION: Performed by: ANESTHESIOLOGY

## 2024-12-05 PROCEDURE — 25810000003 LACTATED RINGERS PER 1000 ML: Performed by: ANESTHESIOLOGY

## 2024-12-05 PROCEDURE — 25010000002 PROPOFOL 200 MG/20ML EMULSION: Performed by: NURSE ANESTHETIST, CERTIFIED REGISTERED

## 2024-12-05 PROCEDURE — 25010000002 DEXAMETHASONE PER 1 MG: Performed by: NURSE ANESTHETIST, CERTIFIED REGISTERED

## 2024-12-05 DEVICE — PHASIX ST MESH WITH ECHO 2 POSITIONING SYSTEM, 15 CM X 20 CM (6" X 8"), ELLIPSE
Type: IMPLANTABLE DEVICE | Site: ABDOMEN | Status: FUNCTIONAL
Brand: PHASIX

## 2024-12-05 DEVICE — ABSORBABLE WOUND CLOSURE DEVICE
Type: IMPLANTABLE DEVICE | Site: ABDOMEN | Status: FUNCTIONAL
Brand: SYNETURE

## 2024-12-05 DEVICE — SUT CONTRL TISS STRATAFIX SPIRAL PDS PLS SH1 3/0 20CM: Type: IMPLANTABLE DEVICE | Site: ABDOMEN | Status: FUNCTIONAL

## 2024-12-05 DEVICE — PBT NON ABSORBABLE WOUND CLOSURE DEVICE
Type: IMPLANTABLE DEVICE | Site: ABDOMEN | Status: FUNCTIONAL
Brand: V-LOC

## 2024-12-05 RX ORDER — MEPERIDINE HYDROCHLORIDE 25 MG/ML
12.5 INJECTION INTRAMUSCULAR; INTRAVENOUS; SUBCUTANEOUS
Status: DISCONTINUED | OUTPATIENT
Start: 2024-12-05 | End: 2024-12-05 | Stop reason: HOSPADM

## 2024-12-05 RX ORDER — DEXMEDETOMIDINE HYDROCHLORIDE 100 UG/ML
INJECTION, SOLUTION INTRAVENOUS AS NEEDED
Status: DISCONTINUED | OUTPATIENT
Start: 2024-12-05 | End: 2024-12-05 | Stop reason: SURG

## 2024-12-05 RX ORDER — ONDANSETRON 2 MG/ML
4 INJECTION INTRAMUSCULAR; INTRAVENOUS ONCE AS NEEDED
Status: DISCONTINUED | OUTPATIENT
Start: 2024-12-05 | End: 2024-12-05 | Stop reason: HOSPADM

## 2024-12-05 RX ORDER — GLYCOPYRROLATE 0.2 MG/ML
INJECTION INTRAMUSCULAR; INTRAVENOUS AS NEEDED
Status: DISCONTINUED | OUTPATIENT
Start: 2024-12-05 | End: 2024-12-05 | Stop reason: SURG

## 2024-12-05 RX ORDER — OXYCODONE HYDROCHLORIDE 5 MG/1
5 TABLET ORAL
Status: DISCONTINUED | OUTPATIENT
Start: 2024-12-05 | End: 2024-12-05 | Stop reason: HOSPADM

## 2024-12-05 RX ORDER — ONDANSETRON 2 MG/ML
4 INJECTION INTRAMUSCULAR; INTRAVENOUS EVERY 6 HOURS PRN
Status: DISCONTINUED | OUTPATIENT
Start: 2024-12-05 | End: 2024-12-06 | Stop reason: HOSPADM

## 2024-12-05 RX ORDER — ACETAMINOPHEN 500 MG
500 TABLET ORAL EVERY 4 HOURS PRN
Status: DISCONTINUED | OUTPATIENT
Start: 2024-12-05 | End: 2024-12-06 | Stop reason: HOSPADM

## 2024-12-05 RX ORDER — BUPIVACAINE HYDROCHLORIDE 2.5 MG/ML
INJECTION, SOLUTION EPIDURAL; INFILTRATION; INTRACAUDAL AS NEEDED
Status: DISCONTINUED | OUTPATIENT
Start: 2024-12-05 | End: 2024-12-05 | Stop reason: HOSPADM

## 2024-12-05 RX ORDER — HYDROMORPHONE HYDROCHLORIDE 1 MG/ML
0.25 INJECTION, SOLUTION INTRAMUSCULAR; INTRAVENOUS; SUBCUTANEOUS
Status: DISCONTINUED | OUTPATIENT
Start: 2024-12-05 | End: 2024-12-05 | Stop reason: HOSPADM

## 2024-12-05 RX ORDER — PROCHLORPERAZINE EDISYLATE 5 MG/ML
5 INJECTION INTRAMUSCULAR; INTRAVENOUS EVERY 6 HOURS PRN
Status: DISCONTINUED | OUTPATIENT
Start: 2024-12-05 | End: 2024-12-06 | Stop reason: HOSPADM

## 2024-12-05 RX ORDER — DIPHENHYDRAMINE HCL 25 MG
25 CAPSULE ORAL EVERY 8 HOURS PRN
Status: DISCONTINUED | OUTPATIENT
Start: 2024-12-05 | End: 2024-12-06 | Stop reason: HOSPADM

## 2024-12-05 RX ORDER — MIDAZOLAM HYDROCHLORIDE 2 MG/2ML
2 INJECTION, SOLUTION INTRAMUSCULAR; INTRAVENOUS ONCE
Status: COMPLETED | OUTPATIENT
Start: 2024-12-05 | End: 2024-12-05

## 2024-12-05 RX ORDER — PROPOFOL 10 MG/ML
INJECTION, EMULSION INTRAVENOUS AS NEEDED
Status: DISCONTINUED | OUTPATIENT
Start: 2024-12-05 | End: 2024-12-05 | Stop reason: SURG

## 2024-12-05 RX ORDER — KETOROLAC TROMETHAMINE 30 MG/ML
15 INJECTION, SOLUTION INTRAMUSCULAR; INTRAVENOUS EVERY 6 HOURS PRN
Status: DISCONTINUED | OUTPATIENT
Start: 2024-12-05 | End: 2024-12-06 | Stop reason: HOSPADM

## 2024-12-05 RX ORDER — PANTOPRAZOLE SODIUM 40 MG/1
40 TABLET, DELAYED RELEASE ORAL 2 TIMES DAILY PRN
Status: DISCONTINUED | OUTPATIENT
Start: 2024-12-05 | End: 2024-12-06 | Stop reason: HOSPADM

## 2024-12-05 RX ORDER — ACETAMINOPHEN 500 MG
1000 TABLET ORAL ONCE
Status: COMPLETED | OUTPATIENT
Start: 2024-12-05 | End: 2024-12-05

## 2024-12-05 RX ORDER — OXYCODONE AND ACETAMINOPHEN 5; 325 MG/1; MG/1
1-2 TABLET ORAL EVERY 4 HOURS PRN
Qty: 15 TABLET | Refills: 0 | Status: SHIPPED | OUTPATIENT
Start: 2024-12-05

## 2024-12-05 RX ORDER — HYDROMORPHONE HYDROCHLORIDE 1 MG/ML
0.5 INJECTION, SOLUTION INTRAMUSCULAR; INTRAVENOUS; SUBCUTANEOUS
Status: DISCONTINUED | OUTPATIENT
Start: 2024-12-05 | End: 2024-12-05 | Stop reason: HOSPADM

## 2024-12-05 RX ORDER — SODIUM CHLORIDE, SODIUM LACTATE, POTASSIUM CHLORIDE, CALCIUM CHLORIDE 600; 310; 30; 20 MG/100ML; MG/100ML; MG/100ML; MG/100ML
9 INJECTION, SOLUTION INTRAVENOUS CONTINUOUS PRN
Status: DISCONTINUED | OUTPATIENT
Start: 2024-12-05 | End: 2024-12-05 | Stop reason: HOSPADM

## 2024-12-05 RX ORDER — ROCURONIUM BROMIDE 10 MG/ML
INJECTION, SOLUTION INTRAVENOUS AS NEEDED
Status: DISCONTINUED | OUTPATIENT
Start: 2024-12-05 | End: 2024-12-05 | Stop reason: SURG

## 2024-12-05 RX ORDER — LIDOCAINE HYDROCHLORIDE 20 MG/ML
INJECTION, SOLUTION EPIDURAL; INFILTRATION; INTRACAUDAL; PERINEURAL AS NEEDED
Status: DISCONTINUED | OUTPATIENT
Start: 2024-12-05 | End: 2024-12-05 | Stop reason: SURG

## 2024-12-05 RX ORDER — FENTANYL CITRATE 50 UG/ML
INJECTION, SOLUTION INTRAMUSCULAR; INTRAVENOUS AS NEEDED
Status: DISCONTINUED | OUTPATIENT
Start: 2024-12-05 | End: 2024-12-05 | Stop reason: SURG

## 2024-12-05 RX ORDER — PROMETHAZINE HYDROCHLORIDE 12.5 MG/1
25 TABLET ORAL ONCE AS NEEDED
Status: DISCONTINUED | OUTPATIENT
Start: 2024-12-05 | End: 2024-12-05 | Stop reason: HOSPADM

## 2024-12-05 RX ORDER — OXYCODONE AND ACETAMINOPHEN 5; 325 MG/1; MG/1
1 TABLET ORAL EVERY 4 HOURS PRN
Status: DISCONTINUED | OUTPATIENT
Start: 2024-12-05 | End: 2024-12-06 | Stop reason: HOSPADM

## 2024-12-05 RX ORDER — DEXAMETHASONE SODIUM PHOSPHATE 4 MG/ML
INJECTION, SOLUTION INTRA-ARTICULAR; INTRALESIONAL; INTRAMUSCULAR; INTRAVENOUS; SOFT TISSUE AS NEEDED
Status: DISCONTINUED | OUTPATIENT
Start: 2024-12-05 | End: 2024-12-05 | Stop reason: SURG

## 2024-12-05 RX ORDER — PROMETHAZINE HYDROCHLORIDE 25 MG/1
25 SUPPOSITORY RECTAL ONCE AS NEEDED
Status: DISCONTINUED | OUTPATIENT
Start: 2024-12-05 | End: 2024-12-05 | Stop reason: HOSPADM

## 2024-12-05 RX ADMIN — ROCURONIUM BROMIDE 50 MG: 50 INJECTION INTRAVENOUS at 15:27

## 2024-12-05 RX ADMIN — DEXMEDETOMIDINE HYDROCHLORIDE 20 MCG: 100 INJECTION, SOLUTION, CONCENTRATE INTRAVENOUS at 16:17

## 2024-12-05 RX ADMIN — DEXMEDETOMIDINE HYDROCHLORIDE 20 MCG: 100 INJECTION, SOLUTION, CONCENTRATE INTRAVENOUS at 16:38

## 2024-12-05 RX ADMIN — FENTANYL CITRATE 50 MCG: 50 INJECTION, SOLUTION INTRAMUSCULAR; INTRAVENOUS at 19:42

## 2024-12-05 RX ADMIN — OXYCODONE 5 MG: 5 TABLET ORAL at 20:21

## 2024-12-05 RX ADMIN — DEXAMETHASONE SODIUM PHOSPHATE 4 MG: 4 INJECTION, SOLUTION INTRAMUSCULAR; INTRAVENOUS at 17:25

## 2024-12-05 RX ADMIN — DEXMEDETOMIDINE HYDROCHLORIDE 20 MCG: 100 INJECTION, SOLUTION, CONCENTRATE INTRAVENOUS at 16:10

## 2024-12-05 RX ADMIN — DEXMEDETOMIDINE HYDROCHLORIDE 20 MCG: 100 INJECTION, SOLUTION, CONCENTRATE INTRAVENOUS at 16:05

## 2024-12-05 RX ADMIN — DEXMEDETOMIDINE HYDROCHLORIDE 20 MCG: 100 INJECTION, SOLUTION, CONCENTRATE INTRAVENOUS at 16:07

## 2024-12-05 RX ADMIN — MIDAZOLAM HYDROCHLORIDE 2 MG: 1 INJECTION, SOLUTION INTRAMUSCULAR; INTRAVENOUS at 14:59

## 2024-12-05 RX ADMIN — SODIUM CHLORIDE 3000 MG: 9 INJECTION, SOLUTION INTRAVENOUS at 15:21

## 2024-12-05 RX ADMIN — HYDROMORPHONE HYDROCHLORIDE 0.5 MG: 1 INJECTION, SOLUTION INTRAMUSCULAR; INTRAVENOUS; SUBCUTANEOUS at 20:22

## 2024-12-05 RX ADMIN — ACETAMINOPHEN 1000 MG: 500 TABLET ORAL at 14:35

## 2024-12-05 RX ADMIN — ROCURONIUM BROMIDE 50 MG: 50 INJECTION INTRAVENOUS at 16:00

## 2024-12-05 RX ADMIN — ROCURONIUM BROMIDE 25 MG: 50 INJECTION INTRAVENOUS at 17:46

## 2024-12-05 RX ADMIN — FENTANYL CITRATE 50 MCG: 50 INJECTION, SOLUTION INTRAMUSCULAR; INTRAVENOUS at 18:02

## 2024-12-05 RX ADMIN — ONDANSETRON 4 MG: 2 INJECTION INTRAMUSCULAR; INTRAVENOUS at 19:42

## 2024-12-05 RX ADMIN — FENTANYL CITRATE 50 MCG: 50 INJECTION, SOLUTION INTRAMUSCULAR; INTRAVENOUS at 16:09

## 2024-12-05 RX ADMIN — FENTANYL CITRATE 50 MCG: 50 INJECTION, SOLUTION INTRAMUSCULAR; INTRAVENOUS at 19:26

## 2024-12-05 RX ADMIN — ROCURONIUM BROMIDE 25 MG: 50 INJECTION INTRAVENOUS at 17:24

## 2024-12-05 RX ADMIN — GLYCOPYRROLATE 0.2 MG: 0.2 INJECTION INTRAMUSCULAR; INTRAVENOUS at 15:58

## 2024-12-05 RX ADMIN — SODIUM CHLORIDE, POTASSIUM CHLORIDE, SODIUM LACTATE AND CALCIUM CHLORIDE: 600; 310; 30; 20 INJECTION, SOLUTION INTRAVENOUS at 15:09

## 2024-12-05 RX ADMIN — FENTANYL CITRATE 100 MCG: 50 INJECTION, SOLUTION INTRAMUSCULAR; INTRAVENOUS at 15:27

## 2024-12-05 RX ADMIN — SODIUM CHLORIDE, POTASSIUM CHLORIDE, SODIUM LACTATE AND CALCIUM CHLORIDE: 600; 310; 30; 20 INJECTION, SOLUTION INTRAVENOUS at 17:46

## 2024-12-05 RX ADMIN — PROPOFOL 200 MG: 10 INJECTION, EMULSION INTRAVENOUS at 15:27

## 2024-12-05 RX ADMIN — SUGAMMADEX 200 MG: 100 INJECTION, SOLUTION INTRAVENOUS at 19:47

## 2024-12-05 RX ADMIN — LIDOCAINE HYDROCHLORIDE 25 MG: 20 INJECTION, SOLUTION EPIDURAL; INFILTRATION; INTRACAUDAL; PERINEURAL at 15:27

## 2024-12-05 NOTE — ANESTHESIA PREPROCEDURE EVALUATION
Anesthesia Evaluation     Patient summary reviewed and Nursing notes reviewed   no history of anesthetic complications:   NPO Solid Status: > 8 hours  NPO Liquid Status: > 2 hours           Airway   Mallampati: II  TM distance: >3 FB  Neck ROM: full  No difficulty expected  Dental - normal exam     Pulmonary - negative pulmonary ROS and normal exam    breath sounds clear to auscultation    ROS comment: Chewing tobacco, last used at 0400 this morning    Cardiovascular - normal exam  Exercise tolerance: good (4-7 METS)    Rhythm: regular  Rate: normal    (+) past MI (cardiac clearance 11/24)  >12 months      Neuro/Psych  (+) seizures (was associated with Lyme disease; no seizure in 2 years)  GI/Hepatic/Renal/Endo - negative ROS     Musculoskeletal (-) negative ROS    Abdominal   (+) obese   Substance History - negative use     OB/GYN negative ob/gyn ROS         Other - negative ROS       ROS/Med Hx Other: >4METS.HX MI,,SEIZURES (MULTIPLE ED VISITS-MOST RECENT 10/13/23),NONCOMPLIANT WITH MEDICATIONS, HX ELEVATED AST/ALT. CARDS CLEARANCE 11/11/24. KT KT                     Anesthesia Plan    ASA 3     general     (Patient understands anesthesia not responsible for dental damage.)  intravenous induction     Anesthetic plan, risks, benefits, and alternatives have been provided, discussed and informed consent has been obtained with: patient and spouse/significant other.    Use of blood products discussed with patient .    Plan discussed with CRNA.        CODE STATUS:

## 2024-12-05 NOTE — H&P
Chief Complaint:  NEW PATIENT (VENTRAL HERNIA)    Primary Care Provider: Leslye Pérez MD    Referring Provider: Referring, Self    Patient is here for ventral hernia surgery.  Following is a copy of the HPI from my office visit.  Since my initial office visit patient was evaluated the CT scan and a decision was made to proceed with hernia repair.    History of Present Illness  Kurt Balbuena is a 39 y.o. male referred by Self Referring for a hernia.  The patient has several bulges at his abdominal wall.  He has a history of laparotomy many years ago for perforated appendicitis.  He says that is his only abdominal surgery.  The hernia has bothered the patient on and off for the past few years but over the past few months the hernia has become much more symptomatic.  Patient occasionally feels nauseated and sometimes feels like he is going to vomit but has not vomited yet.  Bowel function has not changed.  He does not smoke cigarettes but he dips.  Patient thought about having surgery a few years ago but decided not to as he believes too worried to undergo anesthesia.  The hernia is bothering the patient so much now that he is willing to have surgery.    Allergies: Benadryl [diphenhydramine]    No outpatient medications have been marked as taking for the 11/4/24 encounter (Office Visit) with Lance Zee MD.       Past Medical History:    Hernia of abdominal wall    Hypertension    MI (myocardial infarction)    Seizures        Past Surgical History:    APPENDECTOMY       Family History: History reviewed. No pertinent family history.     Social History:  Social History     Tobacco Use    Smoking status: Never     Passive exposure: Never    Smokeless tobacco: Current     Types: Snuff   Substance Use Topics    Alcohol use: Yes     Alcohol/week: 8.0 standard drinks of alcohol     Types: 8 Cans of beer per week     Comment: occ       Objective     Vital Signs:  Available in the EMR  Respiratory:  breathing not  labored, respiratory effort appears normal  Cardiovascular:  heart regular rate  Musculoskeletal: moving all extremities symmetrically and purposefully  Neurologic:  no obvious motor or sensory deficits, speech clear      Assessment:  Ventral incisional hernia    Plan:  Robotic Ventral incisional hernia    Discussion: Indications, options, risks, benefits, and expected outcomes of planned surgery were discussed with the patient and he agrees to proceed.    Lance Zee MD  12/5/24    Electronically signed by Lance Zee MD, 12/05/24, 1:04 PM EST.

## 2024-12-06 NOTE — ANESTHESIA POSTPROCEDURE EVALUATION
Patient: Kurt Balbuena    Procedure Summary       Date: 12/05/24 Room / Location: [facility] OR 08 / [facility] MAIN OR    Anesthesia Start: 1520 Anesthesia Stop: 2004    Procedure: VENTRAL / INCISIONAL HERNIA REPAIR LAPAROSCOPIC WITH KuGouINCI ROBOT:  Repair of holes in the wall of your abdomen and placement of a piece of mesh to cover the holes.  This will be done using a small camera and small instruments that go in and out hollow tubes placed through your abdominal wall. (Abdomen) Diagnosis:       Ventral incisional hernia      (Ventral incisional hernia [K43.2])    Surgeons: Lance Zee MD Provider: Remberto Parsons CRNA    Anesthesia Type: general ASA Status: 3            Anesthesia Type: general    Vitals  Vitals Value Taken Time   /70 12/05/24 2052   Temp 36.6 °C (97.9 °F) 12/05/24 2050   Pulse 89 12/05/24 2055   Resp 15 12/05/24 2055   SpO2 95 % 12/05/24 2055           Post Anesthesia Care and Evaluation    Patient location during evaluation: bedside  Patient participation: complete - patient participated  Level of consciousness: awake  Pain score: 2  Pain management: adequate    Airway patency: patent  Anesthetic complications: No anesthetic complications  PONV Status: none  Cardiovascular status: acceptable and stable  Respiratory status: acceptable  Hydration status: acceptable

## 2024-12-06 NOTE — OP NOTE
Operative Report    Patient Name:  Kurt Balbuena  YOB: 1985    Date of Surgery:  12/5/2024     Pre-op Diagnosis:   Ventral incisional hernia [K43.2]       Post-op Diagnosis:   Post-Op Diagnosis Codes:     * Ventral incisional hernia [K43.2]    Procedure:  VENTRAL INCISIONAL HERNIA REPAIR LAPAROSCOPIC WITH DAVINCI ROBOT    Staff:  Surgeon(s):  Lance Zee MD    Assistant: Eliecer Kimble CSA    Anesthesia: General    Estimated Blood Loss: 10 mL    Complications:  None    Drains:  None    Packing:  None    Implants:    Implant Name Type Inv. Item Serial No.  Lot No. LRB No. Used Action   SUT CONTRL TISS STRATAFIX SPIRAL PDS PLS SH1 3/0 20CM - [id number] Implant SUT CONTRL TISS STRATAFIX SPIRAL PDS PLS SH1 3/0 20CM  [employer]  DIV OF J AND J [id number] N/A 4 Implanted   DEV CLS WND VLOC/PBT FAMILIA NONABS 1/2CIR SZ0 27MM 45CM MICHELE - [id number] Implant DEV CLS WND VLOC/PBT FAMILIA NONABS 1/2CIR SZ0 27MM 45CM MICHELE  COVIDIEN [id number] N/A 2 Implanted   DEV CLS WND VLOC/PBT FAMILIA NONABS 1/2CIR SZ0 37MM 23CM MICHELE - [id number] Implant DEV CLS WND VLOC/PBT FAMILAI NONABS 1/2CIR SZ0 37MM 23CM MICHELE  COVIDIEN [id number] N/A 1 Implanted   MESH MARTÍN PHASIX ST W/ECHO2 POSTN SYS ELIPS [id number] - [id number] Implant MESH MARTÍN PHASIX ST W/ECHO2 POSTN SYS ELIPS [id number]  DAVOL  (DIV OF CR [employer]) [id number] N/A 1 Implanted     Specimen:  None     Indications:  See my preop H&P note.     Findings: 3 abdominal wall fascial defects.  Each defect was about 2 cm in size.  From the inferior aspect of the inferior most hernia defect to the superior aspect of the superior most hernia defect was a distance of 10 cm.  The hernia defects were suture closed with nonabsorbable suture and a 20 cm long by 15 cm wide Phasix ST mesh was sutured to the abdominal wall centered at the hernia defects.     Description of Procedure: Patient was taken to the operating room and placed supine on the operative table.  Timeout was performed.   General anesthesia was administered.  The patient was prepped and draped in the usual fashion.  Using my standard technique with a Veress needle to establish pneumoperitoneum and my standard one 12 mm robotic cannula and two 8 mm robotic cannulas, pneumoperitoneum was established and three robotic cannulas were placed at the left lateral abdominal wall.  The robotic arms were docked.  The robotic instruments were inserted.  The abdominal wall was inspected.  There were 3 areas where there was tissue herniated through hernia defects.    One of the hernia defects contained a loop of the transverse colon along with the greater omentum.  The other 2 hernia defects contained omentum and preperitoneal fat.  Tissue herniated through each of the defects was tightly adhered to the hernia defect.  I had to enlarge each of the hernia defects in order to loosen the herniated tissue enough so that I could reduce it.  All of the herniated tissue was reduced from each of the hernia defects.  This was done very carefully ensuring that there was no injury to the colon as this was done.  A large amount of omentum and fat was reduced from the hernia defects along with the aforementioned loop of colon.  See above findings section for description of the hernia defects.  Insufflation pressure was decreased to 6 mmHg and a nonabsorbable suture was used to close the 3 fascial defects.  A 20 cm long by 15 cm wide Phasix ST mesh was then placed into the abdominal cavity.  The mesh was positioned against the abdominal wall with the 20 cm axis oriented vertically.  The mesh was then secured to the abdominal wall at its circumference with running absorbable sutures.  The ECHO scaffolding was released from the mesh and then removed from the abdominal cavity.  There was adequate hemostasis.  All of the suture needles were removed from the abdominal cavity.  Any nonviable fat reduced from the hernia defects was placed in an Endo Catch bag and  removed from the abdomen and discarded.  Sponge, needle, and instrument counts were verified as correct.  The 12 mm robotic cannula was removed and the fascial defect where that cannula was placed was closed with a 0 Vicryl suture using a suture passer.  The robotic arms were undocked and the robotic cannulas were removed.  The skin incisions were closed appropriately with buried absorbable suture followed by appropriate dressings.  Patient did well during the procedure and was transported to the recovery area in stable condition.     Assistant: Eliecer SANCHEZ CSA  was responsible for performing the following activities: closing, placing dressing,  assisting with docking robot and exchanging robotic instruments and adjusting robotic arms as needed during the procedure, and his skilled assistance was necessary for the success of this case.      Lance Zee MD     Date: 12/5/2024  Time: 19:54 EST    Electronically signed by Lance Zee MD, 12/05/24, 7:54 PM EST.

## 2024-12-06 NOTE — DISCHARGE INSTRUCTIONS
Dr. Zee's Instructions          DIET  Gradually increase your dietary intake.  Although you will likely feel very hungry after surgery, do not eat too much for the first 12 to 24 hours.  Begin with a bland diet, such as chicken noodle soup, crackers, gatorade or tea, and gradually work your way up to a normal diet.     ACTIVITY & RETURN TO WORK  When you get home from the hospital, it is important that you get up and move around your house.  For the next 6 weeks, you should avoid any strenuous physical activity and you should not lift anything heavier than 15 pounds.  Walking up stairs and walking short distances for exercise are acceptable activities.  After 6 weeks, you have no activity restrictions and may gradually increase your activities using common sense.  You are excused from work for 6 weeks but you may return to work anytime before then should you feel able to do so and you can abide by the lifting restriction.     WOUND CARE & SHOWERING/BATHING  Your incisions are covered with a plastic type material that will flake off on its own in the next few weeks.  If the plastic type material has not flaked off on its own by 2 weeks after your surgery then use tweezers to pull it off.  You have sutures in your incisions but they are placed below the level of the skin and they will slowly dissolve (they do not need to be removed).  The skin around your incisions will likely have some bruising.  This is normal.  You can shower beginning tomorrow but wait two weeks after your surgery before taking any tub baths.       PAIN CONTROL  You will receive a narcotic pain medicine prescription before you are discharged home.  Be sure to take the narcotic pain medication with some food so as not to upset your stomach.  Do not drive while you are taking the prescription pain medication.  Also, take 3 tablets of Motrin 200 mg (total of 600mg) every 8 hours for the next 3 days & then discontinue.  Advil and ibuprofen work  the same as Motrin so you can use the same dose of either one of them instead of Motrin.  Some patients find that using an ice pack (a package of frozen corn or peas works well) for the first two days after surgery helps reduce pain.  If you decide to use an ice pack, apply it for 20 minutes and then remove it for 20 minutes.  Do this 4 or 5 times each day for only the first two or three days after surgery.  You will likely have some shoulder pain (especially the right shoulder).  This is caused by the air used to inflate your abdomen during surgery and will go away on its own in 24 to 48 hours.     BOWEL MOVEMENTS  It is not unusual for narcotic pain medications to cause constipation.  Also, the medications and anesthesia you received for your surgery can have a constipating effect.  To help avoid constipation, drink at least four eight-ounce glasses of water each day and use over-the-counter laxatives/stool softeners (dulcolax, milk of magnesia, senokot, etc.).  I recommend drinking the aforementioned amount of water daily and taking 30 ml of milk of magnesia two times each day while you are using the prescribed narcotic pain medication.  If your bowel movements become too loose or too frequent, then simply stop following these recommendations unless you start to feel constipated.     FOLLOW-UP VISIT & QUESTIONS/CONCERNS  Call Dr. Zee's office at [phone] and schedule a follow-up appointment for about 4 weeks after your surgery date.  Should you have any questions or concerns, have a temperature over 101 degrees, worsening abdominal pain, persistent nausea or vomiting, or any other problems you think need medical attention, please call Dr. Zee's office or go to the emergency room.                     Cooperative

## 2024-12-10 ENCOUNTER — TELEPHONE (OUTPATIENT)
Dept: SURGERY | Facility: CLINIC | Age: 39
End: 2024-12-10
Payer: COMMERCIAL

## 2024-12-10 NOTE — TELEPHONE ENCOUNTER
PT HAD HERNIA REPAIR ON 12/5. HE HAS NOT BEEN ABLE TO HAVE A BM. HE HAS BEEN TAKING MIRALAX WITH NO RESULTS. WHAT DO YOU RECOMMEND.

## 2024-12-23 ENCOUNTER — TELEPHONE (OUTPATIENT)
Dept: SURGERY | Facility: CLINIC | Age: 39
End: 2024-12-23
Payer: COMMERCIAL

## 2024-12-23 NOTE — TELEPHONE ENCOUNTER
Patient wife declined appt today. She said that pt was in [place] and that pt is taking it easy. She wanted an appt after christmas, so I gave her an appt for Fri 12-27 at 2pm. I did tell her in the mean time if he needed immediate care over the holidays he should go to the ER. She V/U.

## 2024-12-23 NOTE — TELEPHONE ENCOUNTER
VENTRAL INCISIONAL HERNIA REPAIR LAPAROSCOPIC WITH DAVINCI ROBOT 12/05/24.    PATIENT'S WIFE, ADRY, CALLED.  PATIENT HAS PINCHING AND BURNING WHERE BIG HERNIA WAS LOCATED, IF HE MOVES A CERTAIN WAY.    HIS P/O APPOINTMENT IS 01/06/25.  HE WOULD LIKE TO BE SEEN SOONER.    #[phone]

## 2024-12-23 NOTE — TELEPHONE ENCOUNTER
PATIENT'S WIFE CALLED.  HER  CAN'T COME AT 2:15 TODAY.  HE IS WITH SOMEONE, AND WON'T BE AT HOME UNTIL 3:00 OR 4:00 TODAY.    SHE SAID THE PINCHING AND BURNING IS JUST WHEN HER TURNS A CERTAIN WAY.    SHE ASKED IF HE CAN BE SEEN AFTER LILO.    #[phone]

## 2024-12-23 NOTE — TELEPHONE ENCOUNTER
No answer. LMOM. They can come in today, if they want. 2:15pm.    Can you try to call them for me please?

## 2024-12-27 ENCOUNTER — OFFICE VISIT (OUTPATIENT)
Dept: SURGERY | Facility: CLINIC | Age: 39
End: 2024-12-27
Payer: COMMERCIAL

## 2024-12-27 VITALS
RESPIRATION RATE: 16 BRPM | HEIGHT: 70 IN | WEIGHT: 273 LBS | DIASTOLIC BLOOD PRESSURE: 72 MMHG | SYSTOLIC BLOOD PRESSURE: 110 MMHG | BODY MASS INDEX: 39.08 KG/M2

## 2024-12-27 DIAGNOSIS — Z09 ENCOUNTER FOR FOLLOW-UP: Primary | ICD-10-CM

## 2024-12-27 PROCEDURE — 99024 POSTOP FOLLOW-UP VISIT: CPT | Performed by: SURGERY

## 2024-12-27 NOTE — PROGRESS NOTES
Patient is here for follow-up after I performed robotic ventral incisional hernia repair on 12/5/2024.  Follows a copy of the findings for my operative note:  3 abdominal wall fascial defects.  Each defect was about 2 cm in size.  From the inferior aspect of the inferior most hernia defect to the superior aspect of the superior most hernia defect was a distance of 10 cm.  The hernia defects were suture closed with nonabsorbable suture and a 20 cm long by 15 cm wide Phasix ST mesh was sutured to the abdominal wall centered at the hernia defects.     He has been having some burning and itching along the incisions and he now has a decent sized bulge at his lower abdomen where one of the hernias was located.  He is eating food normally and having normal bowel function.    Exam characteristics are consistent with a seroma or retained fatty tissue that was not reduced during the surgery.  Using standard technique I attempted to aspirate fluid but was only able to aspirate a small amount of thin dark blood.    I suspect the patient has retained fatty tissue in the subcutaneous tissue above the fascia.  No change in management at this time.  This was explained to the patient.  I will have him see me again in one month and likely evaluate with CT scan.

## 2025-01-27 ENCOUNTER — TELEPHONE (OUTPATIENT)
Dept: SURGERY | Facility: CLINIC | Age: 40
End: 2025-01-27

## 2025-01-27 NOTE — TELEPHONE ENCOUNTER
Caller: ADRY BERNARDO     Relationship:      Best call back number: [phone]     PATIENT CALLED REQUESTING TO CANCEL SAME DAY APPT.    Did the patient call AFTER the start time of their scheduled appointment?  []YES  [x]NO    Was the patient's appointment rescheduled? [x]YES  []NO

## 2025-02-04 ENCOUNTER — TELEPHONE (OUTPATIENT)
Dept: SURGERY | Facility: CLINIC | Age: 40
End: 2025-02-04
Payer: COMMERCIAL

## 2025-02-04 NOTE — TELEPHONE ENCOUNTER
PATIENT'S WIFE, ADRY, CALLED.  SHE SAID THE LAST TIME HER  WAS IN THE OFFICE, WHEN DR. BUCKLEY WAS DRAINING FLUID FROM HIS STOMACH, HE TOLD PATIENT NOT TO LIFT ANYTHING OR DO ANY STRENUOUS ACTIVITIES FOR 6 WEEK, DUE TO THE FLUID IN HIS STOMACH.    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    SHE WILL COME GET.    #[phone]

## 2025-03-14 ENCOUNTER — TELEPHONE (OUTPATIENT)
Dept: SURGERY | Facility: CLINIC | Age: 40
End: 2025-03-14
Payer: COMMERCIAL

## 2025-03-14 NOTE — TELEPHONE ENCOUNTER
PT NO SHOWED APPT 3/11 W/ [facility]    PT HAS BEEN CX/NO SHOWING 6X SINCE 12/27/2024.    DO YOU WANT ME TO CALL TO OFFER R/S AGAIN?

## 2025-03-17 NOTE — TELEPHONE ENCOUNTER
Dr. Zee said we would just let the pt call & r/s if he needs the appt. He has no-showed many times.

## (undated) DEVICE — ENDOPATH PNEUMONEEDLE INSUFFLATION NEEDLES WITH LUER LOCK CONNECTORS 120MM: Brand: ENDOPATH

## (undated) DEVICE — LAPAROVUE VISIBILITY SYSTEM LAPAROSCOPIC SOLUTIONS: Brand: LAPAROVUE

## (undated) DEVICE — SUT VIC PLS CTD BR 0 TIE 18IN VIL

## (undated) DEVICE — ARM DRAPE

## (undated) DEVICE — SYR LL TP 10ML STRL

## (undated) DEVICE — INTENDED FOR TISSUE SEPARATION, AND OTHER PROCEDURES THAT REQUIRE A SHARP SURGICAL BLADE TO PUNCTURE OR CUT.: Brand: BARD-PARKER ® CARBON RIB-BACK BLADES

## (undated) DEVICE — PHASIX MESH, 6" X 8" (15.2 CM X 20.3 CM), RECTANGLE
Type: IMPLANTABLE DEVICE | Site: ABDOMEN | Status: NON-FUNCTIONAL
Brand: PHASIX

## (undated) DEVICE — SLV SCD KN/LEN ADJ EXPRSS BLENDED MD 1P/U

## (undated) DEVICE — TIP COVER ACCESSORY

## (undated) DEVICE — SOL IRR NACL 0.9PCT BO 1000ML

## (undated) DEVICE — LAP-PORT CLOSURE DEVICE GUIDES 5MM AND 10/12 MM: Brand: LAP-PORT CLOSURE DEVICE GUIDES 5MM AND 10/12 MM

## (undated) DEVICE — TISSUE RETRIEVAL SYSTEM: Brand: INZII RETRIEVAL SYSTEM

## (undated) DEVICE — PENCL ES MEGADINE EZ/CLEAN BUTN W/HOLSTR 10FT

## (undated) DEVICE — SUT MNCRYL PLS ANTIB UD 4/0 PS2 18IN

## (undated) DEVICE — SEAL

## (undated) DEVICE — GLV SURG BIOGEL LTX PF 7 1/2

## (undated) DEVICE — 3 RING SUTURE PASSER - 16 CM: Brand: 3 RING SUTURE PASSER - 16 CM

## (undated) DEVICE — SHEET,DRAPE,70X85,STERILE: Brand: MEDLINE

## (undated) DEVICE — DAVINCI-LF: Brand: MEDLINE INDUSTRIES, INC.

## (undated) DEVICE — VESSEL SEALER EXTEND: Brand: ENDOWRIST